# Patient Record
Sex: MALE | Race: WHITE | NOT HISPANIC OR LATINO | Employment: FULL TIME | URBAN - METROPOLITAN AREA
[De-identification: names, ages, dates, MRNs, and addresses within clinical notes are randomized per-mention and may not be internally consistent; named-entity substitution may affect disease eponyms.]

---

## 2017-01-19 ENCOUNTER — OFFICE VISIT (OUTPATIENT)
Dept: FAMILY MEDICINE CLINIC | Facility: CLINIC | Age: 42
End: 2017-01-19

## 2017-01-19 VITALS
WEIGHT: 261.8 LBS | HEIGHT: 72 IN | DIASTOLIC BLOOD PRESSURE: 80 MMHG | RESPIRATION RATE: 16 BRPM | HEART RATE: 85 BPM | OXYGEN SATURATION: 98 % | TEMPERATURE: 98.5 F | SYSTOLIC BLOOD PRESSURE: 124 MMHG | BODY MASS INDEX: 35.46 KG/M2

## 2017-01-19 DIAGNOSIS — H69.81 EUSTACHIAN TUBE DYSFUNCTION, RIGHT: Primary | ICD-10-CM

## 2017-01-19 PROBLEM — H69.80 EUSTACHIAN TUBE DYSFUNCTION: Status: ACTIVE | Noted: 2017-01-19

## 2017-01-19 PROBLEM — H69.90 EUSTACHIAN TUBE DYSFUNCTION: Status: ACTIVE | Noted: 2017-01-19

## 2017-01-19 PROCEDURE — 99213 OFFICE O/P EST LOW 20 MIN: CPT | Performed by: INTERNAL MEDICINE

## 2017-01-19 RX ORDER — GUAIFENESIN, PSEUDOEPHEDRINE HYDROCHLORIDE 600; 60 MG/1; MG/1
1 TABLET, EXTENDED RELEASE ORAL EVERY 12 HOURS
Qty: 30 TABLET | Refills: 1 | Status: SHIPPED | OUTPATIENT
Start: 2017-01-19 | End: 2018-02-19 | Stop reason: SDUPTHER

## 2017-01-19 NOTE — MR AVS SNAPSHOT
Austin REYES You   1/19/2017 3:00 PM   Office Visit    Dept Phone:  [phone]   Encounter #:  [id number]    Provider:  Angel Nicole MD   Department:  [facility] PRIMARY CARE                Your Full Care Plan              Today's Medication Changes          These changes are accurate as of: 1/19/17  9:18 AM.  If you have any questions, ask your nurse or doctor.               New Medication(s)Ordered:     pseudoephedrine-guaifenesin  MG per 12 hr tablet   Commonly known as:  MUCINEX D   Take 1 tablet by mouth Every 12 (Twelve) Hours.   Started by:  Angel Nicole MD         Stop taking medication(s)listed here:     cyclobenzaprine 10 MG tablet   Commonly known as:  FLEXERIL   Stopped by:  Angel Nicole MD           sulfamethoxazole-trimethoprim 800-160 MG per tablet   Commonly known as:  BACTRIM DS   Stopped by:  Angel Nicole MD                Where to Get Your Medications      These medications were sent to [facility] [address] [place] [address] [address] [place] [address] [phone] [facility] [phone]   [address] [place] [address]     Phone:  [phone]     pseudoephedrine-guaifenesin  MG per 12 hr tablet                  Your Updated Medication List          This list is accurate as of: 1/19/17  9:18 AM.  Always use your most recent med list.                escitalopram 10 MG tablet   Commonly known as:  LEXAPRO   Take 1 tablet by mouth daily.       levocetirizine 5 MG tablet   Commonly known as:  XYZAL   Take 1 tablet by mouth  every evening       nystatin [id number] UNIT/GM ointment   Commonly known as:  MYCOSTATIN       omeprazole 20 MG capsule   Commonly known as:  priLOSEC   Take 1 capsule by mouth daily.       pseudoephedrine-guaifenesin  MG per 12 hr tablet   Commonly known as:  MUCINEX D   Take 1 tablet by mouth Every 12 (Twelve) Hours.       WELCHOL 625 MG tablet   Generic drug:  colesevelam       zolpidem 10 MG tablet   Commonly  "known as:  AMBIEN   Take 1 tablet by mouth At Night As Needed for sleep.               Instructions     None    Patient Instructions History      Upcoming Appointments     Visit Type Date Time Department    OFFICE VISIT 1/19/2017  3:00 PM [facility] What's in My Handbag Signup     Our records indicate that you have an active [facility] HEROZ account.    You can view your After Visit Summary by going to [facility] and logging in with your HEROZ username and password.  If you don't have a HEROZ username and password but a parent or guardian has access to your record, the parent or guardian should login with their own HEROZ username and password and access your record to view the After Visit Summary.    If you have questions, you can email [email or web address] or call [phone] to talk to our HEROZ staff.  Remember, HEROZ is NOT to be used for urgent needs.  For medical emergencies, dial 911.               Other Info from Your Visit           Your Appointments     Jan 19, 2017  3:00 PM EST   Office Visit with Angel Nicole MD   [facility] (--)    [address]  [facility] [id number] [phone]           Arrive 15 minutes prior to appointment.              Allergies     Ciprofloxacin Intolerance Myalgia      Reason for Visit     Earache since monday and when the day goes on this gets worse    Dizziness           Vital Signs     Blood Pressure Pulse Temperature Respirations Height Weight    124/80 85 98.5 °F (36.9 °C) (Oral) 16 72\" (182.9 cm) 261 lb 12.8 oz (119 kg)    Oxygen Saturation Body Mass Index Smoking Status             98% 35.51 kg/m2 Never Smoker           "

## 2017-01-19 NOTE — PROGRESS NOTES
"Subjective   Patient ID: Austin Orta is a 42 y.o. male presents with   Chief Complaint   Patient presents with   • Earache     since monday and when the day goes on this gets worse   • Dizziness       HPI - this patient presents today with right earache since Monday and some mild dizziness associated with that.  He's had some congestion but no fever.  His symptoms are intermittent and mild in intensity.he has had some mild intermittent headaches associated with this as well.    Assessment plan    Eustachian tube dysfunction-start Mucinex D 60/600 daily when necessary.  If the patient's not feeling better he is to let me know and we'll get him to ENT.  Voiced understanding.    Allergies   Allergen Reactions   • Ciprofloxacin Myalgia       The following portions of the patient's history were reviewed and updated as appropriate: allergies, current medications, past family history, past medical history, past social history, past surgical history and problem list.      Review of Systems   Constitutional: Positive for fatigue. Negative for fever.   HENT: Positive for congestion and ear pain. Negative for hearing loss.    Eyes: Negative.    Respiratory: Negative.    Neurological: Positive for dizziness and headaches.       Objective     Vitals:    01/19/17 0848   BP: 124/80   Pulse: 85   Resp: 16   Temp: 98.5 °F (36.9 °C)   TempSrc: Oral   SpO2: 98%   Weight: 261 lb 12.8 oz (119 kg)   Height: 72\" (182.9 cm)         Physical Exam   Constitutional: He appears well-developed and well-nourished.   HENT:   Head: Normocephalic and atraumatic.   TM suggestive of eustachian tube dysfunction   Eyes: EOM are normal. Pupils are equal, round, and reactive to light.   Cardiovascular: Normal rate, regular rhythm and normal heart sounds.    Pulmonary/Chest: Effort normal.   Neurological: He is alert.   Psychiatric: He has a normal mood and affect. His behavior is normal.   Nursing note and vitals reviewed.        Austin was seen today " for earache and dizziness.    Diagnoses and all orders for this visit:    Eustachian tube dysfunction, right    Other orders  -     pseudoephedrine-guaifenesin (MUCINEX D)  MG per 12 hr tablet; Take 1 tablet by mouth Every 12 (Twelve) Hours.        Call or return to clinic prn if these symptoms worsen or fail to improve as anticipated.

## 2017-02-20 RX ORDER — ESCITALOPRAM OXALATE 10 MG/1
TABLET ORAL
Qty: 90 TABLET | Refills: 0 | Status: SHIPPED | OUTPATIENT
Start: 2017-02-20 | End: 2017-03-22 | Stop reason: SDUPTHER

## 2017-02-20 RX ORDER — OMEPRAZOLE 20 MG/1
CAPSULE, DELAYED RELEASE ORAL
Qty: 90 CAPSULE | Refills: 0 | Status: SHIPPED | OUTPATIENT
Start: 2017-02-20 | End: 2017-03-22 | Stop reason: SDUPTHER

## 2017-03-21 RX ORDER — LEVOCETIRIZINE DIHYDROCHLORIDE 5 MG/1
TABLET, FILM COATED ORAL
Qty: 90 TABLET | Refills: 1 | Status: SHIPPED | OUTPATIENT
Start: 2017-03-21 | End: 2017-03-22 | Stop reason: SDUPTHER

## 2017-03-22 ENCOUNTER — APPOINTMENT (OUTPATIENT)
Dept: PREADMISSION TESTING | Facility: HOSPITAL | Age: 42
End: 2017-03-22

## 2017-03-22 VITALS
HEART RATE: 83 BPM | RESPIRATION RATE: 18 BRPM | WEIGHT: 265 LBS | OXYGEN SATURATION: 96 % | TEMPERATURE: 97.5 F | BODY MASS INDEX: 35.89 KG/M2 | HEIGHT: 72 IN | DIASTOLIC BLOOD PRESSURE: 90 MMHG | SYSTOLIC BLOOD PRESSURE: 134 MMHG

## 2017-03-22 LAB
ANION GAP SERPL CALCULATED.3IONS-SCNC: 13.8 MMOL/L
BACTERIA UR QL AUTO: ABNORMAL /HPF
BILIRUB UR QL STRIP: NEGATIVE
BUN BLD-MCNC: 9 MG/DL (ref 6–20)
BUN/CREAT SERPL: 10.8 (ref 7–25)
CALCIUM SPEC-SCNC: 9.3 MG/DL (ref 8.6–10.5)
CHLORIDE SERPL-SCNC: 96 MMOL/L (ref 98–107)
CLARITY UR: CLEAR
CO2 SERPL-SCNC: 27.2 MMOL/L (ref 22–29)
COLOR UR: YELLOW
CREAT BLD-MCNC: 0.83 MG/DL (ref 0.76–1.27)
DEPRECATED RDW RBC AUTO: 40.4 FL (ref 37–54)
ERYTHROCYTE [DISTWIDTH] IN BLOOD BY AUTOMATED COUNT: 12.7 % (ref 11.5–14.5)
GFR SERPL CREATININE-BSD FRML MDRD: 101 ML/MIN/1.73
GLUCOSE BLD-MCNC: 126 MG/DL (ref 65–99)
GLUCOSE UR STRIP-MCNC: NEGATIVE MG/DL
HCT VFR BLD AUTO: 45.4 % (ref 40.4–52.2)
HGB BLD-MCNC: 15.3 G/DL (ref 13.7–17.6)
HGB UR QL STRIP.AUTO: ABNORMAL
HYALINE CASTS UR QL AUTO: ABNORMAL /LPF
KETONES UR QL STRIP: NEGATIVE
LEUKOCYTE ESTERASE UR QL STRIP.AUTO: NEGATIVE
MCH RBC QN AUTO: 29.9 PG (ref 27–32.7)
MCHC RBC AUTO-ENTMCNC: 33.7 G/DL (ref 32.6–36.4)
MCV RBC AUTO: 88.8 FL (ref 79.8–96.2)
NITRITE UR QL STRIP: NEGATIVE
PH UR STRIP.AUTO: 6.5 [PH] (ref 5–8)
PLATELET # BLD AUTO: 284 10*3/MM3 (ref 140–500)
PMV BLD AUTO: 9.2 FL (ref 6–12)
POTASSIUM BLD-SCNC: 3.9 MMOL/L (ref 3.5–5.2)
PROT UR QL STRIP: NEGATIVE
RBC # BLD AUTO: 5.11 10*6/MM3 (ref 4.6–6)
RBC # UR: ABNORMAL /HPF
REF LAB TEST METHOD: ABNORMAL
SODIUM BLD-SCNC: 137 MMOL/L (ref 136–145)
SP GR UR STRIP: 1.02 (ref 1–1.03)
SQUAMOUS #/AREA URNS HPF: ABNORMAL /HPF
UROBILINOGEN UR QL STRIP: ABNORMAL
WBC NRBC COR # BLD: 9.17 10*3/MM3 (ref 4.5–10.7)
WBC UR QL AUTO: ABNORMAL /HPF

## 2017-03-22 PROCEDURE — 81001 URINALYSIS AUTO W/SCOPE: CPT | Performed by: UROLOGY

## 2017-03-22 PROCEDURE — 93010 ELECTROCARDIOGRAM REPORT: CPT | Performed by: INTERNAL MEDICINE

## 2017-03-22 PROCEDURE — 85027 COMPLETE CBC AUTOMATED: CPT | Performed by: UROLOGY

## 2017-03-22 PROCEDURE — 80048 BASIC METABOLIC PNL TOTAL CA: CPT | Performed by: UROLOGY

## 2017-03-22 PROCEDURE — 93005 ELECTROCARDIOGRAM TRACING: CPT

## 2017-03-22 PROCEDURE — 36415 COLL VENOUS BLD VENIPUNCTURE: CPT

## 2017-03-22 RX ORDER — OMEPRAZOLE 20 MG/1
20 CAPSULE, DELAYED RELEASE ORAL EVERY MORNING
COMMUNITY
End: 2017-06-01 | Stop reason: SDUPTHER

## 2017-03-22 RX ORDER — SULFAMETHOXAZOLE AND TRIMETHOPRIM 800; 160 MG/1; MG/1
1 TABLET ORAL 2 TIMES DAILY
COMMUNITY
End: 2017-04-10

## 2017-03-22 RX ORDER — LEVOCETIRIZINE DIHYDROCHLORIDE 5 MG/1
5 TABLET, FILM COATED ORAL EVERY EVENING
COMMUNITY
End: 2017-08-26 | Stop reason: SDUPTHER

## 2017-03-22 RX ORDER — ESCITALOPRAM OXALATE 10 MG/1
10 TABLET ORAL DAILY
COMMUNITY
End: 2017-06-01 | Stop reason: SDUPTHER

## 2017-03-22 RX ORDER — HYDROCODONE BITARTRATE AND ACETAMINOPHEN 5; 325 MG/1; MG/1
1 TABLET ORAL AS NEEDED
COMMUNITY
End: 2017-04-10

## 2017-03-22 RX ORDER — ALFUZOSIN HYDROCHLORIDE 10 MG/1
10 TABLET, EXTENDED RELEASE ORAL DAILY
COMMUNITY
End: 2017-03-24 | Stop reason: HOSPADM

## 2017-03-22 NOTE — DISCHARGE INSTRUCTIONS
Take the following medications the morning of surgery with a small sip of water.  OMEPRAZOLE    Arrive to hospital on your day of surgery at 11:30 AM    General Instructions:  • Do not eat or drink after midnight: includes water, mints, or gum. You may brush your teeth.  • Do not smoke, chew tobacco, or drink alcohol.  • The Pre-Admission Testing nurse will instruct you to bring medications if unable to obtain an accurate list in Pre-Admission Testing.    • If applicable bring your C-PAP/ BI-PAP machine.  • Bring any papers given to you in the doctor’s office.  • Wear clean comfortable clothes and socks.  • Do not wear contact lenses or make-up.  Bring a case for your glasses if applicable.   • Bring crutches or walker if applicable.  • Leave all other valuables and jewelry at home.    If you were given a blood bank ID arm band remember to bring it with you the day of surgery.    Preventing a Surgical Site Infection:  Shower on the morning of surgery using a fresh bar of anti-bacterial soap (such as Dial) and clean washcloth.  Dry with a clean towel and dress in clean clothing.  For 2 to 3 days before surgery, avoid shaving with a razor near where you will have surgery because the razor can irritate skin and make it easier to develop an infection  Ask your surgeon if you will be receiving antibiotics prior to surgery  Make sure you, your family, and all healthcare providers clean their hands with soap and water or an alcohol based hand  before caring for you or your wound  If at all possible, quit smoking as many days before surgery as you can.    Day of surgery:  Upon arrival, a Pre-op nurse and Anesthesiologist will review your health history, obtain vital signs, and answer questions you may have.  The only belongings needed at this time will be your home medications and if applicable your C-PAP/BI-PAP machine.  If you are staying overnight your family can leave the rest of your belongings in the car and  bring them to your room later.  A Pre-op nurse will start an IV and you may receive medication in preparation for surgery, including something to help you relax.  Your family will be able to see you in the Pre-op area.  While you are in surgery your family should notify the waiting room  if they leave the waiting room area and provide a contact phone number.    Please be aware that surgery does come with discomfort.  We want to make every effort to control your discomfort so please discuss any uncontrolled symptoms with your nurse.   Your doctor will most likely have prescribed pain medications.      If you are going home after surgery you will receive individualized written care instructions before being discharged.  A responsible adult must drive you to and from the hospital on the day of your surgery and stay with you for 24 hours.    If you are staying overnight following surgery, you will be transported to your hospital room following the recovery period.  [facility] has all private rooms.    If you have any questions please call Pre-Admission Testing at [phone].  Deductibles and co-payments are collected on the day of service. Please be prepared to pay the required co-pay, deductible or deposit on the day of service as defined by your plan.

## 2017-03-24 ENCOUNTER — APPOINTMENT (OUTPATIENT)
Dept: GENERAL RADIOLOGY | Facility: HOSPITAL | Age: 42
End: 2017-03-24

## 2017-03-24 ENCOUNTER — ANESTHESIA EVENT (OUTPATIENT)
Dept: PERIOP | Facility: HOSPITAL | Age: 42
End: 2017-03-24

## 2017-03-24 ENCOUNTER — ANESTHESIA (OUTPATIENT)
Dept: PERIOP | Facility: HOSPITAL | Age: 42
End: 2017-03-24

## 2017-03-24 ENCOUNTER — HOSPITAL ENCOUNTER (OUTPATIENT)
Facility: HOSPITAL | Age: 42
Setting detail: HOSPITAL OUTPATIENT SURGERY
Discharge: HOME | End: 2017-03-24
Attending: UROLOGY | Admitting: UROLOGY

## 2017-03-24 VITALS
SYSTOLIC BLOOD PRESSURE: 135 MMHG | TEMPERATURE: 98.2 F | DIASTOLIC BLOOD PRESSURE: 84 MMHG | OXYGEN SATURATION: 95 % | HEART RATE: 87 BPM | RESPIRATION RATE: 16 BRPM

## 2017-03-24 PROCEDURE — 25010000002 MIDAZOLAM PER 1 MG

## 2017-03-24 PROCEDURE — 74000 HC ABDOMEN KUB: CPT

## 2017-03-24 PROCEDURE — 25010000002 DEXAMETHASONE PER 1 MG: Performed by: NURSE ANESTHETIST, CERTIFIED REGISTERED

## 2017-03-24 PROCEDURE — 25010000002 FENTANYL CITRATE (PF) 100 MCG/2ML SOLUTION: Performed by: NURSE ANESTHETIST, CERTIFIED REGISTERED

## 2017-03-24 PROCEDURE — 25010000002 ONDANSETRON PER 1 MG: Performed by: NURSE ANESTHETIST, CERTIFIED REGISTERED

## 2017-03-24 PROCEDURE — 25010000002 PROPOFOL 10 MG/ML EMULSION: Performed by: NURSE ANESTHETIST, CERTIFIED REGISTERED

## 2017-03-24 PROCEDURE — 25010000002 KETOROLAC TROMETHAMINE PER 15 MG: Performed by: NURSE ANESTHETIST, CERTIFIED REGISTERED

## 2017-03-24 PROCEDURE — 25010000003 CEFAZOLIN PER 500 MG: Performed by: UROLOGY

## 2017-03-24 PROCEDURE — 25010000002 MIDAZOLAM PER 1 MG: Performed by: ANESTHESIOLOGY

## 2017-03-24 RX ORDER — FLUMAZENIL 0.1 MG/ML
0.2 INJECTION INTRAVENOUS AS NEEDED
Status: DISCONTINUED | OUTPATIENT
Start: 2017-03-24 | End: 2017-03-24 | Stop reason: HOSPADM

## 2017-03-24 RX ORDER — PROPOFOL 10 MG/ML
VIAL (ML) INTRAVENOUS AS NEEDED
Status: DISCONTINUED | OUTPATIENT
Start: 2017-03-24 | End: 2017-03-24 | Stop reason: SURG

## 2017-03-24 RX ORDER — OXYCODONE HYDROCHLORIDE AND ACETAMINOPHEN 5; 325 MG/1; MG/1
1 TABLET ORAL ONCE AS NEEDED
Status: DISCONTINUED | OUTPATIENT
Start: 2017-03-24 | End: 2017-03-24 | Stop reason: HOSPADM

## 2017-03-24 RX ORDER — PROMETHAZINE HYDROCHLORIDE 25 MG/1
25 TABLET ORAL ONCE AS NEEDED
Status: DISCONTINUED | OUTPATIENT
Start: 2017-03-24 | End: 2017-03-24 | Stop reason: HOSPADM

## 2017-03-24 RX ORDER — SODIUM CHLORIDE 0.9 % (FLUSH) 0.9 %
1-10 SYRINGE (ML) INJECTION AS NEEDED
Status: DISCONTINUED | OUTPATIENT
Start: 2017-03-24 | End: 2017-03-24 | Stop reason: HOSPADM

## 2017-03-24 RX ORDER — SODIUM CHLORIDE, SODIUM LACTATE, POTASSIUM CHLORIDE, CALCIUM CHLORIDE 600; 310; 30; 20 MG/100ML; MG/100ML; MG/100ML; MG/100ML
9 INJECTION, SOLUTION INTRAVENOUS CONTINUOUS
Status: DISCONTINUED | OUTPATIENT
Start: 2017-03-24 | End: 2017-03-24 | Stop reason: HOSPADM

## 2017-03-24 RX ORDER — PROMETHAZINE HYDROCHLORIDE 25 MG/ML
6.25 INJECTION, SOLUTION INTRAMUSCULAR; INTRAVENOUS ONCE AS NEEDED
Status: DISCONTINUED | OUTPATIENT
Start: 2017-03-24 | End: 2017-03-24 | Stop reason: HOSPADM

## 2017-03-24 RX ORDER — MIDAZOLAM HYDROCHLORIDE 1 MG/ML
INJECTION INTRAMUSCULAR; INTRAVENOUS
Status: COMPLETED
Start: 2017-03-24 | End: 2017-03-24

## 2017-03-24 RX ORDER — KETOROLAC TROMETHAMINE 30 MG/ML
INJECTION, SOLUTION INTRAMUSCULAR; INTRAVENOUS AS NEEDED
Status: DISCONTINUED | OUTPATIENT
Start: 2017-03-24 | End: 2017-03-24 | Stop reason: SURG

## 2017-03-24 RX ORDER — HYDROMORPHONE HYDROCHLORIDE 1 MG/ML
0.5 INJECTION, SOLUTION INTRAMUSCULAR; INTRAVENOUS; SUBCUTANEOUS
Status: DISCONTINUED | OUTPATIENT
Start: 2017-03-24 | End: 2017-03-24 | Stop reason: HOSPADM

## 2017-03-24 RX ORDER — NALOXONE HCL 0.4 MG/ML
0.4 VIAL (ML) INJECTION AS NEEDED
Status: DISCONTINUED | OUTPATIENT
Start: 2017-03-24 | End: 2017-03-24 | Stop reason: HOSPADM

## 2017-03-24 RX ORDER — FAMOTIDINE 10 MG/ML
20 INJECTION, SOLUTION INTRAVENOUS ONCE
Status: COMPLETED | OUTPATIENT
Start: 2017-03-24 | End: 2017-03-24

## 2017-03-24 RX ORDER — MAGNESIUM HYDROXIDE 1200 MG/15ML
LIQUID ORAL AS NEEDED
Status: DISCONTINUED | OUTPATIENT
Start: 2017-03-24 | End: 2017-03-24 | Stop reason: HOSPADM

## 2017-03-24 RX ORDER — ONDANSETRON 2 MG/ML
4 INJECTION INTRAMUSCULAR; INTRAVENOUS ONCE AS NEEDED
Status: DISCONTINUED | OUTPATIENT
Start: 2017-03-24 | End: 2017-03-24 | Stop reason: HOSPADM

## 2017-03-24 RX ORDER — MIDAZOLAM HYDROCHLORIDE 1 MG/ML
2 INJECTION INTRAMUSCULAR; INTRAVENOUS
Status: DISCONTINUED | OUTPATIENT
Start: 2017-03-24 | End: 2017-03-24 | Stop reason: HOSPADM

## 2017-03-24 RX ORDER — LIDOCAINE HYDROCHLORIDE 20 MG/ML
INJECTION, SOLUTION INFILTRATION; PERINEURAL AS NEEDED
Status: DISCONTINUED | OUTPATIENT
Start: 2017-03-24 | End: 2017-03-24 | Stop reason: SURG

## 2017-03-24 RX ORDER — ONDANSETRON 2 MG/ML
INJECTION INTRAMUSCULAR; INTRAVENOUS AS NEEDED
Status: DISCONTINUED | OUTPATIENT
Start: 2017-03-24 | End: 2017-03-24 | Stop reason: SURG

## 2017-03-24 RX ORDER — HYDROMORPHONE HYDROCHLORIDE 1 MG/ML
0.25 INJECTION, SOLUTION INTRAMUSCULAR; INTRAVENOUS; SUBCUTANEOUS
Status: DISCONTINUED | OUTPATIENT
Start: 2017-03-24 | End: 2017-03-24 | Stop reason: HOSPADM

## 2017-03-24 RX ORDER — MIDAZOLAM HYDROCHLORIDE 1 MG/ML
1 INJECTION INTRAMUSCULAR; INTRAVENOUS
Status: DISCONTINUED | OUTPATIENT
Start: 2017-03-24 | End: 2017-03-24 | Stop reason: HOSPADM

## 2017-03-24 RX ORDER — FAMOTIDINE 10 MG/ML
INJECTION, SOLUTION INTRAVENOUS
Status: COMPLETED
Start: 2017-03-24 | End: 2017-03-24

## 2017-03-24 RX ORDER — FENTANYL CITRATE 50 UG/ML
25 INJECTION, SOLUTION INTRAMUSCULAR; INTRAVENOUS
Status: DISCONTINUED | OUTPATIENT
Start: 2017-03-24 | End: 2017-03-24 | Stop reason: HOSPADM

## 2017-03-24 RX ORDER — PROMETHAZINE HYDROCHLORIDE 25 MG/1
25 SUPPOSITORY RECTAL ONCE AS NEEDED
Status: DISCONTINUED | OUTPATIENT
Start: 2017-03-24 | End: 2017-03-24 | Stop reason: HOSPADM

## 2017-03-24 RX ORDER — FENTANYL CITRATE 50 UG/ML
INJECTION, SOLUTION INTRAMUSCULAR; INTRAVENOUS AS NEEDED
Status: DISCONTINUED | OUTPATIENT
Start: 2017-03-24 | End: 2017-03-24 | Stop reason: SURG

## 2017-03-24 RX ORDER — HYDRALAZINE HYDROCHLORIDE 20 MG/ML
5 INJECTION INTRAMUSCULAR; INTRAVENOUS
Status: DISCONTINUED | OUTPATIENT
Start: 2017-03-24 | End: 2017-03-24 | Stop reason: HOSPADM

## 2017-03-24 RX ORDER — DIPHENHYDRAMINE HYDROCHLORIDE 50 MG/ML
6.25 INJECTION INTRAMUSCULAR; INTRAVENOUS
Status: DISCONTINUED | OUTPATIENT
Start: 2017-03-24 | End: 2017-03-24 | Stop reason: HOSPADM

## 2017-03-24 RX ORDER — LABETALOL HYDROCHLORIDE 5 MG/ML
5 INJECTION, SOLUTION INTRAVENOUS
Status: DISCONTINUED | OUTPATIENT
Start: 2017-03-24 | End: 2017-03-24 | Stop reason: HOSPADM

## 2017-03-24 RX ORDER — DEXAMETHASONE SODIUM PHOSPHATE 10 MG/ML
INJECTION INTRAMUSCULAR; INTRAVENOUS AS NEEDED
Status: DISCONTINUED | OUTPATIENT
Start: 2017-03-24 | End: 2017-03-24 | Stop reason: SURG

## 2017-03-24 RX ORDER — PROMETHAZINE HYDROCHLORIDE 25 MG/ML
12.5 INJECTION, SOLUTION INTRAMUSCULAR; INTRAVENOUS ONCE AS NEEDED
Status: DISCONTINUED | OUTPATIENT
Start: 2017-03-24 | End: 2017-03-24 | Stop reason: HOSPADM

## 2017-03-24 RX ORDER — HYDROCODONE BITARTRATE AND ACETAMINOPHEN 7.5; 325 MG/1; MG/1
1 TABLET ORAL EVERY 6 HOURS PRN
Qty: 30 TABLET | Refills: 0 | Status: SHIPPED | OUTPATIENT
Start: 2017-03-24 | End: 2017-04-10

## 2017-03-24 RX ORDER — PROMETHAZINE HYDROCHLORIDE 25 MG/1
12.5 TABLET ORAL ONCE AS NEEDED
Status: DISCONTINUED | OUTPATIENT
Start: 2017-03-24 | End: 2017-03-24 | Stop reason: HOSPADM

## 2017-03-24 RX ADMIN — SODIUM CHLORIDE, POTASSIUM CHLORIDE, SODIUM LACTATE AND CALCIUM CHLORIDE 9 ML/HR: 600; 310; 30; 20 INJECTION, SOLUTION INTRAVENOUS at 13:01

## 2017-03-24 RX ADMIN — FAMOTIDINE 20 MG: 10 INJECTION, SOLUTION INTRAVENOUS at 13:02

## 2017-03-24 RX ADMIN — PROPOFOL 200 MG: 10 INJECTION, EMULSION INTRAVENOUS at 14:02

## 2017-03-24 RX ADMIN — SODIUM CHLORIDE, POTASSIUM CHLORIDE, SODIUM LACTATE AND CALCIUM CHLORIDE: 600; 310; 30; 20 INJECTION, SOLUTION INTRAVENOUS at 14:30

## 2017-03-24 RX ADMIN — MIDAZOLAM 2 MG: 1 INJECTION INTRAMUSCULAR; INTRAVENOUS at 13:02

## 2017-03-24 RX ADMIN — FENTANYL CITRATE 50 MCG: 50 INJECTION INTRAMUSCULAR; INTRAVENOUS at 14:06

## 2017-03-24 RX ADMIN — KETOROLAC TROMETHAMINE 30 MG: 30 INJECTION, SOLUTION INTRAMUSCULAR; INTRAVENOUS at 14:25

## 2017-03-24 RX ADMIN — FENTANYL CITRATE 50 MCG: 50 INJECTION INTRAMUSCULAR; INTRAVENOUS at 14:09

## 2017-03-24 RX ADMIN — ONDANSETRON 4 MG: 2 INJECTION INTRAMUSCULAR; INTRAVENOUS at 14:25

## 2017-03-24 RX ADMIN — MIDAZOLAM 2 MG: 1 INJECTION INTRAMUSCULAR; INTRAVENOUS at 13:43

## 2017-03-24 RX ADMIN — LIDOCAINE HYDROCHLORIDE 100 MG: 20 INJECTION, SOLUTION INFILTRATION; PERINEURAL at 14:02

## 2017-03-24 RX ADMIN — DEXAMETHASONE SODIUM PHOSPHATE 8 MG: 10 INJECTION INTRAMUSCULAR; INTRAVENOUS at 14:25

## 2017-03-24 RX ADMIN — CEFAZOLIN SODIUM 1 G: 1 INJECTION, SOLUTION INTRAVENOUS at 13:59

## 2017-03-24 RX ADMIN — OXYCODONE HYDROCHLORIDE AND ACETAMINOPHEN 1 TABLET: 5; 325 TABLET ORAL at 14:55

## 2017-03-24 NOTE — ANESTHESIA POSTPROCEDURE EVALUATION
Patient: Austin Orta    Procedure Summary     Date Anesthesia Start Anesthesia Stop Room / Location    03/24/17 [id number]  [facility]  /  [facility]       Procedure Diagnosis Surgeon Provider    RIGHT EXTRACORPOREAL SHOCKWAVE LITHOTRIPSY WITH CYSTOSCOPY (Right ) No diagnosis on file. MD Andrea Veliz MD          Anesthesia Type: general  Last vitals  BP      Temp      Pulse     Resp      SpO2        Post Anesthesia Care and Evaluation      Comments: Pt discharged without signout

## 2017-03-24 NOTE — OP NOTE
Operative Report    [facility]    Patient: Austin Orta  Age:      42 y.o.  :     1975  Sex:      male    Medical Record:  [id number]    Date of Operation/Procedure:  3/24/2017    Pre-operative Diagnosis Code: * No Diagnosis Codes entered *    Pre-operative Diagnosis Free Text:R renal stone, hematuria.    Post-operative Diagnosis: same.    Surgeon(s) and Role:     * Maksim Walker MD - Primary     Name of Operation/Procedure:  Procedure(s) and Anesthesia Type:     * RIGHT EXTRACORPOREAL SHOCKWAVE LITHOTRIPSY WITH CYSTOSCOPY - General    Findings/Complications:  None.    Description of procedure: R ESWL cysto.    Specimens: none.    Estimated Blood Loss:none.    Fluids/Drains:None.    Maksim Walker MD  3/24/2017  2:42 PM

## 2017-03-24 NOTE — PLAN OF CARE
Problem: Patient Care Overview (Adult)  Goal: Plan of Care Review  Outcome: Ongoing (interventions implemented as appropriate)    03/24/17 1458   Coping/Psychosocial Response Interventions   Plan Of Care Reviewed With patient   Patient Care Overview   Progress improving   Outcome Evaluation   Outcome Summary/Follow up Plan no pain       Goal: Adult Individualization and Mutuality  Outcome: Ongoing (interventions implemented as appropriate)  Goal: Discharge Needs Assessment  Outcome: Ongoing (interventions implemented as appropriate)

## 2017-03-24 NOTE — PLAN OF CARE
Problem: Perioperative Period (Adult)  Goal: Signs and Symptoms of Listed Potential Problems Will be Absent or Manageable (Perioperative Period)  Outcome: Ongoing (interventions implemented as appropriate)    03/24/17 1457   Perioperative Period   Problems Assessed (Perioperative Period) pain;hemorrhage;hypothermia;hypoxia/hypoxemia;perioperative injury   Problems Present (Perioperative Period) none

## 2017-03-24 NOTE — H&P
FIRST UROLOGY CONSULT      Patient Identification:  NAME:  Austin Orta  Age:  42 y.o.   Sex:  male   :  1975   MRN:  [id number]       Chief complaint: R renal stone. Hematuria.    History of present illness:  H/o 9mm R renal stone. Hematuria.  For R ESWL cysto.        Past medical history:  Past Medical History:   Diagnosis Date   • Anxiety and depression    • GERD (gastroesophageal reflux disease)    • History of prostatitis    • Insomnia    • Joint pain    • Right kidney stone        Past surgical history:  Past Surgical History:   Procedure Laterality Date   • CHOLECYSTECTOMY     • INGUINAL HERNIA REPAIR     • KIDNEY STONE SURGERY     • TONSILLECTOMY     • VASECTOMY         Allergies:  Ciprofloxacin    Home medications:  Prescriptions Prior to Admission   Medication Sig Dispense Refill Last Dose   • escitalopram (LEXAPRO) 10 MG tablet Take 10 mg by mouth Daily.   3/23/2017 at Unknown time   • omeprazole (priLOSEC) 20 MG capsule Take 20 mg by mouth Every Morning.   3/24/2017 at Unknown time   • pseudoephedrine-guaifenesin (MUCINEX D)  MG per 12 hr tablet Take 1 tablet by mouth Every 12 (Twelve) Hours. (Patient taking differently: Take 1 tablet by mouth 2 (Two) Times a Day As Needed.) 30 tablet 1 3/23/2017 at Unknown time   • zolpidem (AMBIEN) 10 MG tablet Take 1 tablet by mouth At Night As Needed for sleep. (Patient taking differently: Take 2.5 mg by mouth At Night As Needed for Sleep.) 90 tablet 0 3/23/2017 at Unknown time   • alfuzosin (UROXATRAL) 10 MG 24 hr tablet Take 10 mg by mouth Daily.   3/22/2017   • HYDROcodone-acetaminophen (NORCO) 5-325 MG per tablet Take 1 tablet by mouth As Needed.   3/17/2017   • levocetirizine (XYZAL) 5 MG tablet Take 5 mg by mouth Every Evening.   3/22/2017   • sulfamethoxazole-trimethoprim (BACTRIM DS,SEPTRA DS) 800-160 MG per tablet Take 1 tablet by mouth 2 (Two) Times a Day. LAST DOSE PM 3/22/17   3/22/2017        Hospital medications:    ceFAZolin 1 g  Intravenous Once       lactated ringers 9 mL/hr Last Rate: 9 mL/hr (17 1301)     midazolam **OR** midazolam  •  sodium chloride    Family history:  Family History   Problem Relation Age of Onset   • Cancer Mother        Social history:  Social History   Substance Use Topics   • Smoking status: Never Smoker   • Smokeless tobacco: Never Used   • Alcohol use No       Review of systems:    Negative 12-system ROS except for the following:       Objective:  TMax 24 hours:   Temp (24hrs), Av.1 °F (36.7 °C), Min:98.1 °F (36.7 °C), Max:98.1 °F (36.7 °C)      Vitals Ranges:   Temp:  [98.1 °F (36.7 °C)] 98.1 °F (36.7 °C)  Heart Rate:  [78] 78  Resp:  [16] 16  BP: (117)/(84) 117/84    Intake/Output Last 3 shifts:        Physical Exam:       General Appearance:    Alert, cooperative, in no acute distress   Head:    Normocephalic, without obvious abnormality, atraumatic   Eyes:          PERRL, conjunctivae and corneas clear   Ears:    Normal external inspection   Throat:   No oral lesions.   Neck:   Supple.   Back:     No CVA tenderness   Lungs:     Respirations unlabored, symmetric excursion    Heart:    RRR.   Abdomen:     Soft, NDNT, no masses, no guarding   :      Extremities:   No edema, no deformity   Skin:   No bleeding, bruising or rashes   Neuro/Psych:   Orientation intact, mood/affect pleasant, no focal findings       Results review:   I reviewed the patient's new clinical results.    Data review:  Lab Results (last 24 hours)     ** No results found for the last 24 hours. **           Imaging:  Imaging Results (last 24 hours)     Procedure Component Value Units Date/Time    XR Abdomen KUB [[id number]] Updated:  17 1204             Assessment:     Active Problems:    * No active hospital problems. *    9mm R renal stone.  Hematuria.      Plan:     R ESWL. Cysto.      Maksim Walker MD  17  1:10 PM

## 2017-03-24 NOTE — OP NOTE
Date of Procedure:  03/24/2017    PREOPERATIVE DIAGNOSES:   1. Right renal stone.   2. Hematuria.     POSTOPERATIVE DIAGNOSES:   1. Right renal stone.   2. Hematuria.     PROCEDURES PERFORMED:   1. Right extracorporeal shockwave lithotripsy.   2. Flexible cystoscopy.     SURGEON: Maksim Walker MD     ANESTHESIA: General.     ESTIMATED BLOOD LOSS: None.     DRAIN: None.     SPECIMEN: None.     COMPLICATION: None.     INDICATIONS FOR PROCEDURE: The patient is a very pleasant 42-year-old male who is a patient of my partner, Dr. Jason Perez. He has a history of hematuria. CT scan revealed an 8 mm right renal stone. He presents today for right ESWL and cystoscopy. The patient stated he did not want a stent.     INFORMED CONSENT:  The risks and benefits were explained to include, but not limited to, bleeding, infection, possible damage to kidney and ureter, and the possibility of multiple procedures. The patient consented to the procedure.     DESCRIPTION OF PROCEDURE: After receiving IV antibiotics he was taken to the operative suite and placed in the supine position on the lithotripsy table. He underwent a general anesthesia. After adequate anesthesia was obtained his groin was prepped and draped in sterile fashion. I placed a flexible cystoscope into the urethra and into the bladder. The urethra and prostate were within normal limits. Once into the bladder the ureteral orifices were noted bilaterally. No masses or lesions were noted. The base, trigone, lateral wall, dome, posterior wall, and bladder neck were inspected in systematic fashion. No lesions were noted. The cystoscope was removed. I then placed the stone between the crosshairs in the right kidney and crosshairs both AP and laterally. The stone received a total of 3000 shocks at a maximum power of 24 kV and appeared to fragment partially. He was extubated and taken to recovery in satisfactory condition. He tolerated the procedure well.       Maksim  ROSY Mathews:ab  D:   03/24/2017 14:47:53  T:   03/24/2017 19:47:33  Job ID:   [id number]  Document ID:   [id number]  cc:

## 2017-03-24 NOTE — ANESTHESIA PROCEDURE NOTES
Airway  Airway not difficult    General Information and Staff    Patient location during procedure: OR  Anesthesiologist: RENDER, NICK RAY  CRNA: MIRA DAVIS    Indications and Patient Condition  Indications for airway management: airway protection    Preoxygenated: yes  Mask difficulty assessment: 0 - not attempted    Final Airway Details  Final airway type: supraglottic airway      Successful airway: classic  Size 5    Number of attempts at approach: 1    Additional Comments  LMA inserted without difficulty.  Lips and teeth intact as preop condition.  No signs or symptoms of gastric regurgitation.  Seal with minimal pressure and secure.

## 2017-03-24 NOTE — ANESTHESIA PREPROCEDURE EVALUATION
Anesthesia Evaluation     no history of anesthetic complications:     Airway   Mallampati: II  no difficulty expected  Dental - normal exam     Pulmonary - negative pulmonary ROS and normal exam   (-) COPD, asthma, sleep apnea, not a smoker    PE comment: nonlabored  Cardiovascular - negative cardio ROS and normal exam    Rhythm: regular  Rate: normal    (-) hypertension, valvular problems/murmurs, past MI, CAD, dysrhythmias, angina      Neuro/Psych  (+) psychiatric history Anxiety and Depression,    (-) seizures, TIA, CVA  GI/Hepatic/Renal/Endo    (+) obesity,  GERD well controlled, chronic renal disease stones,   (-) liver disease, diabetes, hypothyroidism, hyperthyroidism    Musculoskeletal (-) negative ROS    Abdominal    Substance History      OB/GYN          Other          Other Comment: HLD                              Anesthesia Plan    ASA 2     general     intravenous induction   Anesthetic plan and risks discussed with patient.

## 2017-04-10 ENCOUNTER — HOSPITAL ENCOUNTER (OUTPATIENT)
Dept: GENERAL RADIOLOGY | Facility: HOSPITAL | Age: 42
Discharge: HOME | End: 2017-04-10
Admitting: INTERNAL MEDICINE

## 2017-04-10 ENCOUNTER — OFFICE VISIT (OUTPATIENT)
Dept: FAMILY MEDICINE CLINIC | Facility: CLINIC | Age: 42
End: 2017-04-10

## 2017-04-10 VITALS
OXYGEN SATURATION: 92 % | BODY MASS INDEX: 35.74 KG/M2 | TEMPERATURE: 98.8 F | DIASTOLIC BLOOD PRESSURE: 80 MMHG | SYSTOLIC BLOOD PRESSURE: 110 MMHG | WEIGHT: 263.9 LBS | HEART RATE: 84 BPM | RESPIRATION RATE: 16 BRPM | HEIGHT: 72 IN

## 2017-04-10 DIAGNOSIS — M12.812 ROTATOR CUFF ARTHROPATHY, LEFT: Primary | ICD-10-CM

## 2017-04-10 DIAGNOSIS — M25.512 LEFT SHOULDER PAIN, UNSPECIFIED CHRONICITY: Primary | ICD-10-CM

## 2017-04-10 PROBLEM — M12.819 ROTATOR CUFF ARTHROPATHY: Status: ACTIVE | Noted: 2017-04-10

## 2017-04-10 PROCEDURE — 73030 X-RAY EXAM OF SHOULDER: CPT

## 2017-04-10 PROCEDURE — 20610 DRAIN/INJ JOINT/BURSA W/O US: CPT | Performed by: INTERNAL MEDICINE

## 2017-04-10 PROCEDURE — 99212 OFFICE O/P EST SF 10 MIN: CPT | Performed by: INTERNAL MEDICINE

## 2017-04-10 RX ORDER — TAMSULOSIN HYDROCHLORIDE 0.4 MG/1
1 CAPSULE ORAL NIGHTLY
COMMUNITY
End: 2019-01-07

## 2017-04-10 RX ORDER — HYDROCODONE BITARTRATE AND ACETAMINOPHEN 7.5; 325 MG/1; MG/1
1 TABLET ORAL EVERY 6 HOURS PRN
Qty: 30 TABLET | Refills: 0 | Status: SHIPPED | OUTPATIENT
Start: 2017-04-10 | End: 2018-07-26

## 2017-04-10 RX ORDER — METHYLPREDNISOLONE ACETATE 40 MG/ML
40 INJECTION, SUSPENSION INTRA-ARTICULAR; INTRALESIONAL; INTRAMUSCULAR; SOFT TISSUE ONCE
Status: COMPLETED | OUTPATIENT
Start: 2017-04-10 | End: 2017-04-10

## 2017-04-10 RX ADMIN — METHYLPREDNISOLONE ACETATE 40 MG: 40 INJECTION, SUSPENSION INTRA-ARTICULAR; INTRALESIONAL; INTRAMUSCULAR; SOFT TISSUE at 10:44

## 2017-04-10 NOTE — PROGRESS NOTES
Procedure   Arthrocentesis  Date/Time: 4/10/2017 10:18 AM  Performed by: GISELA ROSENTHAL  Authorized by: GISELA ROSENTHAL   Consent: Verbal consent obtained.  Consent given by: patient  Indications: pain   Body area: shoulder  Joint: left shoulder  Local anesthesia used: no    Anesthesia:  Local anesthesia used: no  Needle size: 22 G  Ultrasound guidance: no  Approach: posterior  Methylprednisolone amount: 40 mg  Lidocaine 1% amount: 0.5 mL  Patient tolerance: Patient tolerated the procedure well with no immediate complications

## 2017-04-10 NOTE — PROGRESS NOTES
"Subjective   Patient ID: Austin Orta is a 42 y.o. male presents with   Chief Complaint   Patient presents with   • Shoulder Pain     left side- was walking out of the bats game this past thursday and the wind blew his hat off and he reached backwards to grab his hat and hurt his shoulder. Has had a uncomfortable pain for about ten years prior       HPI - this patient was leaving the ball game on Thursday and the wind blew has had off he quickly reached up with his left hand to grab a hat and his shoulder had a sudden pain in it.  He has a history of rotator cuff tendinitis.pain is pretty intense at this point worse with range of motion.  He's tried some Aleve but it's not working.  He wants to see an orthopedist because he's had a lot of trouble.    Assessment plan    Left rotator cuff tendinitis likely will get an x-ray hydrocodone 7. 5 at night to help him sleep.  This medicine helps with his quality of life he has no adverse effects he is adherent to the regimen.  He may take Aleve as well we'll get an x-ray sending orthopedics and I did a therapeutic injection see procedure note.    Allergies   Allergen Reactions   • Ciprofloxacin Nausea And Vomiting and Myalgia       The following portions of the patient's history were reviewed and updated as appropriate: allergies, current medications, past family history, past medical history, past social history, past surgical history and problem list.      Review of Systems   Constitutional: Negative.    Musculoskeletal: Positive for arthralgias.        Joint pain   Neurological: Negative.        Objective     Vitals:    04/10/17 0942   BP: 110/80   Pulse: 84   Resp: 16   Temp: 98.8 °F (37.1 °C)   TempSrc: Oral   SpO2: 92%   Weight: 263 lb 14.4 oz (120 kg)   Height: 72\" (182.9 cm)         Physical Exam   Constitutional: He appears well-developed and well-nourished.   Musculoskeletal: He exhibits tenderness. He exhibits no edema or deformity.   Psychiatric: He has a " normal mood and affect. His behavior is normal.   Vitals reviewed.        Austin was seen today for shoulder pain.    Diagnoses and all orders for this visit:    Rotator cuff arthropathy, left  -     Ambulatory Referral to Orthopedic Surgery  -     XR Shoulder 2+ View Left    Other orders  -     HYDROcodone-acetaminophen (NORCO) 7.5-325 MG per tablet; Take 1 tablet by mouth Every 6 (Six) Hours As Needed for Moderate Pain (4-6).        Call or return to clinic prn if these symptoms worsen or fail to improve as anticipated.

## 2017-04-28 ENCOUNTER — OFFICE VISIT (OUTPATIENT)
Dept: ORTHOPEDIC SURGERY | Facility: CLINIC | Age: 42
End: 2017-04-28

## 2017-04-28 VITALS — HEIGHT: 72 IN | WEIGHT: 256.4 LBS | TEMPERATURE: 97.1 F | BODY MASS INDEX: 34.73 KG/M2

## 2017-04-28 DIAGNOSIS — M25.512 CHRONIC LEFT SHOULDER PAIN: Primary | ICD-10-CM

## 2017-04-28 DIAGNOSIS — G89.29 CHRONIC LEFT SHOULDER PAIN: Primary | ICD-10-CM

## 2017-04-28 PROCEDURE — 99204 OFFICE O/P NEW MOD 45 MIN: CPT | Performed by: ORTHOPAEDIC SURGERY

## 2017-05-09 ENCOUNTER — HOSPITAL ENCOUNTER (OUTPATIENT)
Dept: GENERAL RADIOLOGY | Facility: HOSPITAL | Age: 42
Discharge: HOME | End: 2017-05-09
Attending: ORTHOPAEDIC SURGERY | Admitting: ORTHOPAEDIC SURGERY

## 2017-05-09 ENCOUNTER — HOSPITAL ENCOUNTER (OUTPATIENT)
Dept: MRI IMAGING | Facility: HOSPITAL | Age: 42
Discharge: HOME | End: 2017-05-09
Attending: ORTHOPAEDIC SURGERY

## 2017-05-09 DIAGNOSIS — M25.512 CHRONIC LEFT SHOULDER PAIN: ICD-10-CM

## 2017-05-09 DIAGNOSIS — G89.29 CHRONIC LEFT SHOULDER PAIN: ICD-10-CM

## 2017-05-09 PROCEDURE — 77002 NEEDLE LOCALIZATION BY XRAY: CPT

## 2017-05-09 PROCEDURE — 0 GADOBENATE DIMEGLUMINE 529 MG/ML SOLUTION: Performed by: RADIOLOGY

## 2017-05-09 PROCEDURE — 73222 MRI JOINT UPR EXTREM W/DYE: CPT

## 2017-05-09 PROCEDURE — 0 IOPAMIDOL 61 % SOLUTION: Performed by: RADIOLOGY

## 2017-05-09 PROCEDURE — A9577 INJ MULTIHANCE: HCPCS | Performed by: RADIOLOGY

## 2017-05-09 RX ORDER — LIDOCAINE HYDROCHLORIDE 10 MG/ML
10 INJECTION, SOLUTION INFILTRATION; PERINEURAL ONCE
Status: COMPLETED | OUTPATIENT
Start: 2017-05-09 | End: 2017-05-09

## 2017-05-09 RX ADMIN — LIDOCAINE HYDROCHLORIDE 4 ML: 10 INJECTION, SOLUTION INFILTRATION; PERINEURAL at 13:57

## 2017-05-09 RX ADMIN — IOPAMIDOL 5 ML: 612 INJECTION, SOLUTION INTRAVENOUS at 13:57

## 2017-05-09 RX ADMIN — GADOBENATE DIMEGLUMINE 0.05 ML: 529 INJECTION, SOLUTION INTRAVENOUS at 13:57

## 2017-05-26 ENCOUNTER — TELEPHONE (OUTPATIENT)
Dept: ORTHOPEDIC SURGERY | Facility: CLINIC | Age: 42
End: 2017-05-26

## 2017-05-26 DIAGNOSIS — M75.120 COMPLETE TEAR OF ROTATOR CUFF, UNSPECIFIED LATERALITY: Primary | ICD-10-CM

## 2017-06-01 RX ORDER — OMEPRAZOLE 20 MG/1
CAPSULE, DELAYED RELEASE ORAL
Qty: 90 CAPSULE | Refills: 2 | Status: SHIPPED | OUTPATIENT
Start: 2017-06-01 | End: 2018-05-13 | Stop reason: SDUPTHER

## 2017-06-01 RX ORDER — ESCITALOPRAM OXALATE 10 MG/1
TABLET ORAL
Qty: 90 TABLET | Refills: 2 | Status: SHIPPED | OUTPATIENT
Start: 2017-06-01 | End: 2018-05-25 | Stop reason: SDUPTHER

## 2017-06-05 ENCOUNTER — TREATMENT (OUTPATIENT)
Dept: PHYSICAL THERAPY | Facility: CLINIC | Age: 42
End: 2017-06-05

## 2017-06-05 DIAGNOSIS — M75.102 TEAR OF LEFT ROTATOR CUFF, UNSPECIFIED TEAR EXTENT: Primary | ICD-10-CM

## 2017-06-05 PROCEDURE — 97161 PT EVAL LOW COMPLEX 20 MIN: CPT | Performed by: PHYSICAL THERAPIST

## 2017-06-05 PROCEDURE — 97110 THERAPEUTIC EXERCISES: CPT | Performed by: PHYSICAL THERAPIST

## 2017-06-05 NOTE — PROGRESS NOTES
Physical Therapy Initial Evaluation and Plan of Care    TIME  TIME     Subjective Evaluation    History of Present Illness  Date of onset: 2017  Mechanism of injury: Patient reached up to grab his hat, that the wind blew off.  Shoulder has been bothering him for years.    Quality of life: good    Pain  Current pain ratin  At worst pain ratin  Location: left anterior shoulder  Quality: sharp  Aggravating factors: movement  Progression: worsening    Hand dominance: right    Diagnostic Tests  MRI studies: abnormal    Treatments  Previous treatment: injection treatment  Patient Goals  Patient goal: no pain           Objective     Observations     Additional Observation Details  Min protracted shoulders.    Palpation     Additional Palpation Details  TTP to left LHB and SS tendons.    Active Range of Motion   Left Shoulder   Flexion: 138 degrees with pain  Abduction: 118 degrees with pain  External rotation 0°: 50 degrees   Internal rotation BTB: Active internal rotation behind the back: WNL.     Strength/Myotome Testing     Left Shoulder     Planes of Motion   Flexion: 4+   Abduction: 4+   External rotation at 0°: 4-   Internal rotation at 0°: 4     Isolated Muscles   Supraspinatus: 4     Tests     Left Shoulder   Positive active compression ([place]), empty can and Hawkin's.          Assessment & Plan     Assessment  Impairments: abnormal or restricted ROM, activity intolerance, impaired physical strength, lacks appropriate home exercise program and pain with function  Assessment details: Patient presents with c/o pain, limited ROM, decreased strength and positive special testing which is limiting his ability to perform ADL'S.  Barriers to therapy: none  Prognosis: good  Prognosis details: STG's to be met by 4 weeks  1)  Independent with HEP  2)  Decrease pain by 50% or more  3)  AROM WNL for left shoulder    LTG's to be met by 4 weeks  1)  Independent with HEP progression  2)  Decrease pain by  75% or more  3)  Increase strength for left shoulder to 4+ to 5/5  4)  Negative special testing  5)  No TTP to left shoulder  6)  Patient to return to ADL'S without limitations      Goals  no pain    Plan  Therapy options: will be seen for skilled physical therapy services  Planned modality interventions: ultrasound and iontophoresis  Planned therapy interventions: strengthening, stretching, therapeutic activities and home exercise program  Frequency: 2x week  Duration in weeks: 8  Treatment plan discussed with: patient        Manual Therapy:    0     mins  11981;  Therapeutic Exercise:    25     mins  55752;     Neuromuscular Amrik:    0    mins  90472;    Therapeutic Activity:     0     mins  67709;     Gait Trainin     mins  73829;     Ultrasound:     0     mins  31696;    Work Hardening           0      mins 61027  Iontophoresis               0   mins 64482    Timed Treatment:   25   mins   Total Treatment:     35   mins    PT SIGNATURE: Abner Lunsford, PT   DATE TREATMENT INITIATED: 2017    Initial Certification  Certification Period: 9/3/2017  I certify that the therapy services are furnished while this patient is under my care.  The services outlined above are required by this patient, and will be reviewed every 90 days.     PHYSICIAN: García Pacheco MD      DATE:     Please sign and return via fax to [phone].. Thank you, [facility].

## 2017-06-26 ENCOUNTER — DOCUMENTATION (OUTPATIENT)
Dept: PHYSICAL THERAPY | Facility: CLINIC | Age: 42
End: 2017-06-26

## 2017-06-26 NOTE — PROGRESS NOTES
Discharge Summary  Discharge Summary from Physical Therapy Report      Dates  PT visit: 6/5/17  Number of Visits: 1     Discharge Status of Patient: See eval.    Goals: Not Met    Discharge Plan: Patient to return to referring/providing physician    Comments Patient did not return after eval.    Date of Discharge 6/26/17        Abner Lunsford, PT  Physical Therapist

## 2017-07-17 RX ORDER — METHYLPREDNISOLONE 4 MG/1
TABLET ORAL
Qty: 21 TABLET | Refills: 0 | Status: SHIPPED | OUTPATIENT
Start: 2017-07-17 | End: 2018-07-26

## 2017-07-18 ENCOUNTER — OFFICE VISIT (OUTPATIENT)
Dept: FAMILY MEDICINE CLINIC | Facility: CLINIC | Age: 42
End: 2017-07-18

## 2017-07-18 VITALS
SYSTOLIC BLOOD PRESSURE: 120 MMHG | RESPIRATION RATE: 16 BRPM | WEIGHT: 260 LBS | HEIGHT: 72 IN | HEART RATE: 96 BPM | OXYGEN SATURATION: 96 % | BODY MASS INDEX: 35.21 KG/M2 | DIASTOLIC BLOOD PRESSURE: 76 MMHG | TEMPERATURE: 98.2 F

## 2017-07-18 DIAGNOSIS — T63.464A WASP STING, UNDETERMINED INTENT, INITIAL ENCOUNTER: Primary | ICD-10-CM

## 2017-07-18 PROCEDURE — 99213 OFFICE O/P EST LOW 20 MIN: CPT | Performed by: INTERNAL MEDICINE

## 2017-07-18 NOTE — PROGRESS NOTES
"Subjective   Patient ID: Austin Orta is a 42 y.o. male presents with   Chief Complaint   Patient presents with   • Elbow stung by a wasp, swollen     left elbow       HPI - This patient got stung by a wasp on his left elbow about 5 days ago and about 3 days ago it became hot red and swollen.  We sent him in a Medrol pack because Benadryl was not working.  Today he's 25% better.  He had no lip swelling or hives.    Assessment plan    Wasp sting improving with Medrol Dosepak if doesn't fully resolved he's to let me know.    Allergies   Allergen Reactions   • Ciprofloxacin Nausea And Vomiting and Myalgia       The following portions of the patient's history were reviewed and updated as appropriate: allergies, current medications, past family history, past medical history, past social history, past surgical history and problem list.      Review of Systems   Constitutional: Negative.    Respiratory: Negative.    Cardiovascular: Negative.    Skin: Positive for rash.       Objective     Vitals:    07/18/17 1255   BP: 120/76   Pulse: 96   Resp: 16   Temp: 98.2 °F (36.8 °C)   TempSrc: Oral   SpO2: 96%   Weight: 260 lb (118 kg)   Height: 72\" (182.9 cm)         Physical Exam   Constitutional: He is oriented to person, place, and time. He appears well-developed and well-nourished.   HENT:   Head: Normocephalic and atraumatic.   Mouth/Throat: Oropharynx is clear and moist.   Neurological: He is alert and oriented to person, place, and time.   Skin: Skin is warm. Rash noted. There is erythema.   Mild swelling and minimal waning erythema no evidence of infection   Nursing note and vitals reviewed.        Austin was seen today for elbow stung by a wasp, swollen.    Diagnoses and all orders for this visit:    Wasp sting, undetermined intent, initial encounter      Call or return to clinic prn if these symptoms worsen or fail to improve as anticipated.  "

## 2017-08-28 RX ORDER — LEVOCETIRIZINE DIHYDROCHLORIDE 5 MG/1
TABLET, FILM COATED ORAL
Qty: 90 TABLET | Refills: 0 | Status: SHIPPED | OUTPATIENT
Start: 2017-08-28 | End: 2018-01-02 | Stop reason: SDUPTHER

## 2018-01-03 RX ORDER — LEVOCETIRIZINE DIHYDROCHLORIDE 5 MG/1
TABLET, FILM COATED ORAL
Qty: 90 TABLET | Refills: 2 | Status: SHIPPED | OUTPATIENT
Start: 2018-01-03 | End: 2018-12-05 | Stop reason: SDUPTHER

## 2018-01-11 ENCOUNTER — OFFICE VISIT (OUTPATIENT)
Dept: FAMILY MEDICINE CLINIC | Facility: CLINIC | Age: 43
End: 2018-01-11

## 2018-01-11 ENCOUNTER — TELEPHONE (OUTPATIENT)
Dept: FAMILY MEDICINE CLINIC | Facility: CLINIC | Age: 43
End: 2018-01-11

## 2018-01-11 VITALS
WEIGHT: 264 LBS | HEIGHT: 72 IN | OXYGEN SATURATION: 95 % | DIASTOLIC BLOOD PRESSURE: 82 MMHG | HEART RATE: 102 BPM | RESPIRATION RATE: 16 BRPM | SYSTOLIC BLOOD PRESSURE: 120 MMHG | BODY MASS INDEX: 35.76 KG/M2 | TEMPERATURE: 98.5 F

## 2018-01-11 DIAGNOSIS — B34.9 VIRAL SYNDROME: Primary | ICD-10-CM

## 2018-01-11 PROCEDURE — 99213 OFFICE O/P EST LOW 20 MIN: CPT | Performed by: INTERNAL MEDICINE

## 2018-01-11 NOTE — PROGRESS NOTES
"Subjective   Patient ID: Austin Orta is a 42 y.o. male presents with   Chief Complaint   Patient presents with   • Sinusitis       HPI - This patient's been sick off and on for a few weeks.  It got worse last week , and then improved his symptoms are mild in intensity. .  He had a sore throat and went to an urgent care center they put him on amoxicillin he's slightly better but still fatigued.  No fever or trouble swallowing no purulent discharge.    Assessment plan    Viral syndrome-recommend discontinuing amoxicillin and recommend rest and supportive treatment if not getting better patient's let me know.  He agreed.    Allergies   Allergen Reactions   • Ciprofloxacin Nausea And Vomiting and Myalgia       The following portions of the patient's history were reviewed and updated as appropriate: allergies, current medications, past family history, past medical history, past social history, past surgical history and problem list.      Review of Systems   Constitutional: Positive for fatigue. Negative for fever.   HENT: Positive for congestion.    Eyes: Negative.    Respiratory: Negative.    Cardiovascular: Negative.        Objective     Vitals:    01/11/18 1003   BP: 120/82   Pulse: 102   Resp: 16   Temp: 98.5 °F (36.9 °C)   TempSrc: Oral   SpO2: 95%   Weight: 120 kg (264 lb)   Height: 182.9 cm (72\")         Physical Exam   Constitutional: He appears well-developed and well-nourished.   HENT:   Head: Normocephalic and atraumatic.   Eyes: EOM are normal. Pupils are equal, round, and reactive to light.   Cardiovascular: Normal rate, regular rhythm and normal heart sounds.    Pulmonary/Chest: Effort normal and breath sounds normal.   Psychiatric: He has a normal mood and affect. His behavior is normal.   Nursing note and vitals reviewed.        Austin was seen today for sinusitis.    Diagnoses and all orders for this visit:    Viral syndrome        Call or return to clinic prn if these symptoms worsen or fail to " improve as anticipated.

## 2018-02-06 ENCOUNTER — OFFICE VISIT (OUTPATIENT)
Dept: FAMILY MEDICINE CLINIC | Facility: CLINIC | Age: 43
End: 2018-02-06

## 2018-02-06 ENCOUNTER — HOSPITAL ENCOUNTER (OUTPATIENT)
Dept: GENERAL RADIOLOGY | Facility: HOSPITAL | Age: 43
Discharge: HOME | End: 2018-02-06
Admitting: INTERNAL MEDICINE

## 2018-02-06 ENCOUNTER — HOSPITAL ENCOUNTER (OUTPATIENT)
Dept: GENERAL RADIOLOGY | Facility: HOSPITAL | Age: 43
Discharge: HOME | End: 2018-02-06

## 2018-02-06 ENCOUNTER — TELEPHONE (OUTPATIENT)
Dept: FAMILY MEDICINE CLINIC | Facility: CLINIC | Age: 43
End: 2018-02-06

## 2018-02-06 VITALS
TEMPERATURE: 97.8 F | HEART RATE: 96 BPM | BODY MASS INDEX: 36.03 KG/M2 | HEIGHT: 72 IN | OXYGEN SATURATION: 95 % | SYSTOLIC BLOOD PRESSURE: 120 MMHG | RESPIRATION RATE: 16 BRPM | WEIGHT: 266 LBS | DIASTOLIC BLOOD PRESSURE: 76 MMHG

## 2018-02-06 DIAGNOSIS — R10.9 ABDOMINAL PAIN, UNSPECIFIED ABDOMINAL LOCATION: ICD-10-CM

## 2018-02-06 DIAGNOSIS — M25.551 PAIN OF BOTH HIP JOINTS: Primary | ICD-10-CM

## 2018-02-06 DIAGNOSIS — M25.552 PAIN OF BOTH HIP JOINTS: Primary | ICD-10-CM

## 2018-02-06 DIAGNOSIS — M54.50 ACUTE BILATERAL LOW BACK PAIN WITHOUT SCIATICA: ICD-10-CM

## 2018-02-06 PROCEDURE — 74019 RADEX ABDOMEN 2 VIEWS: CPT

## 2018-02-06 PROCEDURE — 72100 X-RAY EXAM L-S SPINE 2/3 VWS: CPT

## 2018-02-06 PROCEDURE — 99213 OFFICE O/P EST LOW 20 MIN: CPT | Performed by: INTERNAL MEDICINE

## 2018-02-06 PROCEDURE — 73521 X-RAY EXAM HIPS BI 2 VIEWS: CPT

## 2018-02-06 RX ORDER — MELOXICAM 15 MG/1
15 TABLET ORAL DAILY
Qty: 30 TABLET | Refills: 1 | Status: SHIPPED | OUTPATIENT
Start: 2018-02-06 | End: 2018-03-23 | Stop reason: SDUPTHER

## 2018-02-06 NOTE — PROGRESS NOTES
"Subjective   Patient ID: Austin Orta is a 43 y.o. male presents with   Chief Complaint   Patient presents with   • Pain in the hip into the groin and down he leg       HPI - This patient presents today with complaints of pain in both hips this been going on for about 2 weeks.  It started in his lumbar area and has radiated around.  He has no fever no GI symptoms.  Pain is moderate in intensity and does not seem to be abating.    Assessment plan    Pain in both hips lower abdominal pain and lumbar pain we'll get x-rays and put him on meloxicam.  If not getting better he's to let me know we'll work this up further.  He agreed.    Allergies   Allergen Reactions   • Ciprofloxacin Nausea And Vomiting and Myalgia       The following portions of the patient's history were reviewed and updated as appropriate: allergies, current medications, past family history, past medical history, past social history, past surgical history and problem list.      Review of Systems   Constitutional: Negative for fever.   Gastrointestinal: Positive for abdominal pain. Negative for constipation, diarrhea, nausea and vomiting.   Genitourinary: Negative for dysuria, frequency and hematuria.   Musculoskeletal: Positive for myalgias. Negative for arthralgias.   Neurological: Negative for headaches.       Objective     Vitals:    02/06/18 0921   BP: 120/76   Pulse: 96   Resp: 16   Temp: 97.8 °F (36.6 °C)   TempSrc: Oral   SpO2: 95%   Weight: 121 kg (266 lb)   Height: 182.9 cm (72\")         Physical Exam   Constitutional: He appears well-developed and well-nourished.   Abdominal: Soft. He exhibits no mass. There is tenderness. There is no rebound and no guarding. No hernia.   Mild tenderness in the pubic region no hernia or masses were palpated.  No guarding or rebound   Psychiatric: He has a normal mood and affect. His behavior is normal.   Nursing note and vitals reviewed.        Austin was seen today for pain in the hip into the groin and down " he leg.    Diagnoses and all orders for this visit:    Pain of both hip joints  -     XR Hips Bilateral With or Without Pelvis 2 View  -     XR Abdomen Flat & Upright  -     XR Spine Lumbar 2 or 3 View    Abdominal pain, unspecified abdominal location  -     XR Hips Bilateral With or Without Pelvis 2 View  -     XR Abdomen Flat & Upright  -     XR Spine Lumbar 2 or 3 View    Acute bilateral low back pain without sciatica  -     XR Hips Bilateral With or Without Pelvis 2 View  -     XR Abdomen Flat & Upright  -     XR Spine Lumbar 2 or 3 View    Other orders  -     meloxicam (MOBIC) 15 MG tablet; Take 1 tablet by mouth Daily.        Call or return to clinic prn if these symptoms worsen or fail to improve as anticipated.

## 2018-02-19 RX ORDER — GUAIFENESIN, PSEUDOEPHEDRINE HYDROCHLORIDE 600; 60 MG/1; MG/1
TABLET ORAL
Qty: 36 TABLET | Refills: 0 | Status: SHIPPED | OUTPATIENT
Start: 2018-02-19 | End: 2018-06-25 | Stop reason: SDUPTHER

## 2018-03-23 RX ORDER — MELOXICAM 15 MG/1
15 TABLET ORAL DAILY
Qty: 90 TABLET | Refills: 0 | Status: SHIPPED | OUTPATIENT
Start: 2018-03-23 | End: 2018-05-29

## 2018-04-27 ENCOUNTER — OFFICE VISIT (OUTPATIENT)
Dept: FAMILY MEDICINE CLINIC | Facility: CLINIC | Age: 43
End: 2018-04-27

## 2018-04-27 VITALS
TEMPERATURE: 97.6 F | SYSTOLIC BLOOD PRESSURE: 122 MMHG | HEIGHT: 72 IN | BODY MASS INDEX: 35.54 KG/M2 | HEART RATE: 96 BPM | DIASTOLIC BLOOD PRESSURE: 80 MMHG | OXYGEN SATURATION: 95 % | WEIGHT: 262.4 LBS

## 2018-04-27 DIAGNOSIS — Z23 IMMUNIZATION DUE: ICD-10-CM

## 2018-04-27 DIAGNOSIS — K52.9 GASTROENTERITIS, ACUTE: Primary | ICD-10-CM

## 2018-04-27 PROCEDURE — 90632 HEPA VACCINE ADULT IM: CPT | Performed by: NURSE PRACTITIONER

## 2018-04-27 PROCEDURE — 90471 IMMUNIZATION ADMIN: CPT | Performed by: NURSE PRACTITIONER

## 2018-04-27 PROCEDURE — 99213 OFFICE O/P EST LOW 20 MIN: CPT | Performed by: NURSE PRACTITIONER

## 2018-04-27 NOTE — PATIENT INSTRUCTIONS
Discharge instructions  Push fluids with electrolytes  Brat diet bananas rice applesauce toast  Pepto-Bismol  Recheck if not improved next week  If severe persistent pain fever chills increased nausea vomiting emergency room    Calories 2000 daily  Decrease processed sweets increase fiber  Portion control  Avoid sodas etc.      Food Choices to Help Relieve Diarrhea, Adult  When you have diarrhea, the foods you eat and your eating habits are very important. Choosing the right foods and drinks can help:  · Relieve diarrhea.  · Replace lost fluids and nutrients.  · Prevent dehydration.  What general guidelines should I follow?  Relieving diarrhea   · Choose foods with less than 2 g or .07 oz. of fiber per serving.  · Limit fats to less than 8 tsp (38 g or 1.34 oz.) a day.  · Avoid the following:  ¨ Foods and beverages sweetened with high-fructose corn syrup, honey, or sugar alcohols such as xylitol, sorbitol, and mannitol.  ¨ Foods that contain a lot of fat or sugar.  ¨ Fried, greasy, or spicy foods.  ¨ High-fiber grains, breads, and cereals.  ¨ Raw fruits and vegetables.  · Eat foods that are rich in probiotics. These foods include dairy products such as yogurt and fermented milk products. They help increase healthy bacteria in the stomach and intestines (gastrointestinal tract, or GI tract).  · If you have lactose intolerance, avoid dairy products. These may make your diarrhea worse.  · Take medicine to help stop diarrhea (antidiarrheal medicine) only as told by your health care provider.  Replacing nutrients   · Eat small meals or snacks every 3-4 hours.  · Eat bland foods, such as white rice, toast, or baked potato, until your diarrhea starts to get better. Gradually reintroduce nutrient-rich foods as tolerated or as told by your health care provider. This includes:  ¨ Well-cooked protein foods.  ¨ Peeled, seeded, and soft-cooked fruits and vegetables.  ¨ Low-fat dairy products.  · Take vitamin and mineral  supplements as told by your health care provider.  Preventing dehydration     · Start by sipping water or a special solution to prevent dehydration (oral rehydration solution, ORS). Urine that is clear or pale yellow means that you are getting enough fluid.  · Try to drink at least 8-10 cups of fluid each day to help replace lost fluids.  · You may add other liquids in addition to water, such as clear juice or decaffeinated sports drinks, as tolerated or as told by your health care provider.  · Avoid drinks with caffeine, such as coffee, tea, or soft drinks.  · Avoid alcohol.  What foods are recommended?  The items listed may not be a complete list. Talk with your health care provider about what dietary choices are best for you.  Grains   White rice. White, Ukrainian, or tomy breads (fresh or toasted), including plain rolls, buns, or bagels. White pasta. Saltine, soda, or nicolas crackers. Pretzels. Low-fiber cereal. Cooked cereals made with water (such as cornmeal, farina, or cream cereals). Plain muffins. Matzo. [place] toast. [place].  Vegetables   Potatoes (without the skin). Most well-cooked and canned vegetables without skins or seeds. Tender lettuce.  Fruits   Apple sauce. Fruits canned in juice. Cooked apricots, cherries, grapefruit, peaches, pears, or plums. Fresh bananas and cantaloupe.  Meats and other protein foods   Baked or boiled chicken. Eggs. Tofu. Fish. Seafood. Smooth nut butters. Ground or well-cooked tender beef, ham, veal, lamb, pork, or poultry.  Dairy   Plain yogurt, kefir, and unsweetened liquid yogurt. Lactose-free milk, buttermilk, skim milk, or soy milk. Low-fat or nonfat hard cheese.  Beverages   Water. Low-calorie sports drinks. Fruit juices without pulp. Strained tomato and vegetable juices. Decaffeinated teas. Sugar-free beverages not sweetened with sugar alcohols. Oral rehydration solutions, if approved by your health care provider.  Seasoning and other foods   Bouillon, broth, or soups  made from recommended foods.  What foods are not recommended?  The items listed may not be a complete list. Talk with your health care provider about what dietary choices are best for you.  Grains   Whole grain, whole wheat, bran, or rye breads, rolls, pastas, and crackers. Wild or brown rice. Whole grain or bran cereals. Barley. Oats and oatmeal. Corn tortillas or taco shells. Granola. Popcorn.  Vegetables   Raw vegetables. Fried vegetables. Cabbage, broccoli, [place] sprouts, artichokes, baked beans, beet greens, corn, kale, legumes, peas, sweet potatoes, and yams. Potato skins. Cooked spinach and cabbage.  Fruits   Dried fruit, including raisins and dates. Raw fruits. Stewed or dried prunes. Canned fruits with syrup.  Meat and other protein foods   Fried or fatty meats. Deli meats. [place] nut butters. Nuts and seeds. Beans and lentils. Burks. Hot dogs. Sausage.  Dairy   High-fat cheeses. Whole milk, chocolate milk, and beverages made with milk, such as milk shakes. Half-and-half. Cream. sour cream. Ice cream.  Beverages   Caffeinated beverages (such as coffee, tea, soda, or energy drinks). Alcoholic beverages. Fruit juices with pulp. Prune juice. Soft drinks sweetened with high-fructose corn syrup or sugar alcohols. High-calorie sports drinks.  Fats and oils   Butter. Cream sauces. Margarine. Salad oils. Plain salad dressings. Olives. Avocados. Mayonnaise.  Sweets and desserts   Sweet rolls, doughnuts, and sweet breads. Sugar-free desserts sweetened with sugar alcohols such as xylitol and sorbitol.  Seasoning and other foods   Honey. Hot sauce. Chili powder. Gravy. Cream-based or milk-based soups. Pancakes and waffles.  Summary  · When you have diarrhea, the foods you eat and your eating habits are very important.  · Make sure you get at least 8-10 cups of fluid each day, or enough to keep your urine clear or pale yellow.  · Eat bland foods and gradually reintroduce healthy, nutrient-rich foods as tolerated, or  as told by your health care provider.  · Avoid high-fiber, fried, greasy, or spicy foods.  This information is not intended to replace advice given to you by your health care provider. Make sure you discuss any questions you have with your health care provider.  Document Released: 03/09/2005 Document Revised: 12/15/2017 Document Reviewed: 12/15/2017  [employer] Interactive Patient Education © 2017 [employer]    Viral Gastroenteritis, Adult  Viral gastroenteritis is also known as the stomach flu. This condition is caused by various viruses. These viruses can be passed from person to person very easily (are very contagious). This condition may affect your stomach, small intestine, and large intestine. It can cause sudden watery diarrhea, fever, and vomiting.  Diarrhea and vomiting can make you feel weak and cause you to become dehydrated. You may not be able to keep fluids down. Dehydration can make you tired and thirsty, cause you to have a dry mouth, and decrease how often you urinate. Older adults and people with other diseases or a weak immune system are at higher risk for dehydration.  It is important to replace the fluids that you lose from diarrhea and vomiting. If you become severely dehydrated, you may need to get fluids through an IV tube.  What are the causes?  Gastroenteritis is caused by various viruses, including rotavirus and norovirus. Norovirus is the most common cause in adults.  You can get sick by eating food, drinking water, or touching a surface contaminated with one of these viruses. You can also get sick from sharing utensils or other personal items with an infected person.  What increases the risk?  This condition is more likely to develop in people:  · Who have a weak defense system (immune system).  · Who live with one or more children who are younger than 2 years old.  · Who live in a nursing home.  · Who go on cruise ships.  What are the signs or symptoms?  Symptoms of this condition  start suddenly 1-2 days after exposure to a virus. Symptoms may last a few days or as long as a week. The most common symptoms are watery diarrhea and vomiting. Other symptoms include:  · Fever.  · Headache.  · Fatigue.  · Pain in the abdomen.  · Chills.  · Weakness.  · Nausea.  · Muscle aches.  · Loss of appetite.  How is this diagnosed?  This condition is diagnosed with a medical history and physical exam. You may also have a stool test to check for viruses or other infections.  How is this treated?  This condition typically goes away on its own. The focus of treatment is to restore lost fluids (rehydration). Your health care provider may recommend that you take an oral rehydration solution (ORS) to replace important salts and minerals (electrolytes) in your body. Severe cases of this condition may require giving fluids through an IV tube.  Treatment may also include medicine to help with your symptoms.  Follow these instructions at home:  Follow instructions from your health care provider about how to care for yourself at home.  Eating and drinking   Follow these recommendations as told by your health care provider:  · Take an ORS. This is a drink that is sold at pharmacies and retail stores.  · Drink clear fluids in small amounts as you are able. Clear fluids include water, ice chips, diluted fruit juice, and low-calorie sports drinks.  · Eat bland, easy-to-digest foods in small amounts as you are able. These foods include bananas, applesauce, rice, lean meats, toast, and crackers.  · Avoid fluids that contain a lot of sugar or caffeine, such as energy drinks, sports drinks, and soda.  · Avoid alcohol.  · Avoid spicy or fatty foods.  General instructions     · Drink enough fluid to keep your urine clear or pale yellow.  · Wash your hands often. If soap and water are not available, use hand .  · Make sure that all people in your household wash their hands well and often.  · Take over-the-counter and  prescription medicines only as told by your health care provider.  · Rest at home while you recover.  · Watch your condition for any changes.  · Take a warm bath to relieve any burning or pain from frequent diarrhea episodes.  · Keep all follow-up visits as told by your health care provider. This is important.  Contact a health care provider if:  · You cannot keep fluids down.  · Your symptoms get worse.  · You have new symptoms.  · You feel light-headed or dizzy.  · You have muscle cramps.  Get help right away if:  · You have chest pain.  · You feel extremely weak or you faint.  · You see blood in your vomit.  · Your vomit looks like coffee grounds.  · You have bloody or black stools or stools that look like tar.  · You have a severe headache, a stiff neck, or both.  · You have a rash.  · You have severe pain, cramping, or bloating in your abdomen.  · You have trouble breathing or you are breathing very quickly.  · Your heart is beating very quickly.  · Your skin feels cold and clammy.  · You feel confused.  · You have pain when you urinate.  · You have signs of dehydration, such as:  ¨ Dark urine, very little urine, or no urine.  ¨ Cracked lips.  ¨ Dry mouth.  ¨ Sunken eyes.  ¨ Sleepiness.  ¨ Weakness.  This information is not intended to replace advice given to you by your health care provider. Make sure you discuss any questions you have with your health care provider.  Document Released: 12/18/2006 Document Revised: 05/31/2017 Document Reviewed: 08/23/2016  [employer] Interactive Patient Education © 2017 [employer]

## 2018-04-27 NOTE — PROGRESS NOTES
Subjective   Austin Orta is a 43 y.o. male.         C/o  diarhea  mostly after eating   Stomach ache fri  immodium 4  Sat no help    tue took 3  [employer]  No fever no chills    Better    pepto bismo  yest helped  crampy    No travel    No fever no chills no severe pain  Denies severe illness  No jaundice  Urine clear  No recent travel      Sh  cholecytomy                        Diarrhea    Pertinent negatives include no abdominal pain, chills or fever.        The following portions of the patient's history were reviewed and updated as appropriate: allergies, current medications, past medical history, past social history, past surgical history and problem list.    Review of Systems   Constitutional: Negative for chills, fatigue and fever.   HENT: Negative.    Eyes: Negative.    Respiratory: Negative.    Gastrointestinal: Positive for diarrhea. Negative for abdominal pain.   Genitourinary: Negative.    Musculoskeletal: Negative.    Psychiatric/Behavioral: Negative.        Objective   Physical Exam   Constitutional: He is oriented to person, place, and time. He appears well-developed and well-nourished. No distress.   HENT:   Head: Normocephalic and atraumatic.   Nose: Nose normal.   Mouth/Throat: Oropharynx is clear and moist.   Eyes: Conjunctivae are normal. Pupils are equal, round, and reactive to light.   Neck: Neck supple.   Cardiovascular: Normal rate, regular rhythm and normal heart sounds.    No murmur heard.  Pulmonary/Chest: Effort normal and breath sounds normal. No respiratory distress. He has no wheezes.   Abdominal: Soft. Bowel sounds are normal. He exhibits no distension and no mass. There is no tenderness. There is no rebound and no guarding. No hernia.   Musculoskeletal: He exhibits no edema or tenderness.   Lymphadenopathy:     He has no cervical adenopathy.   Neurological: He is alert and oriented to person, place, and time.   Skin: Skin is warm and dry. He is not diaphoretic.    Psychiatric: He has a normal mood and affect. His behavior is normal. Judgment and thought content normal.   Vitals reviewed.        Assessment/Plan   Austin was seen today for diarrhea.    Diagnoses and all orders for this visit:    Gastroenteritis, acute    Immunization due  -     Hepatitis A Vaccine Adult IM                Discharge instructions  Push fluids with electrolytes  Brat diet bananas rice applesauce toast  Pepto-Bismol  Recheck if not improved next week  If severe persistent pain fever chills increased nausea vomiting emergency room    Calories 2000 daily  Decrease processed sweets increase fiber  Portion control  Avoid sodas etc.

## 2018-05-14 RX ORDER — OMEPRAZOLE 20 MG/1
CAPSULE, DELAYED RELEASE ORAL
Qty: 90 CAPSULE | Refills: 0 | Status: SHIPPED | OUTPATIENT
Start: 2018-05-14 | End: 2018-08-08 | Stop reason: SDUPTHER

## 2018-05-25 DIAGNOSIS — F32.A DEPRESSION, UNSPECIFIED DEPRESSION TYPE: Primary | ICD-10-CM

## 2018-05-29 RX ORDER — ESCITALOPRAM OXALATE 10 MG/1
TABLET ORAL
Qty: 90 TABLET | Refills: 1 | Status: SHIPPED | OUTPATIENT
Start: 2018-05-29 | End: 2018-12-05 | Stop reason: SDUPTHER

## 2018-06-13 RX ORDER — MELOXICAM 15 MG/1
15 TABLET ORAL DAILY
Qty: 90 TABLET | Refills: 0 | Status: SHIPPED | OUTPATIENT
Start: 2018-06-13 | End: 2018-08-08 | Stop reason: SDUPTHER

## 2018-06-25 RX ORDER — GUAIFENESIN, PSEUDOEPHEDRINE HYDROCHLORIDE 600; 60 MG/1; MG/1
1 TABLET, EXTENDED RELEASE ORAL EVERY 12 HOURS
Qty: 60 TABLET | Refills: 0 | Status: SHIPPED | OUTPATIENT
Start: 2018-06-25 | End: 2018-07-26

## 2018-06-26 NOTE — TELEPHONE ENCOUNTER
I noticed that he is also on flomax for prostate .  Sudafed makes prostate issues much worse.  Also, is this something he takes bid every day?  whats his blood pressure run?

## 2018-06-26 NOTE — TELEPHONE ENCOUNTER
Pt didn't know about the prostate and medication. He did say he only takes about 1 time a month for 3 days. For his ear. I told him to watch b/p when taking. He says he doesn't check bp

## 2018-07-26 ENCOUNTER — OFFICE VISIT (OUTPATIENT)
Dept: FAMILY MEDICINE CLINIC | Facility: CLINIC | Age: 43
End: 2018-07-26

## 2018-07-26 VITALS
WEIGHT: 262 LBS | DIASTOLIC BLOOD PRESSURE: 70 MMHG | HEART RATE: 101 BPM | SYSTOLIC BLOOD PRESSURE: 118 MMHG | HEIGHT: 72 IN | BODY MASS INDEX: 35.49 KG/M2 | OXYGEN SATURATION: 98 %

## 2018-07-26 DIAGNOSIS — F32.A DEPRESSION, UNSPECIFIED DEPRESSION TYPE: ICD-10-CM

## 2018-07-26 DIAGNOSIS — G47.09 OTHER INSOMNIA: ICD-10-CM

## 2018-07-26 DIAGNOSIS — S86.019A STRAIN OF ACHILLES TENDON, INITIAL ENCOUNTER: Primary | ICD-10-CM

## 2018-07-26 DIAGNOSIS — Z00.00 HEALTH MAINTENANCE EXAMINATION: ICD-10-CM

## 2018-07-26 PROCEDURE — 99214 OFFICE O/P EST MOD 30 MIN: CPT | Performed by: NURSE PRACTITIONER

## 2018-07-26 RX ORDER — TRAZODONE HYDROCHLORIDE 50 MG/1
50 TABLET ORAL NIGHTLY
Qty: 30 TABLET | Refills: 6 | Status: SHIPPED | OUTPATIENT
Start: 2018-07-26 | End: 2019-01-07

## 2018-07-26 RX ORDER — AZELASTINE 1 MG/ML
SPRAY, METERED NASAL
Qty: 1 EACH | Refills: 12 | Status: SHIPPED | OUTPATIENT
Start: 2018-07-26 | End: 2019-01-25 | Stop reason: HOSPADM

## 2018-07-26 NOTE — PATIENT INSTRUCTIONS
Discharge instructions    Daily stretching  Slowly advance  If increased pain stop    Continue meloxicam anti-inflammatory    Sports medicine further evaluation and treatment    If increased pain redness swelling  Urgent care ER    Trazodone as discussed  Maximum 2 per day  Lowest effective dose    google sleep tips insomnia    Calories 2000 daily  Increase vegetables increase fiber decrease processed sweets breads pasta rinse dressing barbecue sauce ketchup casseroles  Increase lean meat such as chicken     vitamin D3  1000 international units daily to maintain healthy levels    Check in 6 months       trial Astelin 6 weeks eustachian tube    See ENT if not better

## 2018-07-26 NOTE — PROGRESS NOTES
Subjective   Austin Orta is a 43 y.o. male.     Approximate 3 weeks ago approximately 3 weeks ago playing softball running into base step onto base  Felt immediate severe pain right Achilles  Was unable to finish the game  Was able to immediately bear weight with a limp  Has swelling and ecchymosis started that night  Posterior calcaneus  Improved with pain the first week  Continue to be able to bear weight  Much better last week but tried to play last night exacerbating right calcaneus pain without swelling  Takes meloxicam daily helps some  No history of ankle or calcaneus severe injuries    Would like to play softball soon as he can    Right ear pops at times sinus congestion Flonase antihistamine  For allergies    Needs fasting labs  Stable Lexapro depression anxiety  Insomnia takes Ambien  We have previously discussed alternatives  Presently he is down to approximately 2.5 mg    We discussed alternatives only if needed risk and benefit  Always better not to take a sleep aid  Discussed melatonin does not work  Sleep hygiene  Trazodone risk-benefit  Caution sedation possible          Foot Pain          The following portions of the patient's history were reviewed and updated as appropriate: allergies, current medications, past family history, past social history, past surgical history and problem list.    Review of Systems   HENT:        Right ear popping no pain or pulsation   Musculoskeletal: Positive for gait problem.   All other systems reviewed and are negative.      Objective   Physical Exam   Constitutional: He is oriented to person, place, and time. He appears well-developed and well-nourished. No distress.   HENT:   Head: Normocephalic and atraumatic.   Nose: Nose normal.   Mouth/Throat: Oropharynx is clear and moist.   Turbinates congested narrow  Right TM partial wax TM dull   Eyes: Pupils are equal, round, and reactive to light. Conjunctivae are normal.   Neck: Neck supple.   Cardiovascular: Normal  rate, regular rhythm and normal heart sounds.    No murmur heard.  Pulmonary/Chest: Effort normal and breath sounds normal. No respiratory distress. He has no wheezes.   Musculoskeletal: Normal range of motion. He exhibits tenderness. He exhibits no deformity.   Gait slight limp  Able to bear weight  Full range of motion right ankle right Achilles  Mild to moderate tenderness over Achilles without induration or deformity  Negative Zimmerman test  No apparent disruption of the Achilles tendon  Normal nontender without swelling   Lymphadenopathy:     He has no cervical adenopathy.   Neurological: He is alert and oriented to person, place, and time.   Skin: Skin is warm and dry. He is not diaphoretic.   Psychiatric: He has a normal mood and affect. His behavior is normal. Judgment and thought content normal.   Vitals reviewed.        Assessment/Plan   Austin was seen today for foot pain.    Diagnoses and all orders for this visit:    Strain of Achilles tendon, initial encounter  -     Ambulatory Referral to Sports Medicine  -     Comprehensive Metabolic Panel  -     CBC & Differential  -     TSH Rfx On Abnormal To Free T4  -     Lipid Panel With LDL / HDL Ratio  -     Urinalysis With Microscopic If Indicated (No Culture) - Urine, Clean Catch    Other insomnia  -     Comprehensive Metabolic Panel  -     CBC & Differential  -     TSH Rfx On Abnormal To Free T4  -     Lipid Panel With LDL / HDL Ratio  -     Urinalysis With Microscopic If Indicated (No Culture) - Urine, Clean Catch    Depression, unspecified depression type  -     Comprehensive Metabolic Panel  -     CBC & Differential  -     TSH Rfx On Abnormal To Free T4  -     Lipid Panel With LDL / HDL Ratio  -     Urinalysis With Microscopic If Indicated (No Culture) - Urine, Clean Catch    Health maintenance examination  -     Comprehensive Metabolic Panel  -     CBC & Differential  -     TSH Rfx On Abnormal To Free T4  -     Lipid Panel With LDL / HDL Ratio  -      Urinalysis With Microscopic If Indicated (No Culture) - Urine, Clean Catch    Other orders  -     traZODone (DESYREL) 50 MG tablet; Take 1 tablet by mouth Every Night. As needed for insomnia  -     azelastine (ASTELIN) 0.1 % nasal spray; 1-2 sprays twice daily as needed for nasal congestion                  Discharge instructions    Daily stretching  Slowly advance  If increased pain stop    Continue meloxicam anti-inflammatory    Sports medicine further evaluation and treatment    If increased pain redness swelling  Urgent care ER    Trazodone as discussed  Maximum 2 per day  Lowest effective dose    google sleep tips insomnia    Calories 2000 daily  Increase vegetables increase fiber decrease processed sweets breads pasta rinse dressing barbecue sauce ketchup casseroles  Increase lean meat such as chicken     vitamin D3  1000 international units daily to maintain healthy levels    Check in 6 months       trial Astelin 6 weeks eustachian tube    See ENT if not better

## 2018-07-27 ENCOUNTER — OFFICE VISIT (OUTPATIENT)
Dept: SPORTS MEDICINE | Facility: CLINIC | Age: 43
End: 2018-07-27

## 2018-07-27 VITALS
DIASTOLIC BLOOD PRESSURE: 90 MMHG | BODY MASS INDEX: 35.35 KG/M2 | OXYGEN SATURATION: 98 % | HEIGHT: 72 IN | HEART RATE: 72 BPM | SYSTOLIC BLOOD PRESSURE: 120 MMHG | WEIGHT: 261 LBS

## 2018-07-27 DIAGNOSIS — M25.571 ACUTE RIGHT ANKLE PAIN: Primary | ICD-10-CM

## 2018-07-27 DIAGNOSIS — M76.61 RIGHT ACHILLES TENDINITIS: ICD-10-CM

## 2018-07-27 LAB
ALBUMIN SERPL-MCNC: 4.7 G/DL (ref 3.5–5.2)
ALBUMIN/GLOB SERPL: 1.8 G/DL
ALP SERPL-CCNC: 87 U/L (ref 39–117)
ALT SERPL-CCNC: 25 U/L (ref 1–41)
APPEARANCE UR: CLEAR
AST SERPL-CCNC: 17 U/L (ref 1–40)
BASOPHILS # BLD AUTO: 0.02 10*3/MM3 (ref 0–0.2)
BASOPHILS NFR BLD AUTO: 0.2 % (ref 0–1.5)
BILIRUB SERPL-MCNC: 0.9 MG/DL (ref 0.1–1.2)
BILIRUB UR QL STRIP: NEGATIVE
BUN SERPL-MCNC: 14 MG/DL (ref 6–20)
BUN/CREAT SERPL: 17.9 (ref 7–25)
CALCIUM SERPL-MCNC: 9.7 MG/DL (ref 8.6–10.5)
CHLORIDE SERPL-SCNC: 98 MMOL/L (ref 98–107)
CHOLEST SERPL-MCNC: 164 MG/DL (ref 0–200)
CO2 SERPL-SCNC: 28.1 MMOL/L (ref 22–29)
COLOR UR: YELLOW
CREAT SERPL-MCNC: 0.78 MG/DL (ref 0.76–1.27)
EOSINOPHIL # BLD AUTO: 0.11 10*3/MM3 (ref 0–0.7)
EOSINOPHIL NFR BLD AUTO: 1.3 % (ref 0.3–6.2)
ERYTHROCYTE [DISTWIDTH] IN BLOOD BY AUTOMATED COUNT: 12.6 % (ref 11.5–14.5)
GLOBULIN SER CALC-MCNC: 2.6 GM/DL
GLUCOSE SERPL-MCNC: 92 MG/DL (ref 65–99)
GLUCOSE UR QL: NEGATIVE
HCT VFR BLD AUTO: 47.5 % (ref 40.4–52.2)
HDLC SERPL-MCNC: 39 MG/DL (ref 40–60)
HGB BLD-MCNC: 15.1 G/DL (ref 13.7–17.6)
HGB UR QL STRIP: NEGATIVE
IMM GRANULOCYTES # BLD: 0.03 10*3/MM3 (ref 0–0.03)
IMM GRANULOCYTES NFR BLD: 0.4 % (ref 0–0.5)
KETONES UR QL STRIP: NEGATIVE
LDLC SERPL CALC-MCNC: 93 MG/DL (ref 0–100)
LDLC/HDLC SERPL: 2.39 {RATIO}
LEUKOCYTE ESTERASE UR QL STRIP: NEGATIVE
LYMPHOCYTES # BLD AUTO: 2.06 10*3/MM3 (ref 0.9–4.8)
LYMPHOCYTES NFR BLD AUTO: 24.5 % (ref 19.6–45.3)
MCH RBC QN AUTO: 28.4 PG (ref 27–32.7)
MCHC RBC AUTO-ENTMCNC: 31.8 G/DL (ref 32.6–36.4)
MCV RBC AUTO: 89.3 FL (ref 79.8–96.2)
MONOCYTES # BLD AUTO: 0.93 10*3/MM3 (ref 0.2–1.2)
MONOCYTES NFR BLD AUTO: 11.1 % (ref 5–12)
NEUTROPHILS # BLD AUTO: 5.26 10*3/MM3 (ref 1.9–8.1)
NEUTROPHILS NFR BLD AUTO: 62.5 % (ref 42.7–76)
NITRITE UR QL STRIP: NEGATIVE
PH UR STRIP: 6 [PH] (ref 5–8)
PLATELET # BLD AUTO: 289 10*3/MM3 (ref 140–500)
POTASSIUM SERPL-SCNC: 4.7 MMOL/L (ref 3.5–5.2)
PROT SERPL-MCNC: 7.3 G/DL (ref 6–8.5)
PROT UR QL STRIP: NEGATIVE
RBC # BLD AUTO: 5.32 10*6/MM3 (ref 4.6–6)
SODIUM SERPL-SCNC: 139 MMOL/L (ref 136–145)
SP GR UR: 1.02 (ref 1–1.03)
TRIGL SERPL-MCNC: 159 MG/DL (ref 0–150)
TSH SERPL DL<=0.005 MIU/L-ACNC: 2.94 MIU/ML (ref 0.27–4.2)
UROBILINOGEN UR STRIP-MCNC: NORMAL MG/DL
VLDLC SERPL CALC-MCNC: 31.8 MG/DL (ref 5–40)
WBC # BLD AUTO: 8.41 10*3/MM3 (ref 4.5–10.7)

## 2018-07-27 PROCEDURE — 73610 X-RAY EXAM OF ANKLE: CPT | Performed by: FAMILY MEDICINE

## 2018-07-27 PROCEDURE — 99244 OFF/OP CNSLTJ NEW/EST MOD 40: CPT | Performed by: FAMILY MEDICINE

## 2018-07-27 RX ORDER — NITROGLYCERIN 0.1MG/HR
PATCH, TRANSDERMAL 24 HOURS TRANSDERMAL
Qty: 10 PATCH | Refills: 0 | Status: SHIPPED | OUTPATIENT
Start: 2018-07-27 | End: 2019-01-07

## 2018-07-27 NOTE — PROGRESS NOTES
"Austin is a 43 y.o. year old male    Chief Complaint   Patient presents with   • Foot Pain     (R) // No previous xrays // x 3 weeks ago      Referred here by James Epley.    History of Present Illness  Ankle Injury    Incident onset: 3 wks. Incident location: playing softball, turning from 3rd base, abruptly stopped and landed awkwardly on base. The pain is present in the right ankle. The pain is moderate. The pain has been fluctuating since onset. Pertinent negatives include no inability to bear weight or numbness. He has tried ice and NSAIDs for the symptoms. The treatment provided mild relief.          I have reviewed the patient's medical, family, and social history in detail and updated the computerized patient record.    Review of Systems   Constitutional: Negative for fever.   Musculoskeletal:        Per HPI   Skin: Negative for rash.   Neurological: Negative for weakness and numbness.   Psychiatric/Behavioral: Negative for sleep disturbance.   All other systems reviewed and are negative.      /90   Pulse 72   Ht 182.9 cm (72.01\")   Wt 118 kg (261 lb)   SpO2 98%   BMI 35.39 kg/m²      Physical Exam    Vital signs reviewed.   General: No acute distress.  Eyes: conjunctiva clear; pupils equally round and reactive  ENT: external ears and nose atraumatic; oropharynx clear  CV: no peripheral edema, 2+ distal pulses  Resp: normal respiratory effort, no use of accessory muscles  Skin: no rashes or wounds; normal turgor  Psych: mood and affect appropriate; recent and remote memory intact  Neuro: sensation to light touch intact    MSK Exam:  Right Ankle Exam   Swelling: none    Tenderness   Right ankle tenderness location: medial Achilles insertion.        Range of Motion   The patient has normal right ankle ROM.    Muscle Strength   The patient has normal right ankle strength.    Tests   Anterior drawer: negative    Comments:  Neg Zimmerman squeeze      Left Ankle Exam   Left ankle exam is " normal.          Right Ankle X-Ray  Indication: Pain  Views: AP, Lateral, Mortise    Findings:  No fracture  No bony lesion  Soft tissues normal  Normal joint spaces    No prior studies available for comparison.    Diagnoses and all orders for this visit:    Acute right ankle pain  -     XR Ankle 3+ View Right  -     Ambulatory Referral to Physical Therapy Evaluate and treat    Right Achilles tendinitis  -     Ambulatory Referral to Physical Therapy Evaluate and treat    Other orders  -     nitroglycerin (NITRODUR) 0.1 MG/HR patch; Use as directed      Insertional Achilles tendinitis. No tear appreciated on exam. nml XR d/w pt. Nitropatch daily PRN. Refer to PT.    EMR Dragon/Transcription disclaimer:    Much of this encounter note is an electronic transcription/translation of spoken language to printed text.  The electronic translation of spoken language may permit erroneous, or at times, nonsensical words or phrases to be inadvertently transcribed.  Although I have reviewed the note for such errors some may still exist.

## 2018-08-03 ENCOUNTER — TREATMENT (OUTPATIENT)
Dept: PHYSICAL THERAPY | Facility: CLINIC | Age: 43
End: 2018-08-03

## 2018-08-03 DIAGNOSIS — S90.31XD CONTUSION OF RIGHT HEEL, SUBSEQUENT ENCOUNTER: ICD-10-CM

## 2018-08-03 DIAGNOSIS — M79.671 PAIN OF RIGHT HEEL: Primary | ICD-10-CM

## 2018-08-03 PROCEDURE — 97110 THERAPEUTIC EXERCISES: CPT | Performed by: PHYSICAL THERAPIST

## 2018-08-03 PROCEDURE — 97161 PT EVAL LOW COMPLEX 20 MIN: CPT | Performed by: PHYSICAL THERAPIST

## 2018-08-03 NOTE — PROGRESS NOTES
Physical Therapy Initial Evaluation and Plan of Care    Patient: Austin Orta   : 1975  Diagnosis/ICD-10 Code:  Pain of right heel [M79.671]  Referring practitioner: Darryl Ramírez *  Past medical Hx reviewed: 8/3/2018  Subjective Evaluation    History of Present Illness  Date of onset: 2018  Mechanism of injury: I play softball in a league and I was running to third base and stepped on the base awkwardly.  The league is pretty competitive and first time playing in this league.  After the injury, I iced then I flew to [place] for a recreation event.  When I came back I took another week off from ball but then I tried to go back.  I did okay but after I got home I felt more pain and have laid off the sports since.    Currently, I have to walk a little bit on my toes going down stairs, and if I walk too long I start feeling more pain in the heel and start walking more on my toes that eventually begin to hurt.        Patient Occupation: Office- .    Precautions and Work Restrictions: None Quality of life: excellent    Pain  Current pain ratin  At best pain ratin  At worst pain rating: 3  Location: R posterior/medial achilles.   Quality: dull ache and sharp (Going down hill. )  Relieving factors: ice, medications, rest and support (15 mg meloxicam 1x /night.)  Aggravating factors: stairs, squatting, repetitive movement, lifting and ambulation (Going down hill.  )  Progression: no change    Social Support  Lives in: one-story house (1st floor laundry and can avoid stairs. )  Lives with: adult children and spouse    Diagnostic Tests  X-ray: normal    Treatments  Previous treatment: medication  Patient Goals  Patient goals for therapy: decreased edema, decreased pain, improved balance, increased motion, return to sport/leisure activities, increased strength and independence with ADLs/IADLs       Objective       Strength/Myotome Testing     Right Ankle/Foot   Dorsiflexion:  5  Plantar flexion: 4+ (with L UE on counter, SLS x 4 )  Inversion: 4+ (Slight discomfort with combined PF. )  Eversion: 5    Tests     Right Ankle/Foot   Positive for calcaneal squeeze.   Negative for anterior drawer, eversion talar tilt, Homans' sign, posterior drawer, syndesmosis squeeze and Zimmerman.     Additional Tests Details  Heel tap test: mild discomfort.       Ambulation     Observational Gait   Gait: antalgic   Decreased walking speed and right stance time.     Additional Observational Gait Details  significant R out toeing noted.  When corrected after cues, increased pain reported to heel.      Functional Assessment   Squat No pain.     Single Leg Stance   Left: 30 (Minimal unsteadiness compared to R. ) seconds  Right: 27 (Reports feeling of being unstable) seconds    Assessment & Plan     Assessment  Impairments: abnormal gait, abnormal or restricted ROM, impaired balance, impaired physical strength, lacks appropriate home exercise program, pain with function and weight-bearing intolerance  Assessment details: Pt presents to PT with signs and symptoms consistent with R calcaneal contusion vs achilles tendonitis.  Due to mechanism of injury, testing results and response to treatment he seems to need more time for the injury to heal.  Pt would benefit from skilled PT intervention to address the deficits described.    Prognosis: good  Prognosis details:   Short term goals: 1-2 visits   1.Pt to be instructed in initial HEP.  2. c/o pain to 2/10 for ease with ambulation on TM/level surface up to 15 min without increase in s/s. sustained over 2-3 days.    Long term goals: 2-3 visits  1. Pt to demonstrate independencewith advanced HEP.  2. Decrease c/o pain to 1/10 for ease with functional activity mentioned above>30 min.    3. LEFS > 75/80  (% perceived normal ability)  4. No palapable tenderness to Heel   5.  SLS balance on uneven surface for up to 30 sec. For increased safety and endurance for walking on  outdoor uneven surfaces.   6.  Strength to 5/5 all planes of ankle as needed for prolonged ambulation as needed for home/self-care.    7. Pt will have negative finding for heel tap test, squeeze test on R.     Functional Limitations: walking, uncomfortable because of pain, standing and unable to perform repetitive tasks  Plan  Therapy options: will be seen for skilled physical therapy services  Planned modality interventions: ultrasound, TENS and cryotherapy  Planned therapy interventions: joint mobilization, home exercise program, gait training, flexibility, balance/weight-bearing training, manual therapy, neuromuscular re-education, soft tissue mobilization, strengthening, stretching and therapeutic activities  Frequency: 1x week  Duration in visits: 3  Treatment plan discussed with: patient    Manual Therapy:    -     mins  84910;  Therapeutic Exercise:    10     mins  51096;     Neuromuscular Amrik:    -    mins  74406;    Therapeutic Activity:     -     mins  91277;     Gait Training:      -     mins  92034;     Ultrasound:     -     mins  11656;    Electrical Stimulation:    -     mins  84470 ( );  Dry Needling     -     mins self-pay    Timed Treatment:   10   mins   Total Treatment:     55   mins    PT SIGNATURE: MADISON Thomason License #: [id number]    DATE TREATMENT INITIATED: 8/3/2018    Initial Certification  Certification Period: 11/1/2018  I certify that the therapy services are furnished while this patient is under my care.  The services outlined above are required by this patient, and will be reviewed every 90 days.     PHYSICIAN: Darryl Ramírez Jr., DO      DATE:     Please sign and return via fax to [phone].. Thank you, [facility].

## 2018-08-08 RX ORDER — OMEPRAZOLE 20 MG/1
20 CAPSULE, DELAYED RELEASE ORAL DAILY
Qty: 90 CAPSULE | Refills: 2 | Status: SHIPPED | OUTPATIENT
Start: 2018-08-08 | End: 2019-04-30 | Stop reason: SDUPTHER

## 2018-08-08 RX ORDER — MELOXICAM 15 MG/1
15 TABLET ORAL DAILY
Qty: 90 TABLET | Refills: 0 | Status: SHIPPED | OUTPATIENT
Start: 2018-08-08 | End: 2018-11-07 | Stop reason: SDUPTHER

## 2018-10-26 ENCOUNTER — CLINICAL SUPPORT (OUTPATIENT)
Dept: FAMILY MEDICINE CLINIC | Facility: CLINIC | Age: 43
End: 2018-10-26

## 2018-10-26 DIAGNOSIS — Z23 NEED FOR HEPATITIS VACCINATION: Primary | ICD-10-CM

## 2018-10-26 PROCEDURE — 90471 IMMUNIZATION ADMIN: CPT | Performed by: NURSE PRACTITIONER

## 2018-10-26 PROCEDURE — 90632 HEPA VACCINE ADULT IM: CPT | Performed by: NURSE PRACTITIONER

## 2018-11-07 RX ORDER — MELOXICAM 15 MG/1
15 TABLET ORAL DAILY
Qty: 90 TABLET | Refills: 0 | Status: SHIPPED | OUTPATIENT
Start: 2018-11-07 | End: 2018-12-04

## 2018-11-08 ENCOUNTER — OFFICE VISIT (OUTPATIENT)
Dept: FAMILY MEDICINE CLINIC | Facility: CLINIC | Age: 43
End: 2018-11-08

## 2018-11-08 VITALS
SYSTOLIC BLOOD PRESSURE: 132 MMHG | OXYGEN SATURATION: 98 % | DIASTOLIC BLOOD PRESSURE: 90 MMHG | TEMPERATURE: 99 F | HEIGHT: 72 IN | HEART RATE: 99 BPM | BODY MASS INDEX: 35.89 KG/M2 | WEIGHT: 265 LBS

## 2018-11-08 DIAGNOSIS — B34.9 ACUTE VIRAL SYNDROME: Primary | ICD-10-CM

## 2018-11-08 PROCEDURE — 99213 OFFICE O/P EST LOW 20 MIN: CPT | Performed by: NURSE PRACTITIONER

## 2018-11-08 NOTE — PATIENT INSTRUCTIONS
Discharge instructions  Push fluids  Plenty rest  Treat fever body aches ibuprofen 800 mg 3 times a day    Chest pain shortness of breath severe headache abdominal pain lethargy uncontrolled vomiting worsening symptoms emergency room    Return to work Monday

## 2018-11-08 NOTE — PROGRESS NOTES
Subjective   Austin Orta is a 43 y.o. male.     Chills body aches cough mild   Feels feverish  No chest pain no shortness of breath no abdominal pain  No bowel or bladder change  Occasional cough a little scratchy throat and some postnasal drip for several days  Nothing makes better or worse although had a couple ibuprofen last night  And this morning  Helped some      Headache    Associated symptoms include a sore throat.   Sore Throat    Associated symptoms include headaches. Pertinent negatives include no shortness of breath.        The following portions of the patient's history were reviewed and updated as appropriate: allergies, current medications, past medical history, past social history, past surgical history and problem list.    Review of Systems   Constitutional: Positive for chills. Negative for fatigue.   HENT: Positive for sore throat.    Eyes: Negative.    Respiratory: Negative for shortness of breath.    Cardiovascular: Negative for chest pain.   Genitourinary: Negative.    Musculoskeletal:        Body aches   Neurological: Negative for headache.       Objective   Physical Exam   Constitutional: He is oriented to person, place, and time. He appears well-developed and well-nourished. No distress.   Nontoxic no distress   HENT:   Head: Normocephalic and atraumatic.   Nose: Nose normal.   Mouth/Throat: Oropharynx is clear and moist.   Eyes: Pupils are equal, round, and reactive to light. Conjunctivae are normal. No scleral icterus.   Neck: Neck supple. No JVD present. No thyromegaly present.   Cardiovascular: Normal rate, regular rhythm and normal heart sounds.  Exam reveals no gallop and no friction rub.    No murmur heard.  Pulmonary/Chest: Effort normal and breath sounds normal. No respiratory distress. He has no wheezes. He has no rales.   Abdominal: Soft. Bowel sounds are normal. He exhibits no distension and no mass. There is no tenderness. There is no guarding. No hernia.   Musculoskeletal:  He exhibits no edema or tenderness.   Lymphadenopathy:     He has no cervical adenopathy.   Neurological: He is alert and oriented to person, place, and time. He has normal reflexes.   Skin: Skin is warm and dry. No rash noted. He is not diaphoretic. No erythema.   Psychiatric: He has a normal mood and affect. His behavior is normal. Judgment and thought content normal.   Vitals reviewed.        Assessment/Plan   Austin was seen today for headache and sore throat.    Diagnoses and all orders for this visit:    Acute viral syndrome                  Discharge instructions  Push fluids  Plenty rest  Treat fever body aches ibuprofen 800 mg 3 times a day    Chest pain shortness of breath severe headache abdominal pain lethargy uncontrolled vomiting worsening symptoms emergency room    Return to work Monday

## 2018-12-04 ENCOUNTER — OFFICE VISIT (OUTPATIENT)
Dept: FAMILY MEDICINE CLINIC | Facility: CLINIC | Age: 43
End: 2018-12-04

## 2018-12-04 VITALS
HEART RATE: 89 BPM | WEIGHT: 261 LBS | BODY MASS INDEX: 35.35 KG/M2 | SYSTOLIC BLOOD PRESSURE: 128 MMHG | OXYGEN SATURATION: 96 % | HEIGHT: 72 IN | DIASTOLIC BLOOD PRESSURE: 70 MMHG

## 2018-12-04 DIAGNOSIS — J20.9 ACUTE BRONCHITIS, UNSPECIFIED ORGANISM: Primary | ICD-10-CM

## 2018-12-04 DIAGNOSIS — M25.511 ACUTE PAIN OF RIGHT SHOULDER: ICD-10-CM

## 2018-12-04 PROCEDURE — 99214 OFFICE O/P EST MOD 30 MIN: CPT | Performed by: NURSE PRACTITIONER

## 2018-12-04 RX ORDER — NAPROXEN 500 MG/1
500 TABLET ORAL 2 TIMES DAILY WITH MEALS
Qty: 60 TABLET | Refills: 5 | Status: SHIPPED | OUTPATIENT
Start: 2018-12-04 | End: 2018-12-12 | Stop reason: HOSPADM

## 2018-12-04 RX ORDER — METHYLPREDNISOLONE 4 MG/1
TABLET ORAL
Qty: 21 TABLET | Refills: 0 | Status: SHIPPED | OUTPATIENT
Start: 2018-12-04 | End: 2018-12-12 | Stop reason: HOSPADM

## 2018-12-04 RX ORDER — ZOLPIDEM TARTRATE 5 MG/1
2.5 TABLET ORAL NIGHTLY PRN
COMMUNITY
End: 2019-03-13

## 2018-12-04 NOTE — PATIENT INSTRUCTIONS
Push fluids  Plenty rest  Saline nasal spray as needed  Over-the-counter cough syrup as needed    If chest pain shortness of breath high fever emergency room  Urgent recheck any late on set wheezing shortness of breath or fever    Shoulder exercises gradual strengthening  Consider physical therapy  If not improving in 6 weeks call for physical therapy referral  If not improving in 6-12 weeks consider orthopedic referral Dr. Pacheco      Shoulder Exercises  Ask your health care provider which exercises are safe for you. Do exercises exactly as told by your health care provider and adjust them as directed. It is normal to feel mild stretching, pulling, tightness, or discomfort as you do these exercises, but you should stop right away if you feel sudden pain or your pain gets worse. Do not begin these exercises until told by your health care provider.  RANGE OF MOTION EXERCISES  These exercises warm up your muscles and joints and improve the movement and flexibility of your shoulder. These exercises also help to relieve pain, numbness, and tingling. These exercises involve stretching your injured shoulder directly.  Exercise A: Pendulum    1. Stand near a wall or a surface that you can hold onto for balance.  2. Bend at the waist and let your left / right arm hang straight down. Use your other arm to support you. Keep your back straight and do not lock your knees.  3. Relax your left / right arm and shoulder muscles, and move your hips and your trunk so your left / right arm swings freely. Your arm should swing because of the motion of your body, not because you are using your arm or shoulder muscles.  4. Keep moving your body so your arm swings in the following directions, as told by your health care provider:  ? Side to side.  ? Forward and backward.  ? In clockwise and counterclockwise circles.  5. Continue each motion for __________ seconds, or for as long as told by your health care provider.  6. Slowly return to  the starting position.  Repeat __________ times. Complete this exercise __________ times a day.  Exercise B: Flexion, Standing    1. Stand and hold a broomstick, a cane, or a similar object. Place your hands a little more than shoulder-width apart on the object. Your left / right hand should be palm-up, and your other hand should be palm-down.  2. Keep your elbow straight and keep your shoulder muscles relaxed. Push the stick down with your healthy arm to raise your left / right arm in front of your body, and then over your head until you feel a stretch in your shoulder.  ? Avoid shrugging your shoulder while you raise your arm. Keep your shoulder blade tucked down toward the middle of your back.  3. Hold for __________ seconds.  4. Slowly return to the starting position.  Repeat __________ times. Complete this exercise __________ times a day.  Exercise C: Abduction, Standing  1. Stand and hold a broomstick, a cane, or a similar object. Place your hands a little more than shoulder-width apart on the object. Your left / right hand should be palm-up, and your other hand should be palm-down.  2. While keeping your elbow straight and your shoulder muscles relaxed, push the stick across your body toward your left / right side. Raise your left / right arm to the side of your body and then over your head until you feel a stretch in your shoulder.  ? Do not raise your arm above shoulder height, unless your health care provider tells you to do that.  ? Avoid shrugging your shoulder while you raise your arm. Keep your shoulder blade tucked down toward the middle of your back.  3. Hold for __________ seconds.  4. Slowly return to the starting position.  Repeat __________ times. Complete this exercise __________ times a day.  Exercise D: Internal Rotation    1. Place your left / right hand behind your back, palm-up.  2. Use your other hand to dangle an exercise band, a towel, or a similar object over your shoulder. Grasp the  band with your left / right hand so you are holding onto both ends.  3. Gently pull up on the band until you feel a stretch in the front of your left / right shoulder.  ? Avoid shrugging your shoulder while you raise your arm. Keep your shoulder blade tucked down toward the middle of your back.  4. Hold for __________ seconds.  5. Release the stretch by letting go of the band and lowering your hands.  Repeat __________ times. Complete this exercise __________ times a day.  STRETCHING EXERCISES  These exercises warm up your muscles and joints and improve the movement and flexibility of your shoulder. These exercises also help to relieve pain, numbness, and tingling. These exercises are done using your healthy shoulder to help stretch the muscles of your injured shoulder.  Exercise E: Corner Stretch (External Rotation and Abduction)    1.  a doorway with one of your feet slightly in front of the other. This is called a staggered stance. If you cannot reach your forearms to the door frame, stand facing a corner of a room.  2. Choose one of the following positions as told by your health care provider:  ? Place your hands and forearms on the door frame above your head.  ? Place your hands and forearms on the door frame at the height of your head.  ? Place your hands on the door frame at the height of your elbows.  3. Slowly move your weight onto your front foot until you feel a stretch across your chest and in the front of your shoulders. Keep your head and chest upright and keep your abdominal muscles tight.  4. Hold for __________ seconds.  5. To release the stretch, shift your weight to your back foot.  Repeat __________ times. Complete this stretch __________ times a day.  Exercise F: Extension, Standing  1. Stand and hold a broomstick, a cane, or a similar object behind your back.  ? Your hands should be a little wider than shoulder-width apart.  ? Your palms should face away from your back.  2. Keeping your  elbows straight and keeping your shoulder muscles relaxed, move the stick away from your body until you feel a stretch in your shoulder.  ? Avoid shrugging your shoulders while you move the stick. Keep your shoulder blade tucked down toward the middle of your back.  3. Hold for __________ seconds.  4. Slowly return to the starting position.  Repeat __________ times. Complete this exercise __________ times a day.  STRENGTHENING EXERCISES  These exercises build strength and endurance in your shoulder. Endurance is the ability to use your muscles for a long time, even after they get tired.  Exercise G: External Rotation    1. Sit in a stable chair without armrests.  2. Secure an exercise band at elbow height on your left / right side.  3. Place a soft object, such as a folded towel or a small pillow, between your left / right upper arm and your body to move your elbow a few inches away (about 10 cm) from your side.  4. Hold the end of the band so it is tight and there is no slack.  5. Keeping your elbow pressed against the soft object, move your left / right forearm out, away from your abdomen. Keep your body steady so only your forearm moves.  6. Hold for __________ seconds.  7. Slowly return to the starting position.  Repeat __________ times. Complete this exercise __________ times a day.  Exercise H: Shoulder Abduction    1. Sit in a stable chair without armrests, or stand.  2. Hold a __________ weight in your left / right hand, or hold an exercise band with both hands.  3. Start with your arms straight down and your left / right palm facing in, toward your body.  4. Slowly lift your left / right hand out to your side. Do not lift your hand above shoulder height unless your health care provider tells you that this is safe.  ? Keep your arms straight.  ? Avoid shrugging your shoulder while you do this movement. Keep your shoulder blade tucked down toward the middle of your back.  5. Hold for __________  "seconds.  6. Slowly lower your arm, and return to the starting position.  Repeat __________ times. Complete this exercise __________ times a day.  Exercise I: Shoulder Extension  1. Sit in a stable chair without armrests, or stand.  2. Secure an exercise band to a stable object in front of you where it is at shoulder height.  3. Hold one end of the exercise band in each hand. Your palms should face each other.  4. Straighten your elbows and lift your hands up to shoulder height.  5. Step back, away from the secured end of the exercise band, until the band is tight and there is no slack.  6. Squeeze your shoulder blades together as you pull your hands down to the sides of your thighs. Stop when your hands are straight down by your sides. Do not let your hands go behind your body.  7. Hold for __________ seconds.  8. Slowly return to the starting position.  Repeat __________ times. Complete this exercise __________ times a day.  Exercise J: Standing Shoulder Row  1. Sit in a stable chair without armrests, or stand.  2. Secure an exercise band to a stable object in front of you so it is at waist height.  3. Hold one end of the exercise band in each hand. Your palms should be in a thumbs-up position.  4. Bend each of your elbows to an \"L\" shape (about 90 degrees) and keep your upper arms at your sides.  5. Step back until the band is tight and there is no slack.  6. Slowly pull your elbows back behind you.  7. Hold for __________ seconds.  8. Slowly return to the starting position.  Repeat __________ times. Complete this exercise __________ times a day.  Exercise K: Shoulder Press-Ups    1. Sit in a stable chair that has armrests. Sit upright, with your feet flat on the floor.  2. Put your hands on the armrests so your elbows are bent and your fingers are pointing forward. Your hands should be about even with the sides of your body.  3. Push down on the armrests and use your arms to lift yourself off of the chair. " Straighten your elbows and lift yourself up as much as you comfortably can.  ? Move your shoulder blades down, and avoid letting your shoulders move up toward your ears.  ? Keep your feet on the ground. As you get stronger, your feet should support less of your body weight as you lift yourself up.  4. Hold for __________ seconds.  5. Slowly lower yourself back into the chair.  Repeat __________ times. Complete this exercise __________ times a day.  Exercise L: Wall Push-Ups    1. Stand so you are facing a stable wall. Your feet should be about one arm-length away from the wall.  2. Lean forward and place your palms on the wall at shoulder height.  3. Keep your feet flat on the floor as you bend your elbows and lean forward toward the wall.  4. Hold for __________ seconds.  5. Straighten your elbows to push yourself back to the starting position.  Repeat __________ times. Complete this exercise __________ times a day.  This information is not intended to replace advice given to you by your health care provider. Make sure you discuss any questions you have with your health care provider.  Document Released: 11/01/2006 Document Revised: 09/11/2017 Document Reviewed: 08/28/2016  [employer] Interactive Patient Education © 2018 [employer]

## 2018-12-05 DIAGNOSIS — F32.A DEPRESSION, UNSPECIFIED DEPRESSION TYPE: ICD-10-CM

## 2018-12-05 RX ORDER — LEVOCETIRIZINE DIHYDROCHLORIDE 5 MG/1
5 TABLET, FILM COATED ORAL EVERY EVENING
Qty: 90 TABLET | Refills: 2 | Status: SHIPPED | OUTPATIENT
Start: 2018-12-05 | End: 2019-07-29 | Stop reason: SDUPTHER

## 2018-12-05 RX ORDER — ESCITALOPRAM OXALATE 10 MG/1
10 TABLET ORAL DAILY
Qty: 90 TABLET | Refills: 2 | Status: SHIPPED | OUTPATIENT
Start: 2018-12-05 | End: 2019-07-29 | Stop reason: SDUPTHER

## 2018-12-06 NOTE — PROGRESS NOTES
Subjective   Austin Orta is a 43 y.o. male.     Sinus drainage Congestion scratchy throat slight cough since yesterday  No chest pain shortness of breath fever or chills  Wife similar symptoms several days ago  Nothing better or worse  Cough increasing    Chronic right shoulder pain when you play softball no known injury  Rotator cuff previously left improved therapy    Questions about what he should do with his shoulder mild discomfort sometimes moderate        Sinusitis   Associated symptoms include coughing, sinus pressure, sneezing and a sore throat. Pertinent negatives include no chills or shortness of breath.   Sore Throat    Associated symptoms include coughing. Pertinent negatives include no shortness of breath.        The following portions of the patient's history were reviewed and updated as appropriate: allergies, current medications, past family history, past medical history, past social history, past surgical history and problem list.    Review of Systems   Constitutional: Negative for chills and fever.   HENT: Positive for postnasal drip, rhinorrhea, sinus pressure, sneezing and sore throat.    Respiratory: Positive for cough. Negative for shortness of breath.    Musculoskeletal: Positive for arthralgias.   All other systems reviewed and are negative.      Objective   Physical Exam   Constitutional: He appears well-developed and well-nourished.   HENT:   Head: Normocephalic and atraumatic.   turbinates congested   Eyes: Conjunctivae are normal. Pupils are equal, round, and reactive to light.   Neck: Neck supple.   Cardiovascular: Normal rate, regular rhythm and normal heart sounds.   Pulmonary/Chest: Effort normal.   Musculoskeletal: Normal range of motion. He exhibits tenderness.   Normal range of motion upper extremities neurovascularly intact  Internal external rotation  Pain elicited reaching backward  As well as external rotation  No drifting with arms behind back  Mild anterior tinnitus  No  deformity  No AC joint tenderness or deformity       Lymphadenopathy:     He has no cervical adenopathy.   Skin: Skin is warm and dry. No rash noted. No erythema.   Psychiatric: He has a normal mood and affect. His behavior is normal. Judgment and thought content normal.   Vitals reviewed.        Assessment/Plan   Austin was seen today for sinusitis and sore throat.    Diagnoses and all orders for this visit:    Acute bronchitis, unspecified organism    Acute pain of right shoulder    Other orders  -     MethylPREDNISolone (MEDROL, RAFA,) 4 MG tablet; Take as directed on package instructions.  -     naproxen (NAPROSYN) 500 MG tablet; Take 1 tablet by mouth 2 (Two) Times a Day With Meals. As needed for pain          Push fluids  Plenty rest  Saline nasal spray as needed  Over-the-counter cough syrup as needed    If chest pain shortness of breath high fever emergency room  Urgent recheck any late on set wheezing shortness of breath or fever    Shoulder exercises gradual strengthening  Consider physical therapy  If not improving in 6 weeks call for physical therapy referral  If not improving in 6-12 weeks consider orthopedic referral Dr. Pacheco

## 2018-12-09 ENCOUNTER — HOSPITAL ENCOUNTER (INPATIENT)
Facility: HOSPITAL | Age: 43
LOS: 3 days | Discharge: HOME | End: 2018-12-12
Attending: EMERGENCY MEDICINE | Admitting: INTERNAL MEDICINE

## 2018-12-09 ENCOUNTER — APPOINTMENT (OUTPATIENT)
Dept: MRI IMAGING | Facility: HOSPITAL | Age: 43
End: 2018-12-09

## 2018-12-09 ENCOUNTER — APPOINTMENT (OUTPATIENT)
Dept: CT IMAGING | Facility: HOSPITAL | Age: 43
End: 2018-12-09

## 2018-12-09 DIAGNOSIS — R93.0 ABNORMAL CT SCAN OF HEAD: ICD-10-CM

## 2018-12-09 DIAGNOSIS — I48.91 NEW ONSET ATRIAL FIBRILLATION (HCC): ICD-10-CM

## 2018-12-09 DIAGNOSIS — R56.9 NEW ONSET SEIZURE (HCC): Primary | ICD-10-CM

## 2018-12-09 PROBLEM — G93.89 FRONTAL MASS OF BRAIN: Status: ACTIVE | Noted: 2018-12-09

## 2018-12-09 LAB
ALBUMIN SERPL-MCNC: 3.7 G/DL (ref 3.5–5.2)
ALBUMIN/GLOB SERPL: 1.2 G/DL
ALP SERPL-CCNC: 103 U/L (ref 39–117)
ALT SERPL W P-5'-P-CCNC: 30 U/L (ref 1–41)
AMPHET+METHAMPHET UR QL: NEGATIVE
ANION GAP SERPL CALCULATED.3IONS-SCNC: 12.6 MMOL/L
AST SERPL-CCNC: 24 U/L (ref 1–40)
BACTERIA UR QL AUTO: NORMAL /HPF
BARBITURATES UR QL SCN: NEGATIVE
BENZODIAZ UR QL SCN: NEGATIVE
BILIRUB SERPL-MCNC: 0.3 MG/DL (ref 0.1–1.2)
BILIRUB UR QL STRIP: NEGATIVE
BUN BLD-MCNC: 20 MG/DL (ref 6–20)
BUN/CREAT SERPL: 22 (ref 7–25)
CALCIUM SPEC-SCNC: 8.5 MG/DL (ref 8.6–10.5)
CANNABINOIDS SERPL QL: NEGATIVE
CHLORIDE SERPL-SCNC: 102 MMOL/L (ref 98–107)
CLARITY UR: CLEAR
CO2 SERPL-SCNC: 25.4 MMOL/L (ref 22–29)
COCAINE UR QL: NEGATIVE
COLOR UR: YELLOW
CREAT BLD-MCNC: 0.91 MG/DL (ref 0.76–1.27)
DEPRECATED RDW RBC AUTO: 38.8 FL (ref 37–54)
ERYTHROCYTE [DISTWIDTH] IN BLOOD BY AUTOMATED COUNT: 12.9 % (ref 11.5–14.5)
GFR SERPL CREATININE-BSD FRML MDRD: 91 ML/MIN/1.73
GLOBULIN UR ELPH-MCNC: 3.2 GM/DL
GLUCOSE BLD-MCNC: 116 MG/DL (ref 65–99)
GLUCOSE BLDC GLUCOMTR-MCNC: 110 MG/DL (ref 70–130)
GLUCOSE UR STRIP-MCNC: NEGATIVE MG/DL
HCT VFR BLD AUTO: 44.1 % (ref 40.4–52.2)
HGB BLD-MCNC: 15.4 G/DL (ref 13.7–17.6)
HGB UR QL STRIP.AUTO: NEGATIVE
HYALINE CASTS UR QL AUTO: NORMAL /LPF
INR PPP: 0.97 (ref 0.9–1.1)
KETONES UR QL STRIP: ABNORMAL
LARGE PLATELETS: ABNORMAL
LEUKOCYTE ESTERASE UR QL STRIP.AUTO: NEGATIVE
LYMPHOCYTES # BLD MANUAL: 4.84 10*3/MM3 (ref 0.9–4.8)
LYMPHOCYTES NFR BLD MANUAL: 35 % (ref 19.6–45.3)
LYMPHOCYTES NFR BLD MANUAL: 4 % (ref 5–12)
MAGNESIUM SERPL-MCNC: 2.3 MG/DL (ref 1.6–2.6)
MCH RBC QN AUTO: 29.2 PG (ref 27–32.7)
MCHC RBC AUTO-ENTMCNC: 34.9 G/DL (ref 32.6–36.4)
MCV RBC AUTO: 83.7 FL (ref 79.8–96.2)
METAMYELOCYTES NFR BLD MANUAL: 2 % (ref 0–0)
METHADONE UR QL SCN: NEGATIVE
MONOCYTES # BLD AUTO: 0.55 10*3/MM3 (ref 0.2–1.2)
MYELOCYTES NFR BLD MANUAL: 3 % (ref 0–0)
NEUTROPHILS # BLD AUTO: 7.34 10*3/MM3 (ref 1.9–8.1)
NEUTROPHILS NFR BLD MANUAL: 53 % (ref 42.7–76)
NITRITE UR QL STRIP: NEGATIVE
OPIATES UR QL: NEGATIVE
OXYCODONE UR QL SCN: NEGATIVE
PH UR STRIP.AUTO: <=5 [PH] (ref 5–8)
PLATELET # BLD AUTO: 332 10*3/MM3 (ref 140–500)
PMV BLD AUTO: 9 FL (ref 6–12)
POTASSIUM BLD-SCNC: 4.4 MMOL/L (ref 3.5–5.2)
PROT SERPL-MCNC: 6.9 G/DL (ref 6–8.5)
PROT UR QL STRIP: ABNORMAL
PROTHROMBIN TIME: 12.7 SECONDS (ref 11.7–14.2)
RBC # BLD AUTO: 5.27 10*6/MM3 (ref 4.6–6)
RBC # UR: NORMAL /HPF
RBC MORPH BLD: NORMAL
REF LAB TEST METHOD: NORMAL
SCAN SLIDE: NORMAL
SMUDGE CELLS BLD QL SMEAR: ABNORMAL
SODIUM BLD-SCNC: 140 MMOL/L (ref 136–145)
SP GR UR STRIP: 1.03 (ref 1–1.03)
SQUAMOUS #/AREA URNS HPF: NORMAL /HPF
TROPONIN T SERPL-MCNC: <0.01 NG/ML (ref 0–0.03)
TSH SERPL DL<=0.05 MIU/L-ACNC: 2.27 MIU/ML (ref 0.27–4.2)
UROBILINOGEN UR QL STRIP: ABNORMAL
VARIANT LYMPHS NFR BLD MANUAL: 3 % (ref 0–5)
WBC NRBC COR # BLD: 13.84 10*3/MM3 (ref 4.5–10.7)
WBC UR QL AUTO: NORMAL /HPF

## 2018-12-09 PROCEDURE — 83735 ASSAY OF MAGNESIUM: CPT | Performed by: INTERNAL MEDICINE

## 2018-12-09 PROCEDURE — 84484 ASSAY OF TROPONIN QUANT: CPT | Performed by: EMERGENCY MEDICINE

## 2018-12-09 PROCEDURE — 25010000002 LEVETIRACETAM IN NACL 0.82% 500 MG/100ML SOLUTION: Performed by: INTERNAL MEDICINE

## 2018-12-09 PROCEDURE — 81001 URINALYSIS AUTO W/SCOPE: CPT | Performed by: EMERGENCY MEDICINE

## 2018-12-09 PROCEDURE — 99255 IP/OBS CONSLTJ NEW/EST HI 80: CPT | Performed by: PSYCHIATRY & NEUROLOGY

## 2018-12-09 PROCEDURE — 99232 SBSQ HOSP IP/OBS MODERATE 35: CPT | Performed by: INTERNAL MEDICINE

## 2018-12-09 PROCEDURE — 85007 BL SMEAR W/DIFF WBC COUNT: CPT | Performed by: EMERGENCY MEDICINE

## 2018-12-09 PROCEDURE — 93005 ELECTROCARDIOGRAM TRACING: CPT | Performed by: EMERGENCY MEDICINE

## 2018-12-09 PROCEDURE — 80307 DRUG TEST PRSMV CHEM ANLYZR: CPT | Performed by: EMERGENCY MEDICINE

## 2018-12-09 PROCEDURE — 99255 IP/OBS CONSLTJ NEW/EST HI 80: CPT | Performed by: NEUROLOGICAL SURGERY

## 2018-12-09 PROCEDURE — 25010000002 ONDANSETRON PER 1 MG: Performed by: HOSPITALIST

## 2018-12-09 PROCEDURE — 80053 COMPREHEN METABOLIC PANEL: CPT | Performed by: EMERGENCY MEDICINE

## 2018-12-09 PROCEDURE — A9577 INJ MULTIHANCE: HCPCS | Performed by: INTERNAL MEDICINE

## 2018-12-09 PROCEDURE — 93010 ELECTROCARDIOGRAM REPORT: CPT | Performed by: INTERNAL MEDICINE

## 2018-12-09 PROCEDURE — 70553 MRI BRAIN STEM W/O & W/DYE: CPT

## 2018-12-09 PROCEDURE — 85025 COMPLETE CBC W/AUTO DIFF WBC: CPT | Performed by: EMERGENCY MEDICINE

## 2018-12-09 PROCEDURE — 85610 PROTHROMBIN TIME: CPT | Performed by: EMERGENCY MEDICINE

## 2018-12-09 PROCEDURE — 0 GADOBENATE DIMEGLUMINE 529 MG/ML SOLUTION: Performed by: INTERNAL MEDICINE

## 2018-12-09 PROCEDURE — 70450 CT HEAD/BRAIN W/O DYE: CPT

## 2018-12-09 PROCEDURE — 63710000001 DIPHENHYDRAMINE PER 50 MG: Performed by: INTERNAL MEDICINE

## 2018-12-09 PROCEDURE — 99285 EMERGENCY DEPT VISIT HI MDM: CPT

## 2018-12-09 PROCEDURE — 93005 ELECTROCARDIOGRAM TRACING: CPT | Performed by: INTERNAL MEDICINE

## 2018-12-09 PROCEDURE — 25010000003 LEVETIRACETAM IN NACL 0.75% 1000 MG/100ML SOLUTION: Performed by: EMERGENCY MEDICINE

## 2018-12-09 PROCEDURE — 82962 GLUCOSE BLOOD TEST: CPT

## 2018-12-09 PROCEDURE — 84443 ASSAY THYROID STIM HORMONE: CPT | Performed by: INTERNAL MEDICINE

## 2018-12-09 RX ORDER — ONDANSETRON 2 MG/ML
4 INJECTION INTRAMUSCULAR; INTRAVENOUS EVERY 6 HOURS PRN
Status: DISCONTINUED | OUTPATIENT
Start: 2018-12-09 | End: 2018-12-12 | Stop reason: HOSPADM

## 2018-12-09 RX ORDER — LEVETIRACETAM 500 MG/1
500 TABLET ORAL EVERY 6 HOURS
Status: DISCONTINUED | OUTPATIENT
Start: 2018-12-10 | End: 2018-12-09

## 2018-12-09 RX ORDER — SODIUM CHLORIDE 0.9 % (FLUSH) 0.9 %
3-10 SYRINGE (ML) INJECTION AS NEEDED
Status: DISCONTINUED | OUTPATIENT
Start: 2018-12-09 | End: 2018-12-12 | Stop reason: HOSPADM

## 2018-12-09 RX ORDER — ESCITALOPRAM OXALATE 10 MG/1
10 TABLET ORAL DAILY
Status: DISCONTINUED | OUTPATIENT
Start: 2018-12-09 | End: 2018-12-12 | Stop reason: HOSPADM

## 2018-12-09 RX ORDER — PANTOPRAZOLE SODIUM 40 MG/1
40 TABLET, DELAYED RELEASE ORAL EVERY MORNING
Status: DISCONTINUED | OUTPATIENT
Start: 2018-12-09 | End: 2018-12-12 | Stop reason: HOSPADM

## 2018-12-09 RX ORDER — SODIUM CHLORIDE 0.9 % (FLUSH) 0.9 %
10 SYRINGE (ML) INJECTION AS NEEDED
Status: DISCONTINUED | OUTPATIENT
Start: 2018-12-09 | End: 2018-12-12 | Stop reason: HOSPADM

## 2018-12-09 RX ORDER — ACETAMINOPHEN 325 MG/1
650 TABLET ORAL ONCE
Status: COMPLETED | OUTPATIENT
Start: 2018-12-09 | End: 2018-12-09

## 2018-12-09 RX ORDER — DIPHENHYDRAMINE HCL 25 MG
25 CAPSULE ORAL ONCE
Status: COMPLETED | OUTPATIENT
Start: 2018-12-09 | End: 2018-12-09

## 2018-12-09 RX ORDER — SODIUM CHLORIDE 0.9 % (FLUSH) 0.9 %
3 SYRINGE (ML) INJECTION EVERY 12 HOURS SCHEDULED
Status: DISCONTINUED | OUTPATIENT
Start: 2018-12-09 | End: 2018-12-12 | Stop reason: HOSPADM

## 2018-12-09 RX ORDER — ACETAMINOPHEN 325 MG/1
650 TABLET ORAL EVERY 4 HOURS PRN
Status: DISCONTINUED | OUTPATIENT
Start: 2018-12-09 | End: 2018-12-12 | Stop reason: HOSPADM

## 2018-12-09 RX ORDER — LEVETIRACETAM 5 MG/ML
500 INJECTION INTRAVASCULAR EVERY 12 HOURS SCHEDULED
Status: DISCONTINUED | OUTPATIENT
Start: 2018-12-09 | End: 2018-12-10 | Stop reason: CLARIF

## 2018-12-09 RX ORDER — LEVETIRACETAM 10 MG/ML
1000 INJECTION INTRAVASCULAR ONCE
Status: COMPLETED | OUTPATIENT
Start: 2018-12-09 | End: 2018-12-09

## 2018-12-09 RX ORDER — HYDROCODONE BITARTRATE AND ACETAMINOPHEN 5; 325 MG/1; MG/1
1 TABLET ORAL EVERY 6 HOURS PRN
Status: DISCONTINUED | OUTPATIENT
Start: 2018-12-09 | End: 2018-12-12 | Stop reason: HOSPADM

## 2018-12-09 RX ADMIN — HYDROCODONE BITARTRATE AND ACETAMINOPHEN 1 TABLET: 5; 325 TABLET ORAL at 12:18

## 2018-12-09 RX ADMIN — HYDROCODONE BITARTRATE AND ACETAMINOPHEN 1 TABLET: 5; 325 TABLET ORAL at 17:42

## 2018-12-09 RX ADMIN — PANTOPRAZOLE SODIUM 40 MG: 40 TABLET, DELAYED RELEASE ORAL at 11:37

## 2018-12-09 RX ADMIN — LEVETIRACETAM 1000 MG: 10 INJECTION INTRAVENOUS at 03:30

## 2018-12-09 RX ADMIN — SODIUM CHLORIDE, PRESERVATIVE FREE 3 ML: 5 INJECTION INTRAVENOUS at 21:32

## 2018-12-09 RX ADMIN — DIPHENHYDRAMINE HYDROCHLORIDE 25 MG: 25 CAPSULE ORAL at 22:25

## 2018-12-09 RX ADMIN — GADOBENATE DIMEGLUMINE 20 ML: 529 INJECTION, SOLUTION INTRAVENOUS at 13:44

## 2018-12-09 RX ADMIN — SODIUM CHLORIDE, PRESERVATIVE FREE 3 ML: 5 INJECTION INTRAVENOUS at 11:44

## 2018-12-09 RX ADMIN — LEVETIRACETAM 500 MG: 5 INJECTION INTRAVENOUS at 21:54

## 2018-12-09 RX ADMIN — ESCITALOPRAM 10 MG: 10 TABLET, FILM COATED ORAL at 11:37

## 2018-12-09 RX ADMIN — ACETAMINOPHEN 650 MG: 325 TABLET, FILM COATED ORAL at 22:24

## 2018-12-09 RX ADMIN — LEVETIRACETAM 500 MG: 5 INJECTION INTRAVENOUS at 11:37

## 2018-12-09 RX ADMIN — ONDANSETRON 4 MG: 2 INJECTION INTRAMUSCULAR; INTRAVENOUS at 05:23

## 2018-12-09 NOTE — ED NOTES
Per family- pt was playing video games when he began shaking and his jaw locked up and he couldn't talk to her, she reports it lasting approx 1.5 minutes. She also reports difficult to arouse after this. She denies hx of seizures.      Monica Garcia, RN  12/09/18 0112

## 2018-12-09 NOTE — H&P
"A Admission H&P    Patient Care Team:  Epley, James, APRN as PCP - General (Family Medicine)    Chief complaint: \"I had a seizure\"    History of Present Illness  This is a 43-year-old male with minimal past medical history who presented to the emergency room after suffering a seizure yesterday.  This was witnessed by his wife who states that he had generalized jerking motions with unconsciousness.  No loss of bladder or bowel control.  No tongue biting.  The episode lasted for about 1-1/2 minutes.  EMS was called.  He was brought to the emergency room and had a second seizure while in route.  In the ER he had a head scan that showed a 11 mm left frontal lobe lesion.  He was loaded with Keppra and admitted.  Currently the patient has no complaints.  States he is feeling very nervous and anxious over the new findings.  He is also found to be in atrial fibrillation which is a new diagnosis for him.  He denies any shortness of breath or palpitations but does state that he has had intermittent episodes of chest tightness over the past several weeks.      Past Medical History:   Diagnosis Date   • Allergic     seasonal   • Anxiety    • Anxiety and depression    • Depression    • GERD (gastroesophageal reflux disease)    • History of prostatitis    • Injury of right heel 07/27/2018   • Insomnia    • Joint pain    • Kidney stones 2017    1997, 2010 also   • Left rotator cuff tear    • Prostatitis    • Right rotator cuff tear      Past Surgical History:   Procedure Laterality Date   • CHOLECYSTECTOMY     • INGUINAL HERNIA REPAIR     • KIDNEY STONE SURGERY     • TONSILLECTOMY     • VASECTOMY       Family History   Problem Relation Age of Onset   • Cancer Mother    • Hypertension Mother    • Nephrolithiasis Sister    • Heart attack Maternal Grandmother    • Cancer Maternal Grandmother      Social History     Tobacco Use   • Smoking status: Never Smoker   • Smokeless tobacco: Never Used   Substance Use Topics   • Alcohol use: " No   • Drug use: No     Medications Prior to Admission   Medication Sig Dispense Refill Last Dose   • escitalopram (LEXAPRO) 10 MG tablet Take 1 tablet by mouth Daily. 90 tablet 2    • fluticasone (FLONASE SENSIMIST) 27.5 MCG/SPRAY nasal spray 2 sprays into each nostril Daily.   Taking   • levocetirizine (XYZAL) 5 MG tablet Take 1 tablet by mouth Every Evening. 90 tablet 2    • naproxen (NAPROSYN) 500 MG tablet Take 1 tablet by mouth 2 (Two) Times a Day With Meals. As needed for pain 60 tablet 5    • omeprazole (priLOSEC) 20 MG capsule Take 1 capsule by mouth Daily. 90 capsule 2 Taking   • zolpidem (AMBIEN) 5 MG tablet Take 2.5 mg by mouth At Night As Needed.   Taking   • azelastine (ASTELIN) 0.1 % nasal spray 1-2 sprays twice daily as needed for nasal congestion 1 each 12 Taking   • MethylPREDNISolone (MEDROL, RAFA,) 4 MG tablet Take as directed on package instructions. 21 tablet 0    • nitroglycerin (NITRODUR) 0.1 MG/HR patch Use as directed 10 patch 0 Taking   • tamsulosin (FLOMAX) 0.4 MG capsule 24 hr capsule Take 1 capsule by mouth Every Night.   Taking   • traZODone (DESYREL) 50 MG tablet Take 1 tablet by mouth Every Night. As needed for insomnia 30 tablet 6 Taking     Allergies:  Ciprofloxacin    Review of Systems   Constitutional: Negative for chills and fever.   HENT: Negative for congestion and sore throat.    Eyes: Negative for visual disturbance.   Respiratory: Positive for chest tightness. Negative for cough, shortness of breath and wheezing.    Cardiovascular: Negative for chest pain, palpitations and leg swelling.   Gastrointestinal: Negative for abdominal distention, abdominal pain, diarrhea, nausea and vomiting.   Endocrine: Negative for polydipsia and polyuria.   Genitourinary: Negative for difficulty urinating, dysuria, frequency and urgency.   Musculoskeletal: Negative for arthralgias and myalgias.   Skin: Negative for color change and rash.   Neurological: Positive for seizures. Negative for  dizziness and light-headedness.        PHYSICAL EXAM    Vital Signs  tMax 24 hrs:  Temp (24hrs), Av.9 °F (36.6 °C), Min:97.5 °F (36.4 °C), Max:98.1 °F (36.7 °C)    Vitals Ranges:  Temp:  [97.5 °F (36.4 °C)-98.1 °F (36.7 °C)] 98 °F (36.7 °C)  Heart Rate:  [] 91  Resp:  [15-20] 20  BP: (101-144)/() 101/80    Physical Exam   Constitutional: He is oriented to person, place, and time. He appears well-developed and well-nourished.   HENT:   Head: Normocephalic and atraumatic.   Eyes: EOM are normal. Pupils are equal, round, and reactive to light.   Neck: Neck supple. No tracheal deviation present.   Cardiovascular: Normal rate. Exam reveals no gallop.   No murmur heard.  Irregular rhythm with controlled rate   Pulmonary/Chest: Effort normal. No respiratory distress. He has no wheezes.   Abdominal: Soft. Bowel sounds are normal. He exhibits no distension. There is no tenderness.   Musculoskeletal: He exhibits no edema or tenderness.   Neurological: He is alert and oriented to person, place, and time. No cranial nerve deficit.   Skin: Skin is warm and dry.   Nursing note and vitals reviewed.      Results Review:  Results from last 7 days   Lab Units  18   0130   WBC 10*3/mm3  13.84*   HEMOGLOBIN g/dL  15.4   HEMATOCRIT %  44.1   PLATELETS 10*3/mm3  332     Results from last 7 days   Lab Units  18   0130   SODIUM mmol/L  140   POTASSIUM mmol/L  4.4   CHLORIDE mmol/L  102   CO2 mmol/L  25.4   BUN mg/dL  20   CREATININE mg/dL  0.91   CALCIUM mg/dL  8.5*   BILIRUBIN mg/dL  0.3   ALK PHOS U/L  103   ALT (SGPT) U/L  30   AST (SGOT) U/L  24   GLUCOSE mg/dL  116*     Head CT shows a 11 mm left frontal lobe lesion concerning for a mass     I reviewed the patient's new clinical results.  I reviewed the patient's new imaging results and agree with the interpretation.  I personally viewed and interpreted the patient's EKG/Telemetry data        Active Hospital Problems    Diagnosis Date Noted   • **New onset  seizure (CMS/HCC) [R56.9] 12/09/2018   • Atrial fibrillation (CMS/HCC) [I48.91] 12/09/2018   • Frontal mass of brain [G93.9] 12/09/2018   • Depression [F32.9] 07/26/2018      Resolved Hospital Problems   No resolved problems to display.       Assessment & Plan    The patient was admitted overnight last night.  He will undergo brain MRI for further evaluation of the left frontal lobe lesion.  Neurosurgery consultation.  Should this appear malignant on MRI then would proceed with CT of chest, abdomen, and pelvis.  Continue IV Keppra and await neurology recommendations regarding seizure activity prior to presentation.  Cardiology has also been consulted for what appears to be new onset atrial fibrillation.  I will order an echocardiogram and check magnesium along with TSH.  He has been placed on a beta blocker but we will hold off on anticoagulation until he has additional workup of the brain lesion.  Additional plans based on his clinical course.    I discussed the patients findings and my recommendations with patient, family, nursing staff and consulting provider    Samir Mathews MD  12/09/18  11:07 AM

## 2018-12-09 NOTE — CONSULTS
Patient Identification:  NAME:  Austin Orta  Age:  43 y.o.   Sex:  male   :  1975   MRN:  [id number]       Chief complaint: I guess I had a seizure, reason for consult new onset seizure    History of present illness:  This patient is a 43-year-old right-handed white male with a history of insomnia joint pain allergies anxiety who comes to the hospital after he was apparently on the couch and then was witnessed having generalized seizure duration less than a minute tonic-clonic in type associated symptoms very postictal confused and fighting everyone afterwards he didn't get any warning he states he just noted everyone was over him and he was very confused context patient on CT scan appears to have a left frontal mass possibly extra-axial.  We are getting an MRI with and without contrast.  Location of his episode it was generalized duration less than a minute modifying factors Ativan and he is been started on Keppra.  He denies taking any benzodiazepines at home and is not a significant drinker at all he has not had any headaches paresthesias or paralysis  As a context when he came to the hospital he was in A. fib with rapid ventricular response      Past medical history:  Past Medical History:   Diagnosis Date   • Allergic     seasonal   • Anxiety    • Anxiety and depression    • Depression    • GERD (gastroesophageal reflux disease)    • History of prostatitis    • Injury of right heel 2018   • Insomnia    • Joint pain    • Kidney stones 2017,  also   • Left rotator cuff tear    • Prostatitis    • Right rotator cuff tear        Past surgical history:  Past Surgical History:   Procedure Laterality Date   • CHOLECYSTECTOMY     • INGUINAL HERNIA REPAIR     • KIDNEY STONE SURGERY     • TONSILLECTOMY     • VASECTOMY         Allergies:  Ciprofloxacin    Home medications:  Medications Prior to Admission   Medication Sig Dispense Refill Last Dose   • escitalopram (LEXAPRO) 10 MG tablet Take 1  tablet by mouth Daily. 90 tablet 2    • fluticasone (FLONASE SENSIMIST) 27.5 MCG/SPRAY nasal spray 2 sprays into each nostril Daily.   Taking   • levocetirizine (XYZAL) 5 MG tablet Take 1 tablet by mouth Every Evening. 90 tablet 2    • naproxen (NAPROSYN) 500 MG tablet Take 1 tablet by mouth 2 (Two) Times a Day With Meals. As needed for pain 60 tablet 5    • omeprazole (priLOSEC) 20 MG capsule Take 1 capsule by mouth Daily. 90 capsule 2 Taking   • zolpidem (AMBIEN) 5 MG tablet Take 2.5 mg by mouth At Night As Needed.   Taking   • azelastine (ASTELIN) 0.1 % nasal spray 1-2 sprays twice daily as needed for nasal congestion 1 each 12 Taking   • MethylPREDNISolone (MEDROL, RAFA,) 4 MG tablet Take as directed on package instructions. 21 tablet 0    • nitroglycerin (NITRODUR) 0.1 MG/HR patch Use as directed 10 patch 0 Taking   • tamsulosin (FLOMAX) 0.4 MG capsule 24 hr capsule Take 1 capsule by mouth Every Night.   Taking   • traZODone (DESYREL) 50 MG tablet Take 1 tablet by mouth Every Night. As needed for insomnia 30 tablet 6 Taking        Hospital medications:    escitalopram 10 mg Oral Daily   levETIRAcetam 500 mg Intravenous Q12H   pantoprazole 40 mg Oral QAM   sodium chloride 3 mL Intravenous Q12H        •  acetaminophen  •  HYDROcodone-acetaminophen  •  ondansetron  •  [COMPLETED] Insert peripheral IV **AND** sodium chloride  •  sodium chloride    Family history:  Family History   Problem Relation Age of Onset   • Cancer Mother    • Hypertension Mother    • Nephrolithiasis Sister    • Heart attack Maternal Grandmother    • Cancer Maternal Grandmother        Social history:  Social History     Tobacco Use   • Smoking status: Never Smoker   • Smokeless tobacco: Never Used   Substance Use Topics   • Alcohol use: No   • Drug use: No       Review of systems:     no focal paresthesias no change in vision no paralysis no hair eyes ears nose throat skin bone joint  lymphatic hematologic or oncologic complaints no neck  pain chest pain abdominal pain bowel bladder incontinence fever chills or rash    Objective:  Vitals Ranges:   Temp:  [97.5 °F (36.4 °C)-98.1 °F (36.7 °C)] 98 °F (36.7 °C)  Heart Rate:  [] 91  Resp:  [15-20] 20  BP: (101-144)/() 101/80      Physical Exam:  Patient is awake alert oriented ×3 in no distress fund of knowledge is normal memory is normal language is normal awake alert oriented ×3 in no distress fund of knowledge attention span and concentration recent remote memory and language is normal well-developed well-nourished in no distress pupils to constricting to 1 one half normal disc retinas visual fields extraocular movements full without nystagmus nasolabial folds palate tongue symmetrical normal hearing facial sensation head turning shoulder shrug motor 5 out of 5 times for extremities no atrophy fasciculations rigidity bradykinesia resting tremor reflexes trace throughout symmetrical toes downgoing bilaterally no atrophy fasciculations rigidity bradykinesia resting tremor reflexes trace throughout symmetrical toes downgoing bilaterally sensation normal light touch face both arms both legs coordination normal in the upper extremities station and gait is normal heart regular without murmur neck supple without bruits extremities no clubbing cyanosis edema visual acuity normal at 3 feet    Results review:   I reviewed the patient's new clinical results.    Data review:  Lab Results (last 24 hours)     Procedure Component Value Units Date/Time    TSH [[id number]]  (Normal) Collected:  12/09/18 0130    Specimen:  Blood Updated:  12/09/18 1056     TSH 2.270 mIU/mL     Magnesium [[id number]]  (Normal) Collected:  12/09/18 0130    Specimen:  Blood Updated:  12/09/18 1042     Magnesium 2.3 mg/dL     Urine Drug Screen - Urine, Clean Catch [[id number]]  (Normal) Collected:  12/09/18 0138    Specimen:  Urine, Clean Catch Updated:  12/09/18 0245     Amphet/Methamphet, Screen Negative     Barbiturates Screen,  Urine Negative     Benzodiazepine Screen, Urine Negative     Cocaine Screen, Urine Negative     Opiate Screen Negative     THC, Screen, Urine Negative     Methadone Screen, Urine Negative     Oxycodone Screen, Urine Negative    Narrative:       Negative Thresholds For Drugs Screened:     Amphetamines               500 ng/ml   Barbiturates               200 ng/ml   Benzodiazepines            100 ng/ml   Cocaine                    300 ng/ml   Methadone                  300 ng/ml   Opiates                    300 ng/ml   Oxycodone                  100 ng/ml   THC                        50 ng/ml    The Normal Value for all drugs tested is negative. This report includes final unconfirmed screening results to be used for medical treatment purposes only. Unconfirmed results must not be used for non-medical purposes such as employment or legal testing. Clinical consideration should be applied to any drug of abuse test, particulary when unconfirmed results are used.    Urinalysis With Microscopic If Indicated (No Culture) - Urine, Clean Catch [[id number]]  (Abnormal) Collected:  12/09/18 0138    Specimen:  Urine, Clean Catch Updated:  12/09/18 0234     Color, UA Yellow     Appearance, UA Clear     pH, UA <=5.0     Specific Gravity, UA 1.027     Glucose, UA Negative     Ketones, UA Trace     Bilirubin, UA Negative     Blood, UA Negative     Protein, UA 30 mg/dL (1+)     Leuk Esterase, UA Negative     Nitrite, UA Negative     Urobilinogen, UA 0.2 E.U./dL    Urinalysis, Microscopic Only - Urine, Clean Catch [[id number]] Collected:  12/09/18 0138    Specimen:  Urine, Clean Catch Updated:  12/09/18 0234     RBC, UA 0-2 /HPF      WBC, UA 0-2 /HPF      Bacteria, UA None Seen /HPF      Squamous Epithelial Cells, UA 0-2 /HPF      Hyaline Casts, UA 0-2 /LPF      Methodology Automated Microscopy    CBC & Differential [[id number]] Collected:  12/09/18 0130    Specimen:  Blood Updated:  12/09/18 0211    Narrative:       The following orders  were created for panel order CBC & Differential.  Procedure                               Abnormality         Status                     ---------                               -----------         ------                     Manual Differential[[id number]]          Abnormal            Final result               Scan Slide[[id number]]                                       Final result               CBC Auto Differential[[id number]]        Abnormal            Final result                 Please view results for these tests on the individual orders.    CBC Auto Differential [[id number]]  (Abnormal) Collected:  12/09/18 0130    Specimen:  Blood Updated:  12/09/18 0211     WBC 13.84 10*3/mm3      RBC 5.27 10*6/mm3      Hemoglobin 15.4 g/dL      Hematocrit 44.1 %      MCV 83.7 fL      MCH 29.2 pg      MCHC 34.9 g/dL      RDW 12.9 %      RDW-SD 38.8 fl      MPV 9.0 fL      Platelets 332 10*3/mm3     Scan Slide [[id number]] Collected:  12/09/18 0130    Specimen:  Blood Updated:  12/09/18 0211     Scan Slide --     Comment: See Manual Differential Results       Manual Differential [[id number]]  (Abnormal) Collected:  12/09/18 0130    Specimen:  Blood Updated:  12/09/18 0211     Neutrophil % 53.0 %      Lymphocyte % 35.0 %      Monocyte % 4.0 %      Metamyelocyte % 2.0 %      Myelocyte % 3.0 %      Atypical Lymphocyte % 3.0 %      Neutrophils Absolute 7.34 10*3/mm3      Lymphocytes Absolute 4.84 10*3/mm3      Monocytes Absolute 0.55 10*3/mm3      RBC Morphology Normal     Smudge Cells Slight/1+     Large Platelets Slight/1+    Comprehensive Metabolic Panel [[id number]]  (Abnormal) Collected:  12/09/18 0130    Specimen:  Blood Updated:  12/09/18 0206     Glucose 116 mg/dL      BUN 20 mg/dL      Creatinine 0.91 mg/dL      Sodium 140 mmol/L      Potassium 4.4 mmol/L      Chloride 102 mmol/L      CO2 25.4 mmol/L      Calcium 8.5 mg/dL      Total Protein 6.9 g/dL      Albumin 3.70 g/dL      ALT (SGPT) 30 U/L      AST (SGOT) 24 U/L       Comment: Specimen hemolyzed.  Results may be affected.        Alkaline Phosphatase 103 U/L      Total Bilirubin 0.3 mg/dL      eGFR Non African Amer 91 mL/min/1.73      Globulin 3.2 gm/dL      A/G Ratio 1.2 g/dL      BUN/Creatinine Ratio 22.0     Anion Gap 12.6 mmol/L     Troponin [[id number]]  (Normal) Collected:  12/09/18 0130    Specimen:  Blood Updated:  12/09/18 0203     Troponin T <0.010 ng/mL     Narrative:       Troponin T Reference Ranges:  Less than 0.03 ng/mL:    Negative for AMI  0.03 to 0.09 ng/mL:      Indeterminant for AMI  Greater than 0.09 ng/mL: Positive for AMI    Protime-INR [[id number]]  (Normal) Collected:  12/09/18 0130    Specimen:  Blood Updated:  12/09/18 0159     Protime 12.7 Seconds      INR 0.97    POC Glucose Once [[id number]]  (Normal) Collected:  12/09/18 0114    Specimen:  Blood Updated:  12/09/18 0117     Glucose 110 mg/dL            Imaging:  Imaging Results (last 24 hours)     Procedure Component Value Units Date/Time    CT Head Without Contrast [[id number]] Collected:  12/09/18 0159     Updated:  12/09/18 0209    Narrative:       CRANIAL CT SCAN WITHOUT CONTRAST     CLINICAL HISTORY: seizure     COMPARISON: None.     TECHNIQUE: Radiation dose reduction techniques were utilized, including  automated exposure control and exposure modulation based on body size.  Multiple axial images of the head were obtained without contrast.      FINDINGS:  There is an 11 mm ovoid focus of increased density medially  in the left frontal lobe best seen on axial image 31 and coronal image  27. A small mass is suspected. This is not felt to be related to  hemorrhage. Nonemergent gadolinium enhanced MRI is recommended for  further evaluation and characterization  remainder the brain parenchyma  is unremarkable. There is no hydrocephalus. Bone window images are  unremarkable.                Impression:       1. 11 mm hyperdense focus medially in the left frontal lobe suspicious  for small mass. Follow-up  as above.                     This report was finalized on 12/9/2018 2:05 AM by Rafita Lantigua M.D.                Assessment and Plan:       New onset seizure (CMS/HCC)    Depression    Atrial fibrillation (CMS/HCC)    Frontal mass of brain    New onset seizure in the presence of new onset a fib.  Ideally this patient should have A. fib initially followed by decreased cerebral perfusion and subsequent generalized seizure  - a benign phenomena.  Performed an independent eyeball review of the CAT scan and I have ordered additional diagnostic testing in the form of an MRI scan that I will also independently eyeball review  Nonetheless he is noted to have a left frontal mass that hopefully will be a meningioma and not a malignant neoplasm.  Obviously he needs an MRI of the brain with and without contrast and neurosurgery is following.  He does not appear to have any focal component to the seizure and that is a good sign.  We will see what the MRI scan shows.      Jason Brownlee MD  12/09/18  1:52 PM

## 2018-12-09 NOTE — CONSULTS
[facility]  Cardiology Consult Note    Patient Identification:  Name: Austin Orta  Age: 43 y.o.  Sex: male  :  1975  MRN: [id number]             Requesting Physician: Dr. Mathews    Reason for Consultation / Chief Complaint: management recommendations  atrial fibrillation    History of Present Illness:   43-year-old male with a significant medical issues, has been admitted to the hospital with seizure activity, denies any chest pains or tightness in the chest, was found to have atrial fibrillation with uncontrolled rate    Comorbid cardiac risk factors:     Past Medical History:  Past Medical History:   Diagnosis Date   • Allergic     seasonal   • Anxiety    • Anxiety and depression    • Depression    • GERD (gastroesophageal reflux disease)    • History of prostatitis    • Injury of right heel 2018   • Insomnia    • Joint pain    • Kidney stones 2017,  also   • Left rotator cuff tear    • Prostatitis    • Right rotator cuff tear      Past Surgical History:  Past Surgical History:   Procedure Laterality Date   • CHOLECYSTECTOMY     • INGUINAL HERNIA REPAIR     • KIDNEY STONE SURGERY     • TONSILLECTOMY     • VASECTOMY        Allergies:  Allergies   Allergen Reactions   • Ciprofloxacin Nausea And Vomiting and Myalgia     Home Meds:  Medications Prior to Admission   Medication Sig Dispense Refill Last Dose   • escitalopram (LEXAPRO) 10 MG tablet Take 1 tablet by mouth Daily. 90 tablet 2    • fluticasone (FLONASE SENSIMIST) 27.5 MCG/SPRAY nasal spray 2 sprays into each nostril Daily.   Taking   • levocetirizine (XYZAL) 5 MG tablet Take 1 tablet by mouth Every Evening. 90 tablet 2    • naproxen (NAPROSYN) 500 MG tablet Take 1 tablet by mouth 2 (Two) Times a Day With Meals. As needed for pain 60 tablet 5    • omeprazole (priLOSEC) 20 MG capsule Take 1 capsule by mouth Daily. 90 capsule 2 Taking   • zolpidem (AMBIEN) 5 MG tablet Take 2.5 mg by mouth At Night As Needed.    Taking   • azelastine (ASTELIN) 0.1 % nasal spray 1-2 sprays twice daily as needed for nasal congestion 1 each 12 Taking   • MethylPREDNISolone (MEDROL, RAFA,) 4 MG tablet Take as directed on package instructions. 21 tablet 0    • nitroglycerin (NITRODUR) 0.1 MG/HR patch Use as directed 10 patch 0 Taking   • tamsulosin (FLOMAX) 0.4 MG capsule 24 hr capsule Take 1 capsule by mouth Every Night.   Taking   • traZODone (DESYREL) 50 MG tablet Take 1 tablet by mouth Every Night. As needed for insomnia 30 tablet 6 Taking     Current Meds:   [unfilled]  Social History:   Social History     Tobacco Use   • Smoking status: Never Smoker   • Smokeless tobacco: Never Used   Substance Use Topics   • Alcohol use: No      Family History:  Family History   Problem Relation Age of Onset   • Cancer Mother    • Hypertension Mother    • Nephrolithiasis Sister    • Heart attack Maternal Grandmother    • Cancer Maternal Grandmother         Review of Systems    Constitutional: Weakness    Eyes: No vision changes, eye pain   ENT/oropharynx: No difficulty swallowing, sore throat, epistaxis, changes in hearing   Cardiovascular: No chest pain, chest tightness, palpitations, paroxysmal nocturnal dyspnea, orthopnea, diaphoresis, dizziness / syncopal episode   Respiratory: No shortness of breath, dyspnea on exertion, cough, wheezing hemoptysis   Gastrointestinal: No abdominal pain, nausea, vomiting, diarrhea, bloody stools   Genitourinary: No hematuria, dysuria   Neurological: Seizures    Musculoskeletal: No cramps, myalgias,  joint pain, joint swelling   Integument: No rash, edema           Constitutional:  Temp:  [97.5 °F (36.4 °C)-98.1 °F (36.7 °C)] 97.5 °F (36.4 °C)  Heart Rate:  [] 83  Resp:  [15-20] 20  BP: (104-144)/() 104/77    Physical Exam   General:  Appears in no acute distress  Eyes: PERTL,  HEENT:  No JVD. Thyroid not visibly enlarged. No mucosal pallor or cyanosis  Respiratory: Respirations regular and unlabored at rest.  BBS with good air entry in all fields. No crackles, rubs or wheezes auscultated  Cardiovascular: S1S2 Regular rate and rhythm. No murmur, rub or gallop auscultated. No carotid bruits. DP/PT pulses    . No pretibial pitting edema  Gastrointestinal: Abdomen soft, flat, non tender. Bowel sounds present. No hepatosplenomegaly. No ascites  Musculoskeletal: [place] x4. No abnormal movements  Extremities: No digital clubbing or cyanosis  Skin: Color pink. Skin warm and dry to touch. No rashes  No xanthoma  Neuro: AAO x3 CN II-XII grossly intact              Cardiographics  ECG:         Telemetry:    Echocardiogram:     Imaging  Chest X-ray:     Lab Review   Results from last 7 days   Lab Units  12/09/18   0130   TROPONIN T ng/mL  <0.010         Results from last 7 days   Lab Units  12/09/18   0130   SODIUM mmol/L  140   POTASSIUM mmol/L  4.4   BUN mg/dL  20   CREATININE mg/dL  0.91   CALCIUM mg/dL  8.5*     @[id number]@  Results from last 7 days   Lab Units  12/09/18   0130   WBC 10*3/mm3  13.84*   HEMOGLOBIN g/dL  15.4   HEMATOCRIT %  44.1   PLATELETS 10*3/mm3  332     Results from last 7 days   Lab Units  12/09/18   0130   INR   0.97         Assessment:    New onset atrial fibrillation    Recommendations / Plan:   43-year-old male admitted with a new onset of seizures was found for the atrial fibrillation, with no previous history of coronary artery disease    Atrial fibrillation rate appears to be controlled at this point we'll add low-dose beta blockers    Considering the medical issues at this point we will hold off on anticoagulation until further clarification    We'll repeat the EKGs troponin and echocardiogram      Bashir Syed MD  12/9/2018, 9:47 AM      EMR Dragon/Transcription:   Dictated utilizing Dragon dictation

## 2018-12-09 NOTE — CONSULTS
[facility]    Austin Orta  1975  [id number]      Referring Provider: Jairo Siegel MD  [address] [place] [address]  Reason for Consultation: Brain mass and seizure    Patient Care Team:  Epley, James, APRN as PCP - General (Family Medicine)    Chief complaint : Seizure    Subjective .     History of present illness:  The patient is a very pleasant 43-year-old man whose wife noted him to roll over on the couch last evening at about midnight and then started to have jerking motions.  She could not lateralize the jerking.  She was not able to arouse him.  The patient did not lose bowel or bladder control or bite his tongue.  Afterwards he was tired and somewhat confused.  EMS was called.  There was a second seizure and transected.    A CT was obtained that demonstrates a left frontal lesion very much against the falx cerebri.    He was given intravenous Breath admitted to the intensive care unit.        Review of Systems  All systems were reviewed and negative except for:  Constitution:  positive for weight loss    History  Past Medical History:   Diagnosis Date   • Allergic     seasonal   • Anxiety    • Anxiety and depression    • Depression    • GERD (gastroesophageal reflux disease)    • History of prostatitis    • Injury of right heel 07/27/2018   • Insomnia    • Joint pain    • Kidney stones 2017    1997, 2010 also   • Left rotator cuff tear    • Prostatitis    • Right rotator cuff tear    ,   Past Surgical History:   Procedure Laterality Date   • CHOLECYSTECTOMY     • INGUINAL HERNIA REPAIR     • KIDNEY STONE SURGERY     • TONSILLECTOMY     • VASECTOMY     ,   Family History   Problem Relation Age of Onset   • Cancer Mother    • Hypertension Mother    • Nephrolithiasis Sister    • Heart attack Maternal Grandmother    • Cancer Maternal Grandmother    ,   Social History     Tobacco Use   • Smoking status: Never Smoker   • Smokeless tobacco: Never Used    Substance Use Topics   • Alcohol use: No   • Drug use: No   ,   Medications Prior to Admission   Medication Sig Dispense Refill Last Dose   • escitalopram (LEXAPRO) 10 MG tablet Take 1 tablet by mouth Daily. 90 tablet 2    • fluticasone (FLONASE SENSIMIST) 27.5 MCG/SPRAY nasal spray 2 sprays into each nostril Daily.   Taking   • levocetirizine (XYZAL) 5 MG tablet Take 1 tablet by mouth Every Evening. 90 tablet 2    • naproxen (NAPROSYN) 500 MG tablet Take 1 tablet by mouth 2 (Two) Times a Day With Meals. As needed for pain 60 tablet 5    • omeprazole (priLOSEC) 20 MG capsule Take 1 capsule by mouth Daily. 90 capsule 2 Taking   • zolpidem (AMBIEN) 5 MG tablet Take 2.5 mg by mouth At Night As Needed.   Taking   • azelastine (ASTELIN) 0.1 % nasal spray 1-2 sprays twice daily as needed for nasal congestion 1 each 12 Taking   • MethylPREDNISolone (MEDROL, RAFA,) 4 MG tablet Take as directed on package instructions. 21 tablet 0    • nitroglycerin (NITRODUR) 0.1 MG/HR patch Use as directed 10 patch 0 Taking   • tamsulosin (FLOMAX) 0.4 MG capsule 24 hr capsule Take 1 capsule by mouth Every Night.   Taking   • traZODone (DESYREL) 50 MG tablet Take 1 tablet by mouth Every Night. As needed for insomnia 30 tablet 6 Taking   , Scheduled Meds:    escitalopram 10 mg Oral Daily   levETIRAcetam 500 mg Intravenous Q12H   pantoprazole 40 mg Oral QAM   sodium chloride 3 mL Intravenous Q12H    and Allergies:  Ciprofloxacin    Objective     Vital Signs   Temp:  [97.5 °F (36.4 °C)-98.1 °F (36.7 °C)] 98 °F (36.7 °C)  Heart Rate:  [] 91  Resp:  [15-20] 20  BP: (101-144)/() 101/80      Physical Exam:     General Appearance:    Alert, cooperative, in no acute distress   Head:    Normocephalic, without obvious abnormality, atraumatic   Eyes:            Lids and lashes normal, conjunctivae and sclerae normal, no   icterus, no pallor, corneas clear, PERRLA   Ears:    Ears appear intact with no abnormalities noted   Throat:   No oral  lesions, no thrush, oral mucosa moist   Neck:   No adenopathy, supple, trachea midline, no thyromegaly, no   carotid bruit, no JVD   Back:     No kyphosis present, no scoliosis present, no skin lesions,      erythema or scars, no tenderness to percussion or                   palpation,   non tender to palpation of bony midline, normal painless ROM   Lungs:     respirations regular, even and                  unlabored    Heart:    Regular rhythm and normal rate   Chest Wall:    No abnormalities observed   Abdomen:     No masses, no organomegaly, soft        non-tender, non-distended, no guarding, no rebound                tenderness   Rectal:     Deferred   Extremities:   Moves all extremities well, no edema, no cyanosis, no             redness   Pulses:   Pulses palpable and equal bilaterally   Skin:   No bleeding, bruising or rash   Lymph nodes:   No palpable adenopathy   Neurologic: Alert and oriented x 3, gait normal., reflexes normal and symmetric, strength and  sensation grossly normal      Mental status is awake and alert, oriented to person place and time  Recall is 3/3 immediate and 3/3 at five minutes  Follows 3 step commands  CN: pupils reactive, EOMI, VF full to confrontation, unable to visualize fundi, v1-3 intact, Face symmetric, Hearing intact to finger rub, Palate raises Sym, Gag deferred, Shrug intact, Tongue protrudes in midline  Str: 5/5 deltoid/biceps/triceps/wrist extensors/iliopsoas/ quadriceps/hamstrings/dorsiflexors/extensor hallucis longus/plantarflexors  Sensory: intact to light touch/pinprick/proprioception in all extremities  Gait: not able to ambulate  Reflexes: 2+ throughout UE and LE, no yoder's or clonus           Results Review:   I reviewed the patient's new clinical results.  I reviewed the patient's new imaging results and agree with the interpretation.  I personally reviewed the CT scan of this demonstrates a high convexity medial left frontal lobe hyperdensity  Lab Results  (last 24 hours)     Procedure Component Value Units Date/Time    POC Glucose Once [[id number]]  (Normal) Collected:  12/09/18 0114    Specimen:  Blood Updated:  12/09/18 0117     Glucose 110 mg/dL     CBC & Differential [[id number]] Collected:  12/09/18 0130    Specimen:  Blood Updated:  12/09/18 0211    Narrative:       The following orders were created for panel order CBC & Differential.  Procedure                               Abnormality         Status                     ---------                               -----------         ------                     Manual Differential[[id number]]          Abnormal            Final result               Scan Slide[[id number]]                                       Final result               CBC Auto Differential[[id number]]        Abnormal            Final result                 Please view results for these tests on the individual orders.    Comprehensive Metabolic Panel [[id number]]  (Abnormal) Collected:  12/09/18 0130    Specimen:  Blood Updated:  12/09/18 0206     Glucose 116 mg/dL      BUN 20 mg/dL      Creatinine 0.91 mg/dL      Sodium 140 mmol/L      Potassium 4.4 mmol/L      Chloride 102 mmol/L      CO2 25.4 mmol/L      Calcium 8.5 mg/dL      Total Protein 6.9 g/dL      Albumin 3.70 g/dL      ALT (SGPT) 30 U/L      AST (SGOT) 24 U/L      Comment: Specimen hemolyzed.  Results may be affected.        Alkaline Phosphatase 103 U/L      Total Bilirubin 0.3 mg/dL      eGFR Non African Amer 91 mL/min/1.73      Globulin 3.2 gm/dL      A/G Ratio 1.2 g/dL      BUN/Creatinine Ratio 22.0     Anion Gap 12.6 mmol/L     Protime-INR [[id number]]  (Normal) Collected:  12/09/18 0130    Specimen:  Blood Updated:  12/09/18 0159     Protime 12.7 Seconds      INR 0.97    Troponin [[id number]]  (Normal) Collected:  12/09/18 0130    Specimen:  Blood Updated:  12/09/18 0203     Troponin T <0.010 ng/mL     Narrative:       Troponin T Reference Ranges:  Less than 0.03 ng/mL:    Negative for  AMI  0.03 to 0.09 ng/mL:      Indeterminant for AMI  Greater than 0.09 ng/mL: Positive for AMI    CBC Auto Differential [[id number]]  (Abnormal) Collected:  12/09/18 0130    Specimen:  Blood Updated:  12/09/18 0211     WBC 13.84 10*3/mm3      RBC 5.27 10*6/mm3      Hemoglobin 15.4 g/dL      Hematocrit 44.1 %      MCV 83.7 fL      MCH 29.2 pg      MCHC 34.9 g/dL      RDW 12.9 %      RDW-SD 38.8 fl      MPV 9.0 fL      Platelets 332 10*3/mm3     Scan Slide [[id number]] Collected:  12/09/18 0130    Specimen:  Blood Updated:  12/09/18 0211     Scan Slide --     Comment: See Manual Differential Results       Manual Differential [[id number]]  (Abnormal) Collected:  12/09/18 0130    Specimen:  Blood Updated:  12/09/18 0211     Neutrophil % 53.0 %      Lymphocyte % 35.0 %      Monocyte % 4.0 %      Metamyelocyte % 2.0 %      Myelocyte % 3.0 %      Atypical Lymphocyte % 3.0 %      Neutrophils Absolute 7.34 10*3/mm3      Lymphocytes Absolute 4.84 10*3/mm3      Monocytes Absolute 0.55 10*3/mm3      RBC Morphology Normal     Smudge Cells Slight/1+     Large Platelets Slight/1+    TSH [[id number]]  (Normal) Collected:  12/09/18 0130    Specimen:  Blood Updated:  12/09/18 1056     TSH 2.270 mIU/mL     Magnesium [[id number]]  (Normal) Collected:  12/09/18 0130    Specimen:  Blood Updated:  12/09/18 1042     Magnesium 2.3 mg/dL     Urinalysis With Microscopic If Indicated (No Culture) - Urine, Clean Catch [[id number]]  (Abnormal) Collected:  12/09/18 0138    Specimen:  Urine, Clean Catch Updated:  12/09/18 0234     Color, UA Yellow     Appearance, UA Clear     pH, UA <=5.0     Specific Gravity, UA 1.027     Glucose, UA Negative     Ketones, UA Trace     Bilirubin, UA Negative     Blood, UA Negative     Protein, UA 30 mg/dL (1+)     Leuk Esterase, UA Negative     Nitrite, UA Negative     Urobilinogen, UA 0.2 E.U./dL    Urine Drug Screen - Urine, Clean Catch [[id number]]  (Normal) Collected:  12/09/18 0138    Specimen:  Urine, Clean Catch  Updated:  12/09/18 0245     Amphet/Methamphet, Screen Negative     Barbiturates Screen, Urine Negative     Benzodiazepine Screen, Urine Negative     Cocaine Screen, Urine Negative     Opiate Screen Negative     THC, Screen, Urine Negative     Methadone Screen, Urine Negative     Oxycodone Screen, Urine Negative    Narrative:       Negative Thresholds For Drugs Screened:     Amphetamines               500 ng/ml   Barbiturates               200 ng/ml   Benzodiazepines            100 ng/ml   Cocaine                    300 ng/ml   Methadone                  300 ng/ml   Opiates                    300 ng/ml   Oxycodone                  100 ng/ml   THC                        50 ng/ml    The Normal Value for all drugs tested is negative. This report includes final unconfirmed screening results to be used for medical treatment purposes only. Unconfirmed results must not be used for non-medical purposes such as employment or legal testing. Clinical consideration should be applied to any drug of abuse test, particulary when unconfirmed results are used.    Urinalysis, Microscopic Only - Urine, Clean Catch [[id number]] Collected:  12/09/18 0138    Specimen:  Urine, Clean Catch Updated:  12/09/18 0234     RBC, UA 0-2 /HPF      WBC, UA 0-2 /HPF      Bacteria, UA None Seen /HPF      Squamous Epithelial Cells, UA 0-2 /HPF      Hyaline Casts, UA 0-2 /LPF      Methodology Automated Microscopy          Assessment/Plan       New onset seizure (CMS/HCC)    Depression    Atrial fibrillation (CMS/HCC)    Frontal mass of brain      This patient was diagnosed with atrial fibrillation on admission.  He certainly could've had an embolus and a little bit of hemorrhage on the CT.    I've ordered an MRI scan.  The MRI will tell us much more.    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  There is no evidence of an acute hemorrhage.    Thankfully there is not a brain tumor.    Informed the family and the patient.  He will need a follow-up MRI in 3-4 months and would be happy to see him back in the office that time to confirm the diagnosis.

## 2018-12-09 NOTE — PLAN OF CARE
Problem: Fall Risk (Adult)  Goal: Identify Related Risk Factors and Signs and Symptoms  Outcome: Ongoing (interventions implemented as appropriate)      Problem: Patient Care Overview  Goal: Plan of Care Review  Outcome: Ongoing (interventions implemented as appropriate)   12/09/18 0558   Coping/Psychosocial   Plan of Care Reviewed With patient   Plan of Care Review   Progress no change   OTHER   Outcome Summary Pt admitted to unit for new onset seizure and Afib. VSS, no new seizure activity since admission. IV Keppra given in ER, PO Keppra ordered as well as consults called to cardiology, neurology, and neurosurgery. IV Zofran given for nausea. Will continue to monitor.      Goal: Individualization and Mutuality  Outcome: Ongoing (interventions implemented as appropriate)      Problem: Seizure Disorder/Epilepsy (Adult)  Goal: Signs and Symptoms of Listed Potential Problems Will be Absent, Minimized or Managed (Seizure Disorder/Epilepsy)  Outcome: Ongoing (interventions implemented as appropriate)      Problem: Pain, Acute (Adult)  Goal: Identify Related Risk Factors and Signs and Symptoms  Outcome: Ongoing (interventions implemented as appropriate)      Problem: Anxiety (Adult)  Goal: Identify Related Risk Factors and Signs and Symptoms  Outcome: Ongoing (interventions implemented as appropriate)

## 2018-12-09 NOTE — ED NOTES
Pt alert and oriented x3; has no complaints at this time. Reassurance given; call light in reach. Pts breathing even and unlabored. Pt appears in NAD at this time. Family at bedside.        Monica Garcia RN  12/09/18 0126

## 2018-12-10 ENCOUNTER — APPOINTMENT (OUTPATIENT)
Dept: CARDIOLOGY | Facility: HOSPITAL | Age: 43
End: 2018-12-10
Attending: INTERNAL MEDICINE

## 2018-12-10 ENCOUNTER — APPOINTMENT (OUTPATIENT)
Dept: NEUROLOGY | Facility: HOSPITAL | Age: 43
End: 2018-12-10
Attending: PSYCHIATRY & NEUROLOGY

## 2018-12-10 PROBLEM — Q28.3 CEREBRAL CAVERNOMA: Status: ACTIVE | Noted: 2018-12-10

## 2018-12-10 PROBLEM — G93.89 FRONTAL MASS OF BRAIN: Status: RESOLVED | Noted: 2018-12-09 | Resolved: 2018-12-10

## 2018-12-10 LAB
ANION GAP SERPL CALCULATED.3IONS-SCNC: 10.8 MMOL/L
AORTIC DIMENSIONLESS INDEX: 0.9 (DI)
BASOPHILS # BLD AUTO: 0.03 10*3/MM3 (ref 0–0.2)
BASOPHILS NFR BLD AUTO: 0.3 % (ref 0–1.5)
BH CV ECHO MEAS - AO MAX PG (FULL): 1.1 MMHG
BH CV ECHO MEAS - AO MAX PG: 4.6 MMHG
BH CV ECHO MEAS - AO MEAN PG (FULL): 0 MMHG
BH CV ECHO MEAS - AO MEAN PG: 2 MMHG
BH CV ECHO MEAS - AO ROOT AREA (BSA CORRECTED): 1.6
BH CV ECHO MEAS - AO ROOT AREA: 11.3 CM^2
BH CV ECHO MEAS - AO ROOT DIAM: 3.8 CM
BH CV ECHO MEAS - AO V2 MAX: 107 CM/SEC
BH CV ECHO MEAS - AO V2 MEAN: 63.1 CM/SEC
BH CV ECHO MEAS - AO V2 VTI: 17.2 CM
BH CV ECHO MEAS - AVA(I,A): 3.4 CM^2
BH CV ECHO MEAS - AVA(I,D): 3.4 CM^2
BH CV ECHO MEAS - AVA(V,A): 3.3 CM^2
BH CV ECHO MEAS - AVA(V,D): 3.3 CM^2
BH CV ECHO MEAS - BSA(HAYCOCK): 2.5 M^2
BH CV ECHO MEAS - BSA: 2.4 M^2
BH CV ECHO MEAS - BZI_BMI: 34.7 KILOGRAMS/M^2
BH CV ECHO MEAS - BZI_METRIC_HEIGHT: 182.9 CM
BH CV ECHO MEAS - BZI_METRIC_WEIGHT: 116.1 KG
BH CV ECHO MEAS - EDV(CUBED): 103.8 ML
BH CV ECHO MEAS - EDV(MOD-SP2): 74 ML
BH CV ECHO MEAS - EDV(MOD-SP4): 101 ML
BH CV ECHO MEAS - EDV(TEICH): 102.4 ML
BH CV ECHO MEAS - EF(CUBED): 86.7 %
BH CV ECHO MEAS - EF(MOD-BP): 60 %
BH CV ECHO MEAS - EF(MOD-SP2): 60.8 %
BH CV ECHO MEAS - EF(MOD-SP4): 57.4 %
BH CV ECHO MEAS - EF(TEICH): 80.3 %
BH CV ECHO MEAS - ESV(CUBED): 13.8 ML
BH CV ECHO MEAS - ESV(MOD-SP2): 29 ML
BH CV ECHO MEAS - ESV(MOD-SP4): 43 ML
BH CV ECHO MEAS - ESV(TEICH): 20.2 ML
BH CV ECHO MEAS - FS: 48.9 %
BH CV ECHO MEAS - IVS/LVPW: 1
BH CV ECHO MEAS - IVSD: 1 CM
BH CV ECHO MEAS - LAT PEAK E' VEL: 13 CM/SEC
BH CV ECHO MEAS - LV DIASTOLIC VOL/BSA (35-75): 42.7 ML/M^2
BH CV ECHO MEAS - LV MASS(C)D: 164.5 GRAMS
BH CV ECHO MEAS - LV MASS(C)DI: 69.5 GRAMS/M^2
BH CV ECHO MEAS - LV MAX PG: 3.5 MMHG
BH CV ECHO MEAS - LV MEAN PG: 2 MMHG
BH CV ECHO MEAS - LV SYSTOLIC VOL/BSA (12-30): 18.2 ML/M^2
BH CV ECHO MEAS - LV V1 MAX: 93.8 CM/SEC
BH CV ECHO MEAS - LV V1 MEAN: 60.7 CM/SEC
BH CV ECHO MEAS - LV V1 VTI: 15.5 CM
BH CV ECHO MEAS - LVIDD: 4.7 CM
BH CV ECHO MEAS - LVIDS: 2.4 CM
BH CV ECHO MEAS - LVLD AP2: 8.7 CM
BH CV ECHO MEAS - LVLD AP4: 8.2 CM
BH CV ECHO MEAS - LVLS AP2: 7.6 CM
BH CV ECHO MEAS - LVLS AP4: 7.4 CM
BH CV ECHO MEAS - LVOT AREA (M): 3.8 CM^2
BH CV ECHO MEAS - LVOT AREA: 3.8 CM^2
BH CV ECHO MEAS - LVOT DIAM: 2.2 CM
BH CV ECHO MEAS - LVPWD: 1 CM
BH CV ECHO MEAS - MED PEAK E' VEL: 10 CM/SEC
BH CV ECHO MEAS - MV DEC SLOPE: 544 CM/SEC^2
BH CV ECHO MEAS - MV DEC TIME: 0.2 SEC
BH CV ECHO MEAS - MV E MAX VEL: 93.1 CM/SEC
BH CV ECHO MEAS - MV MAX PG: 5.1 MMHG
BH CV ECHO MEAS - MV MEAN PG: 2 MMHG
BH CV ECHO MEAS - MV P1/2T MAX VEL: 116 CM/SEC
BH CV ECHO MEAS - MV P1/2T: 62.5 MSEC
BH CV ECHO MEAS - MV V2 MAX: 113 CM/SEC
BH CV ECHO MEAS - MV V2 MEAN: 55.1 CM/SEC
BH CV ECHO MEAS - MV V2 VTI: 23.6 CM
BH CV ECHO MEAS - MVA P1/2T LCG: 1.9 CM^2
BH CV ECHO MEAS - MVA(P1/2T): 3.5 CM^2
BH CV ECHO MEAS - MVA(VTI): 2.5 CM^2
BH CV ECHO MEAS - PA ACC TIME: 0.1 SEC
BH CV ECHO MEAS - PA MAX PG (FULL): 5 MMHG
BH CV ECHO MEAS - PA MAX PG: 6 MMHG
BH CV ECHO MEAS - PA PR(ACCEL): 33.1 MMHG
BH CV ECHO MEAS - PA V2 MAX: 122 CM/SEC
BH CV ECHO MEAS - PULM DIAS VEL: 50.9 CM/SEC
BH CV ECHO MEAS - PULM S/D: 0.58
BH CV ECHO MEAS - PULM SYS VEL: 29.5 CM/SEC
BH CV ECHO MEAS - PVA(V,A): 1.9 CM^2
BH CV ECHO MEAS - PVA(V,D): 1.9 CM^2
BH CV ECHO MEAS - QP/QS: 0.74
BH CV ECHO MEAS - RV MAX PG: 0.92 MMHG
BH CV ECHO MEAS - RV MEAN PG: 0 MMHG
BH CV ECHO MEAS - RV V1 MAX: 48 CM/SEC
BH CV ECHO MEAS - RV V1 MEAN: 30.2 CM/SEC
BH CV ECHO MEAS - RV V1 VTI: 8.9 CM
BH CV ECHO MEAS - RVOT AREA: 4.9 CM^2
BH CV ECHO MEAS - RVOT DIAM: 2.5 CM
BH CV ECHO MEAS - SI(AO): 82.4 ML/M^2
BH CV ECHO MEAS - SI(CUBED): 38 ML/M^2
BH CV ECHO MEAS - SI(LVOT): 24.9 ML/M^2
BH CV ECHO MEAS - SI(MOD-SP2): 19 ML/M^2
BH CV ECHO MEAS - SI(MOD-SP4): 24.5 ML/M^2
BH CV ECHO MEAS - SI(TEICH): 34.7 ML/M^2
BH CV ECHO MEAS - SV(AO): 195.1 ML
BH CV ECHO MEAS - SV(CUBED): 90 ML
BH CV ECHO MEAS - SV(LVOT): 58.9 ML
BH CV ECHO MEAS - SV(MOD-SP2): 45 ML
BH CV ECHO MEAS - SV(MOD-SP4): 58 ML
BH CV ECHO MEAS - SV(RVOT): 43.6 ML
BH CV ECHO MEAS - SV(TEICH): 82.2 ML
BH CV ECHO MEAS - TAPSE (>1.6): 1.6 CM2
BH CV ECHO MEASUREMENTS AVERAGE E/E' RATIO: 8.1
BH CV VAS BP LEFT ARM: NORMAL MMHG
BH CV XLRA - RV BASE: 3.7 CM
BH CV XLRA - TDI S': 13 CM/SEC
BUN BLD-MCNC: 11 MG/DL (ref 6–20)
BUN/CREAT SERPL: 15.3 (ref 7–25)
CALCIUM SPEC-SCNC: 8.7 MG/DL (ref 8.6–10.5)
CHLORIDE SERPL-SCNC: 102 MMOL/L (ref 98–107)
CO2 SERPL-SCNC: 26.2 MMOL/L (ref 22–29)
CREAT BLD-MCNC: 0.72 MG/DL (ref 0.76–1.27)
DEPRECATED RDW RBC AUTO: 41 FL (ref 37–54)
EOSINOPHIL # BLD AUTO: 0.19 10*3/MM3 (ref 0–0.7)
EOSINOPHIL NFR BLD AUTO: 1.7 % (ref 0.3–6.2)
ERYTHROCYTE [DISTWIDTH] IN BLOOD BY AUTOMATED COUNT: 12.9 % (ref 11.5–14.5)
GFR SERPL CREATININE-BSD FRML MDRD: 119 ML/MIN/1.73
GLUCOSE BLD-MCNC: 120 MG/DL (ref 65–99)
HCT VFR BLD AUTO: 48.6 % (ref 40.4–52.2)
HGB BLD-MCNC: 16 G/DL (ref 13.7–17.6)
IMM GRANULOCYTES # BLD: 0.04 10*3/MM3 (ref 0–0.03)
IMM GRANULOCYTES NFR BLD: 0.4 % (ref 0–0.5)
LEFT ATRIUM VOLUME INDEX: 17 ML/M2
LEFT ATRIUM VOLUME: 38 CM3
LYMPHOCYTES # BLD AUTO: 4.96 10*3/MM3 (ref 0.9–4.8)
LYMPHOCYTES NFR BLD AUTO: 44.1 % (ref 19.6–45.3)
MAXIMAL PREDICTED HEART RATE: 176 BPM
MCH RBC QN AUTO: 28.9 PG (ref 27–32.7)
MCHC RBC AUTO-ENTMCNC: 32.9 G/DL (ref 32.6–36.4)
MCV RBC AUTO: 87.7 FL (ref 79.8–96.2)
MONOCYTES # BLD AUTO: 0.7 10*3/MM3 (ref 0.2–1.2)
MONOCYTES NFR BLD AUTO: 6.2 % (ref 5–12)
NEUTROPHILS # BLD AUTO: 5.33 10*3/MM3 (ref 1.9–8.1)
NEUTROPHILS NFR BLD AUTO: 47.3 % (ref 42.7–76)
PLATELET # BLD AUTO: 320 10*3/MM3 (ref 140–500)
PMV BLD AUTO: 8.7 FL (ref 6–12)
POTASSIUM BLD-SCNC: 3.9 MMOL/L (ref 3.5–5.2)
RBC # BLD AUTO: 5.54 10*6/MM3 (ref 4.6–6)
SODIUM BLD-SCNC: 139 MMOL/L (ref 136–145)
STRESS TARGET HR: 150 BPM
TROPONIN T SERPL-MCNC: <0.01 NG/ML (ref 0–0.03)
WBC NRBC COR # BLD: 11.25 10*3/MM3 (ref 4.5–10.7)

## 2018-12-10 PROCEDURE — 80048 BASIC METABOLIC PNL TOTAL CA: CPT | Performed by: INTERNAL MEDICINE

## 2018-12-10 PROCEDURE — 99232 SBSQ HOSP IP/OBS MODERATE 35: CPT | Performed by: NURSE PRACTITIONER

## 2018-12-10 PROCEDURE — 93306 TTE W/DOPPLER COMPLETE: CPT

## 2018-12-10 PROCEDURE — 93306 TTE W/DOPPLER COMPLETE: CPT | Performed by: INTERNAL MEDICINE

## 2018-12-10 PROCEDURE — 95819 EEG AWAKE AND ASLEEP: CPT | Performed by: PSYCHIATRY & NEUROLOGY

## 2018-12-10 PROCEDURE — 63710000001 DIPHENHYDRAMINE PER 50 MG: Performed by: INTERNAL MEDICINE

## 2018-12-10 PROCEDURE — 85025 COMPLETE CBC W/AUTO DIFF WBC: CPT | Performed by: INTERNAL MEDICINE

## 2018-12-10 PROCEDURE — 25010000002 LEVETIRACETAM IN NACL 0.82% 500 MG/100ML SOLUTION: Performed by: INTERNAL MEDICINE

## 2018-12-10 PROCEDURE — 84484 ASSAY OF TROPONIN QUANT: CPT | Performed by: INTERNAL MEDICINE

## 2018-12-10 PROCEDURE — 95816 EEG AWAKE AND DROWSY: CPT

## 2018-12-10 PROCEDURE — 99233 SBSQ HOSP IP/OBS HIGH 50: CPT | Performed by: NURSE PRACTITIONER

## 2018-12-10 RX ORDER — DIPHENHYDRAMINE HCL 25 MG
25 CAPSULE ORAL ONCE
Status: COMPLETED | OUTPATIENT
Start: 2018-12-10 | End: 2018-12-10

## 2018-12-10 RX ORDER — LEVETIRACETAM 500 MG/1
500 TABLET ORAL EVERY 12 HOURS SCHEDULED
Status: DISCONTINUED | OUTPATIENT
Start: 2018-12-10 | End: 2018-12-12 | Stop reason: HOSPADM

## 2018-12-10 RX ORDER — ACETAMINOPHEN 325 MG/1
650 TABLET ORAL ONCE
Status: COMPLETED | OUTPATIENT
Start: 2018-12-10 | End: 2018-12-10

## 2018-12-10 RX ADMIN — LEVETIRACETAM 500 MG: 500 TABLET, FILM COATED ORAL at 20:24

## 2018-12-10 RX ADMIN — METOPROLOL TARTRATE 12.5 MG: 25 TABLET ORAL at 20:23

## 2018-12-10 RX ADMIN — SODIUM CHLORIDE, PRESERVATIVE FREE 3 ML: 5 INJECTION INTRAVENOUS at 20:24

## 2018-12-10 RX ADMIN — METOPROLOL TARTRATE 12.5 MG: 25 TABLET ORAL at 15:27

## 2018-12-10 RX ADMIN — SODIUM CHLORIDE, PRESERVATIVE FREE 3 ML: 5 INJECTION INTRAVENOUS at 08:58

## 2018-12-10 RX ADMIN — PANTOPRAZOLE SODIUM 40 MG: 40 TABLET, DELAYED RELEASE ORAL at 08:58

## 2018-12-10 RX ADMIN — LEVETIRACETAM 500 MG: 5 INJECTION INTRAVENOUS at 08:58

## 2018-12-10 RX ADMIN — DIPHENHYDRAMINE HYDROCHLORIDE 25 MG: 25 CAPSULE ORAL at 22:13

## 2018-12-10 RX ADMIN — ESCITALOPRAM 10 MG: 10 TABLET, FILM COATED ORAL at 08:58

## 2018-12-10 RX ADMIN — ACETAMINOPHEN 650 MG: 325 TABLET, FILM COATED ORAL at 22:13

## 2018-12-10 NOTE — PLAN OF CARE
Problem: Fall Risk (Adult)  Goal: Identify Related Risk Factors and Signs and Symptoms  Outcome: Ongoing (interventions implemented as appropriate)    Goal: Absence of Fall  Outcome: Ongoing (interventions implemented as appropriate)      Problem: Patient Care Overview  Goal: Plan of Care Review  Outcome: Ongoing (interventions implemented as appropriate)   12/10/18 7116   OTHER   Outcome Summary VSS. No seizure activity noted. EEG and ECHO done today. IV keppra swithed to PO. Waiting for neuro's approval regarding anticoagulation plan regarding afib. Will continue to monitor.     Goal: Individualization and Mutuality  Outcome: Ongoing (interventions implemented as appropriate)    Goal: Interprofessional Rounds/Family Conf  Outcome: Ongoing (interventions implemented as appropriate)      Problem: Seizure Disorder/Epilepsy (Adult)  Goal: Signs and Symptoms of Listed Potential Problems Will be Absent, Minimized or Managed (Seizure Disorder/Epilepsy)  Outcome: Ongoing (interventions implemented as appropriate)      Problem: Pain, Acute (Adult)  Goal: Identify Related Risk Factors and Signs and Symptoms  Outcome: Ongoing (interventions implemented as appropriate)    Goal: Acceptable Pain Control/Comfort Level  Outcome: Ongoing (interventions implemented as appropriate)      Problem: Anxiety (Adult)  Goal: Identify Related Risk Factors and Signs and Symptoms  Outcome: Ongoing (interventions implemented as appropriate)    Goal: Reduction/Resolution  Outcome: Ongoing (interventions implemented as appropriate)

## 2018-12-10 NOTE — PROGRESS NOTES
Discharge Planning Assessment  [facility]     Patient Name: Austin Orta  MRN: [id number]  Today's Date: 12/10/2018    Admit Date: 12/9/2018    Discharge Needs Assessment     Row Name 12/10/18 1208       Living Environment    Lives With  spouse    Current Living Arrangements  home/apartment/condo    Primary Care Provided by  self    Provides Primary Care For  no one    Family Caregiver if Needed  spouse    Family Caregiver Names  -- Ann-Marie Orta [phone]    Quality of Family Relationships  involved;helpful    Able to Return to Prior Arrangements  yes       Resource/Environmental Concerns    Resource/Environmental Concerns  none    Transportation Concerns  car, none       Transition Planning    Patient/Family Anticipates Transition to  home    Patient/Family Anticipated Services at Transition  none    Transportation Anticipated  family or friend will provide       Discharge Needs Assessment    Concerns to be Addressed  discharge planning    Equipment Currently Used at Home  none    Anticipated Changes Related to Illness  none    Equipment Needed After Discharge  none    Offered/Gave Vendor List  yes pt declined    Discharge Coordination/Progress  Home with family, no needs identified. -TOÑITO Arora        Discharge Plan     Row Name 12/10/18 120       Plan    Plan  Home with family, no needs identified. -TOÑITO Arora    Patient/Family in Agreement with Plan  yes    Plan Comments  CCP met with pt and family at bedside. CCP given permission to speak in front of visitors. Explained role of CCP, verified face sheet, and discussed d/c planning needs. Pt lives with spouse in a single story home with 3 steps to enter. Pt is independent with all ADL's. Pt uses no DME. Pt denies medication affordability issues or issues with reliable transportation. Pt has no history of SNF/HH services. Pt's plan upon discharge is to return home with the support of his family. Pt's spouse will provide  transportation upon discharge. CCP to follow for any d/c planning needs that may arise. -TOÑITO Arora        Destination      No service coordination in this encounter.      Durable Medical Equipment      No service coordination in this encounter.      Dialysis/Infusion      No service coordination in this encounter.      Home Medical Care      No service coordination in this encounter.      Community Resources      No service coordination in this encounter.          Demographic Summary     Row Name 12/10/18 120       General Information    Admission Type  inpatient    Arrived From  home    Referral Source  admission list    Reason for Consult  discharge planning    Preferred Language  English     Used During This Interaction  no        Functional Status     Row Name 12/10/18 1207       Functional Status    Usual Activity Tolerance  good    Current Activity Tolerance  good       Functional Status, IADL    Medications  independent    Meal Preparation  independent    Housekeeping  independent    Laundry  independent    Shopping  independent       Mental Status    General Appearance WDL  WDL       Mental Status Summary    Recent Changes in Mental Status/Cognitive Functioning  no changes        Psychosocial    No documentation.       Abuse/Neglect    No documentation.       Legal    No documentation.       Substance Abuse    No documentation.       Patient Forms    No documentation.           TOÑITO Arora

## 2018-12-10 NOTE — PLAN OF CARE
Problem: Fall Risk (Adult)  Goal: Identify Related Risk Factors and Signs and Symptoms  Outcome: Ongoing (interventions implemented as appropriate)      Problem: Patient Care Overview  Goal: Plan of Care Review  Outcome: Ongoing (interventions implemented as appropriate)   12/10/18 0536   Coping/Psychosocial   Plan of Care Reviewed With patient   Plan of Care Review   Progress no change   OTHER   Outcome Summary VSS, no seizure activity, IV Keppra given, new IV placed. Tylenol PM for sleep per pt request. No other complaints. Echo and EEG today. Will CTM.        Problem: Seizure Disorder/Epilepsy (Adult)  Goal: Signs and Symptoms of Listed Potential Problems Will be Absent, Minimized or Managed (Seizure Disorder/Epilepsy)  Outcome: Ongoing (interventions implemented as appropriate)      Problem: Pain, Acute (Adult)  Goal: Identify Related Risk Factors and Signs and Symptoms  Outcome: Ongoing (interventions implemented as appropriate)      Problem: Anxiety (Adult)  Goal: Identify Related Risk Factors and Signs and Symptoms  Outcome: Ongoing (interventions implemented as appropriate)

## 2018-12-10 NOTE — PROGRESS NOTES
DOS: 12/10/2018  NAME: Austin Orta  : 1975  PCP: James Epley APRN  CC: new onset seizure    Subjective: States he feels better today.  Denies headaches, sitting up in the recliner. Wants to know when he can play volleyball again.    Pt seen in follow up, however new to the examiner.    Objective:  Vital Signs:  Vitals:    18 1619 18 1919 12/10/18 0317 12/10/18 0741   BP: 108/61 115/78 109/76 125/88   BP Location:  Left arm Left arm Left arm   Patient Position:  Lying Lying Lying   Pulse: 91 105 85 103   Resp:  18 18 20   Temp: 98.4 °F (36.9 °C) 97.5 °F (36.4 °C) 97.2 °F (36.2 °C) 97.5 °F (36.4 °C)   TempSrc: Oral Oral Oral Oral   SpO2:   94%    Weight:       Height:         Current Facility-Administered Medications:   •  acetaminophen (TYLENOL) tablet 650 mg, 650 mg, Oral, Q4H PRN, Samir Mathews MD  •  escitalopram (LEXAPRO) tablet 10 mg, 10 mg, Oral, Daily, Samir Mathews MD, 10 mg at 12/10/18 0858  •  HYDROcodone-acetaminophen (NORCO) 5-325 MG per tablet 1 tablet, 1 tablet, Oral, Q6H PRN, Samir Mathews MD, 1 tablet at 18 1742  •  levETIRAcetam in NaCl 0.82% (KEPPRA) IVPB 500 mg, 500 mg, Intravenous, Q12H, Samir Mathews MD, 500 mg at 12/10/18 0858  •  ondansetron (ZOFRAN) injection 4 mg, 4 mg, Intravenous, Q6H PRN, Jairo Siegel MD, 4 mg at 18 0523  •  pantoprazole (PROTONIX) EC tablet 40 mg, 40 mg, Oral, QAM, Samir Mathews MD, 40 mg at 12/10/18 0858  •  [COMPLETED] Insert peripheral IV, , , Once **AND** sodium chloride 0.9 % flush 10 mL, 10 mL, Intravenous, PRN, Gregory Berkowitz MD  •  sodium chloride 0.9 % flush 3 mL, 3 mL, Intravenous, Q12H, Samir Mathews MD, 3 mL at 12/10/18 0858  •  sodium chloride 0.9 % flush 3-10 mL, 3-10 mL, Intravenous, PRN, Samir Mathews MD    No current facility-administered medications on file prior to encounter.      Current Outpatient Medications on File  Prior to Encounter   Medication Sig   • escitalopram (LEXAPRO) 10 MG tablet Take 1 tablet by mouth Daily.   • fluticasone (FLONASE SENSIMIST) 27.5 MCG/SPRAY nasal spray 2 sprays into each nostril Daily.   • levocetirizine (XYZAL) 5 MG tablet Take 1 tablet by mouth Every Evening.   • naproxen (NAPROSYN) 500 MG tablet Take 1 tablet by mouth 2 (Two) Times a Day With Meals. As needed for pain   • omeprazole (priLOSEC) 20 MG capsule Take 1 capsule by mouth Daily.   • zolpidem (AMBIEN) 5 MG tablet Take 2.5 mg by mouth At Night As Needed.   • azelastine (ASTELIN) 0.1 % nasal spray 1-2 sprays twice daily as needed for nasal congestion   • MethylPREDNISolone (MEDROL, RAFA,) 4 MG tablet Take as directed on package instructions.   • nitroglycerin (NITRODUR) 0.1 MG/HR patch Use as directed   • tamsulosin (FLOMAX) 0.4 MG capsule 24 hr capsule Take 1 capsule by mouth Every Night.   • traZODone (DESYREL) 50 MG tablet Take 1 tablet by mouth Every Night. As needed for insomnia     PRN meds  •  acetaminophen  •  HYDROcodone-acetaminophen  •  ondansetron  •  [COMPLETED] Insert peripheral IV **AND** sodium chloride  •  sodium chloride    General appearance: well groomed, alert and cooperative, sitting up in recliner  HEENT: Normocephalic, atraumatic, no mass or tenderness  COR: RRR  Resp: Even and unlabored, clear to auscultation in all 4 lung fields  Extremities: Equal pulses, no edema  Skin: warm, dry, no rashes    Neurological:   MS: AO. Language normal. No neglect. Higher integrative function normal  CN: II-XII normal  Motor: 5/5 strength in all 4 ext., normal tone  Reflexes: 2+ in all 4 ext.  Coordination: Normal finger to nose test, no dysmetria    Laboratory Results:  Lab Results   Component Value Date    GLUCOSE 120 (H) 12/10/2018    BUN 11 12/10/2018    CREATININE 0.72 (L) 12/10/2018    EGFRIFNONA 119 12/10/2018    EGFRIFAFRI 131 07/26/2018    BCR 15.3 12/10/2018    K 3.9 12/10/2018    CO2 26.2 12/10/2018    CALCIUM 8.7  12/10/2018    PROTENTOTREF 7.3 07/26/2018    ALBUMIN 3.70 12/09/2018    LABIL2 1.8 07/26/2018    AST 24 12/09/2018    ALT 30 12/09/2018     Lab Results   Component Value Date    WBC 11.25 (H) 12/10/2018    HGB 16.0 12/10/2018    HCT 48.6 12/10/2018    MCV 87.7 12/10/2018     12/10/2018     Lab Results   Component Value Date    TSH 2.270 12/09/2018     No results found for: [id number]  No results found for: HGBA1C  Lab Results   Component Value Date    CHLPL 164 07/26/2018    CHLPL 191 06/21/2016     Lab Results   Component Value Date    TRIG 159 (H) 07/26/2018    TRIG 139 06/21/2016     Lab Results   Component Value Date    HDL 39 (L) 07/26/2018    HDL 49 06/21/2016     Lab Results   Component Value Date    LDL 93 07/26/2018     (H) 06/21/2016     Pain Management Panel     Pain Management Panel Latest Ref Rng & Units 12/9/2018    BARBITURATES SCREEN Negative Negative    BENZODIAZEPINE SCREEN, URINE Negative Negative    COCAINE SCREEN, URINE Negative Negative    METHADONE SCREEN, URINE Negative Negative            Imaging Interpretation and Review:  Ct Head Without Contrast    Result Date: 12/9/2018  1. 11 mm hyperdense focus medially in the left frontal lobe suspicious for small mass. Follow-up as above.         This report was finalized on 12/9/2018 2:05 AM by Rafita Lantigua M.D.      Mri Brain With & Without Contrast    Result Date: 12/9/2018  13 mm lesion in the left parafalcine frontal lobe is thought to most likely represent a cavernoma. Consider correlation with any prior outside MRI scans. If none are available, short-term follow-up in approximately 4-6 months recommended.      12/10 EEG Impression:  Normal EEG during awake and light sleep states.    2D echo Interpretation Summary:  · Atrial fibrillation was the predominant rhythm observed during the procedure.  · Calculated EF = 60%.  · Left ventricular systolic function is normal.  · Normal valvular structure and function    Impression: This  "is a 42yo male with a PMH of allegies, anxiety, insomnia, joint pain, who presented to the hospital after his wife found him on the couch \"shaking\".  Wife noted patient rolled over on their couch and started having jerking motions. He was unarousable at this time and she did note the episode lasted less than a minute.  Afterwards patient was noted to be fatigued, confused, and agitated.  Wife called EMS and patient reportedly had another seizure en-route to the hospital.  CT head showing a left frontal lobe lesion, suspicious of a small mass.  MRI Brain confirmed patient has a 13mm lesion in the left parafalcine frontal lobe that likely represents a cavernoma.  This would explain his new onset seizure.  Neurosurgery is following the patient.  We will continue to treat patient for his seizures, and await neurosurgery recommendation for cavernoma treatment.     Assessment/Plan:  New onset seizure  Frontal mass of brain  Atrial Fibrillation with RVR   Continue Keppra 500mg q12, will change to PO. Will likely need to be on anticonvulsant lifelong.  Will await Neurosurgery recs for ? Cavernoma.  Will need to follow up in neurology clinic in 3 months with Italia MIKE.  Seizure Precautions: Patient is not to drive for at least 3 months until cleared by a physician, operate heavy machinery, take tub baths, swim unattended, climb heights, or perform any other activities that can bring harm to himself/herself or others during an episode of altered awareness.  Cardiology following for afib RVR.    Plans discussed with patient, patient family, and Dr. Martini.  RASHID Rodriguez    "

## 2018-12-10 NOTE — PROGRESS NOTES
" LOS: 1 day     Name: Austin Orta  Age: 43 y.o.  Sex: male  :  1975  MRN: [id number]         Primary Care Physician: Epley, James, APRN    Subjective   Subjective  Feels well today with no new complaints.  No additional seizure activity.  Feels very much relieved and the fact that his brain MRI showed a cavernous malformation and no evidence of malignancy    Objective   Vital Signs  Temp:  [97.2 °F (36.2 °C)-98.4 °F (36.9 °C)] 97.5 °F (36.4 °C)  Heart Rate:  [] 103  Resp:  [18-20] 20  BP: (108-125)/(61-88) 125/88  Body mass index is 34.72 kg/m².    Objective:  General Appearance:  Comfortable and in no acute distress.    Vital signs: (most recent): Blood pressure 125/88, pulse 103, temperature 97.5 °F (36.4 °C), temperature source Oral, resp. rate 20, height 182.9 cm (72\"), weight 116 kg (256 lb), SpO2 94 %.    Lungs:  Normal effort and normal respiratory rate.    Heart: Normal rate.  Regular rhythm.    Abdomen: Abdomen is soft.  Bowel sounds are normal.   There is no abdominal tenderness.     Extremities: There is no dependent edema or local swelling.    Neurological: Patient is alert and oriented to person, place and time.    Skin:  Warm and dry.              Results Review:       I reviewed the patient's new clinical results.    Results from last 7 days   Lab Units  12/10/18   0520  18   0130   WBC 10*3/mm3  11.25*  13.84*   HEMOGLOBIN g/dL  16.0  15.4   PLATELETS 10*3/mm3  320  332     Results from last 7 days   Lab Units  12/10/18   0520  18   0130   SODIUM mmol/L  139  140   POTASSIUM mmol/L  3.9  4.4   CHLORIDE mmol/L  102  102   CO2 mmol/L  26.2  25.4   BUN mg/dL  11  20   CREATININE mg/dL  0.72*  0.91   CALCIUM mg/dL  8.7  8.5*   GLUCOSE mg/dL  120*  116*     Results from last 7 days   Lab Units  18   0130   INR   0.97             Scheduled Meds:     escitalopram 10 mg Oral Daily   levETIRAcetam 500 mg Intravenous Q12H   pantoprazole 40 mg Oral QAM   sodium chloride 3 mL " Intravenous Q12H     PRN Meds:   •  acetaminophen  •  HYDROcodone-acetaminophen  •  ondansetron  •  [COMPLETED] Insert peripheral IV **AND** sodium chloride  •  sodium chloride  Continuous Infusions:       Assessment/Plan   Active Hospital Problems    Diagnosis Date Noted   • **New onset seizure (CMS/HCC) [R56.9] 12/09/2018   • Cerebral cavernoma [Q28.3] 12/10/2018   • Atrial fibrillation (CMS/HCC) [I48.91] 12/09/2018   • Depression [F32.9] 07/26/2018      Resolved Hospital Problems   No resolved problems to display.       Assessment & Plan    - Brain MRI reveals a frontal cavernous malformation that will need four-month follow-up.  Neurosurgery following  - Continue Keppra.  EEG was performed this morning with results pending.  Await additional input from neurology regarding definitive antiepileptic plan  - Additional AC mgmt per cardiology.  Does not appear he will need surgery.  Discussed risks versus benefits of anticoagulation for atrial fibrillation in the setting of cavernous malformation and bleeding.  Heart rate is in the 90s to low 100s.  We'll start low-dose beta blocker.  Echocardiogram to be performed.  TSH and other electrolytes are WNL.    Dispo  Discharge to home when okay with all    Samir Mathews MD  [facility]  12/10/18  11:06 AM     Addendum: Discussed with cardiology.  The main question from the standpoint of his atrial fibrillation is do we allow him to remain in A. fib and place him on aspirin only or start oral anticoagulation and cardiovert him in about 4 weeks.  We will need input from neurosurgery regarding allowances for anticoagulation in the setting of the cavernous malformation.

## 2018-12-11 ENCOUNTER — APPOINTMENT (OUTPATIENT)
Dept: NUCLEAR MEDICINE | Facility: HOSPITAL | Age: 43
End: 2018-12-11

## 2018-12-11 ENCOUNTER — TELEPHONE (OUTPATIENT)
Dept: NEUROSURGERY | Facility: CLINIC | Age: 43
End: 2018-12-11

## 2018-12-11 DIAGNOSIS — Q28.3 CAVERNOUS MALFORMATION: Primary | ICD-10-CM

## 2018-12-11 PROCEDURE — A9500 TC99M SESTAMIBI: HCPCS | Performed by: INTERNAL MEDICINE

## 2018-12-11 PROCEDURE — 93018 CV STRESS TEST I&R ONLY: CPT | Performed by: INTERNAL MEDICINE

## 2018-12-11 PROCEDURE — 93017 CV STRESS TEST TRACING ONLY: CPT

## 2018-12-11 PROCEDURE — 78452 HT MUSCLE IMAGE SPECT MULT: CPT

## 2018-12-11 PROCEDURE — 99232 SBSQ HOSP IP/OBS MODERATE 35: CPT | Performed by: NURSE PRACTITIONER

## 2018-12-11 PROCEDURE — 78452 HT MUSCLE IMAGE SPECT MULT: CPT | Performed by: INTERNAL MEDICINE

## 2018-12-11 PROCEDURE — 63710000001 DIPHENHYDRAMINE PER 50 MG: Performed by: HOSPITALIST

## 2018-12-11 PROCEDURE — 0 TECHNETIUM SESTAMIBI: Performed by: INTERNAL MEDICINE

## 2018-12-11 RX ORDER — DIPHENHYDRAMINE HCL 25 MG
25 CAPSULE ORAL ONCE
Status: COMPLETED | OUTPATIENT
Start: 2018-12-11 | End: 2018-12-11

## 2018-12-11 RX ADMIN — APIXABAN 5 MG: 5 TABLET, FILM COATED ORAL at 16:43

## 2018-12-11 RX ADMIN — SODIUM CHLORIDE, PRESERVATIVE FREE 3 ML: 5 INJECTION INTRAVENOUS at 08:08

## 2018-12-11 RX ADMIN — LEVETIRACETAM 500 MG: 500 TABLET, FILM COATED ORAL at 20:58

## 2018-12-11 RX ADMIN — METOPROLOL TARTRATE 12.5 MG: 25 TABLET ORAL at 13:36

## 2018-12-11 RX ADMIN — SODIUM CHLORIDE, PRESERVATIVE FREE 3 ML: 5 INJECTION INTRAVENOUS at 20:59

## 2018-12-11 RX ADMIN — APIXABAN 5 MG: 5 TABLET, FILM COATED ORAL at 20:58

## 2018-12-11 RX ADMIN — LEVETIRACETAM 500 MG: 500 TABLET, FILM COATED ORAL at 08:07

## 2018-12-11 RX ADMIN — TECHNETIUM TC 99M SESTAMIBI 1 DOSE: 1 INJECTION INTRAVENOUS at 12:45

## 2018-12-11 RX ADMIN — ACETAMINOPHEN 650 MG: 325 TABLET, FILM COATED ORAL at 22:16

## 2018-12-11 RX ADMIN — METOPROLOL TARTRATE 25 MG: 25 TABLET ORAL at 20:58

## 2018-12-11 RX ADMIN — DIPHENHYDRAMINE HYDROCHLORIDE 25 MG: 25 CAPSULE ORAL at 22:16

## 2018-12-11 RX ADMIN — METOPROLOL TARTRATE 12.5 MG: 25 TABLET ORAL at 08:07

## 2018-12-11 RX ADMIN — PANTOPRAZOLE SODIUM 40 MG: 40 TABLET, DELAYED RELEASE ORAL at 08:08

## 2018-12-11 RX ADMIN — ESCITALOPRAM 10 MG: 10 TABLET, FILM COATED ORAL at 08:07

## 2018-12-11 NOTE — TELEPHONE ENCOUNTER
----- Message from RASHID Cooper sent at 12/11/2018 10:55 AM EST -----  Regarding: f/u  Pt seen for cavernous malformation  by Dr. Alexis Morin on December 9, 2018. Need follow up in 3-4 month(s)  with imaging MRI brain. Ok to see NP.

## 2018-12-11 NOTE — PROGRESS NOTES
Patient Counseling    Austin Orta is a new start to Eliquis therapy.     Discussed Eliquis indication, dosing, and administration. Also enforced the importance of taking Eliquis as instructed and at the same time every day and expressed understanding of this fact.    We also discussed what to do about a missed dose. Explained possible side effects of therapy, including increased risk of bleeding, s/sx of bleeding and s/sx of any additional clots/PE/CVA.     Reviewed storage.     He expressed understanding of the information provided and has no additional questions at this time.    Karolina Braun, PharmD, BCPS

## 2018-12-11 NOTE — PROGRESS NOTES
[facility]  Cardiology Progress Note    Patient Identification:  Name: Austin Orta  Age: 43 y.o.  Sex: male  :  1975  MRN: [id number]                 Follow Up / Chief Complaint:      Interval History: 43-year-old male with a significant medical issues, has been admitted to the hospital with seizure activity, denies any chest pains or tightness in the chest, was found to have atrial fibrillation with uncontrolled rate      Subjective: denies palps, syncope, or near syncope. No further chest pain/ heaviness last 24 hours.  HR better controlled.       Objective:    Temp:  [97.3 °F (36.3 °C)-98.3 °F (36.8 °C)] 98.1 °F (36.7 °C)  Heart Rate:  [] 97  Resp:  [16-20] 16  BP: (107-137)/() 127/97   SpO2:  [96 %-99 %] 96 %      Past Medical History:  Past Medical History:   Diagnosis Date   • Allergic     seasonal   • Anxiety    • Anxiety and depression    • Depression    • GERD (gastroesophageal reflux disease)    • History of prostatitis    • Injury of right heel 2018   • Insomnia    • Joint pain    • Kidney stones 2017,  also   • Left rotator cuff tear    • Prostatitis    • Right rotator cuff tear      Past Surgical History:  Past Surgical History:   Procedure Laterality Date   • CHOLECYSTECTOMY     • INGUINAL HERNIA REPAIR     • KIDNEY STONE SURGERY     • TONSILLECTOMY     • VASECTOMY          Social History:   Social History     Tobacco Use   • Smoking status: Never Smoker   • Smokeless tobacco: Never Used   Substance Use Topics   • Alcohol use: No      Family History:  Family History   Problem Relation Age of Onset   • Cancer Mother    • Hypertension Mother    • Nephrolithiasis Sister    • Heart attack Maternal Grandmother    • Cancer Maternal Grandmother           Allergies:  Allergies   Allergen Reactions   • Ciprofloxacin Nausea And Vomiting and Myalgia     Scheduled Meds:    apixaban 5 mg Q12H   escitalopram 10 mg Daily   levETIRAcetam 500 mg Q12H   metoprolol  tartrate 5 mg Once   metoprolol tartrate 12.5 mg Once   metoprolol tartrate 25 mg Q12H   pantoprazole 40 mg QAM   sodium chloride 3 mL Q12H           INTAKE AND OUTPUT:    Intake/Output Summary (Last 24 hours) at 2018 1241  Last data filed at 12/10/2018 2021  Gross per 24 hour   Intake 600 ml   Output --   Net 600 ml       Review of Systems:   GI: denies abd pain or n/v  Cardiac: no chest pain, denies palps  Pulmonary: + snorer, denies cough.    Constitutional:  Temp:  [97.3 °F (36.3 °C)-98.3 °F (36.8 °C)] 98.1 °F (36.7 °C)  Heart Rate:  [] 97  Resp:  [16-20] 16  BP: (107-137)/() 127/97   SpO2:  [96 %-99 %] 96 %    Physical Exam:  General:  Appears in no acute distress, obese  Eyes: non icteric, no conjunctival discharge  HEENT:  No JVD. Thyroid not visibly enlarged. No mucosal pallor or cyanosis  Respiratory: Respirations regular and unlabored at rest. BBS with good air entry in all fields. No crackles, rubs or wheezes auscultated  Cardiovascular: S1S2 Regular rate and rhythm. No murmur, rub or gallop. No carotid bruits. DP/PT pulses 2+  . No pretibial pitting edema  Gastrointestinal: Abdomen soft, flat, non tender. Bowel sounds present. No hepatosplenomegaly. No ascites  Musculoskeletal: [place] x4. No abnormal movements  Extremities: No digital clubbing or cyanosis  Skin: Color pink. Skin warm and dry to touch. No rashes    Neuro: AAO x3 CN II-XII grossly intact  Psych: Mood and affect normal, pleasant and cooperative    Cardiographics    Telemetry:     AFib rate controlled                        EC/9/18            Echocardiogram:     Interpretation Summary 12/10/18    · Atrial fibrillation was the predominant rhythm observed during the procedure.  · Calculated EF = 60%.  · Left ventricular systolic function is normal.  · Normal valvular structure and function            Lab Review   Results from last 7 days   Lab Units  12/10/18   0520  18   0130   TROPONIN T ng/mL  <0.010  <0.010      Results from last 7 days   Lab Units  12/09/18   0130   MAGNESIUM mg/dL  2.3     Results from last 7 days   Lab Units  12/10/18   0520   SODIUM mmol/L  139   POTASSIUM mmol/L  3.9   BUN mg/dL  11   CREATININE mg/dL  0.72*   CALCIUM mg/dL  8.7       Results from last 7 days   Lab Units  12/10/18   0520  12/09/18   0130   WBC 10*3/mm3  11.25*  13.84*   HEMOGLOBIN g/dL  16.0  15.4   HEMATOCRIT %  48.6  44.1   PLATELETS 10*3/mm3  320  332     Results from last 7 days   Lab Units  12/09/18   0130   INR   0.97     MRI of Brain 12/9/18  IMPRESSION:  13 mm lesion in the left parafalcine frontal lobe is thought  to most likely represent a cavernoma. Consider correlation with any  prior outside MRI scans.  2. No other significant findings are noted.      Active Hospital Problems     Diagnosis Date Noted   • **New onset seizure (CMS/HCC) [R56.9] 12/09/2018   • Cerebral cavernoma [Q28.3] 12/10/2018   • Atrial fibrillation (CMS/HCC) [I48.91] 12/09/2018   • Depression [F32.9]      CV Plan:   New onset atrial fibrillation:  Rate controlled.  TSH wnl. K+ and mag normal on admit. Echo showing normal LVSF   CHADS-VASc 1,  accounting for some issues with HTN during this stay. No history of HTN.  MRI revealed Cerebral cavernoma and Neurosurgery states no contraindications to anticoagulation.  Will start eliquis 5 mg bid.  Increase metoprolol from 12.5 mg to 25 mg BID to help with BP and HR rate control.  Monitor.        Chest pain: had episode yesterday of chest pain.  Hx of early onset CAD on mothers side.  Consider ischemia workup if ok with neurosurgery.  Trop neg x 2 on admit. 12 lead EKG showing borderline t wave abnormalities in inferior leads.   Recommend Nuc stress test.  Discussed risks and benefits of myocardial perfusion testing with him, mother and wife in room.  Pt agreeable to move forward with testing.  Will set up for 2 day nuc stress.  Rest images today then NPO after MN for stress portion and no caffeine starting  "now.     Cerebral Cavernoma: plans for f/u as outpt.  Neurosurgery reports no contraindications to anticoagulation from their standpoint.     Possible sleep d/o breathing: Noted 2.6 second pause while sleeping last night.  + snorer and body habitus concerning for potential sleep d/o breathing.  Work up as outpt recommended.  CV risks of untreated sleep apnea discussed with pt and family.       Labs/tests ordered for am: 12 lead EKG.       )12/11/2018  RASHID White/Transcription:   \"Dictated utilizing Dragon dictation\".   "

## 2018-12-11 NOTE — PLAN OF CARE
Problem: Fall Risk (Adult)  Goal: Absence of Fall  Outcome: Ongoing (interventions implemented as appropriate)      Problem: Patient Care Overview  Goal: Plan of Care Review  Outcome: Ongoing (interventions implemented as appropriate)   12/11/18 1119   Coping/Psychosocial   Plan of Care Reviewed With patient;spouse   Plan of Care Review   Progress improving   OTHER   Outcome Summary AAO x all. MAEW. VSS except conts A-fib and had an episode of HTN. Started on anticoagulant and beta blocker increased. Part one of stress test done. Likely home tomorrow. Wife at bedside and supportive. No seizure activity. Taking PO food and fluids well. Voiding well. Will continue to monitor.       Problem: Seizure Disorder/Epilepsy (Adult)  Goal: Signs and Symptoms of Listed Potential Problems Will be Absent, Minimized or Managed (Seizure Disorder/Epilepsy)  Outcome: Ongoing (interventions implemented as appropriate)      Problem: Pain, Acute (Adult)  Goal: Acceptable Pain Control/Comfort Level  Outcome: Ongoing (interventions implemented as appropriate)      Problem: Anxiety (Adult)  Goal: Reduction/Resolution  Outcome: Ongoing (interventions implemented as appropriate)

## 2018-12-11 NOTE — PROGRESS NOTES
DOS: 2018  NAME: Austin Orta  : 1975  PCP: James Epley APRN  CC: new onset seizure    Stroke    Subjective: No acute events overnight. Feels the same today, states he has felt soar since the seizure he had on Saturday.    Objective:  Vital signs:      Vitals:    12/10/18 2339 18 0350 18 0727 18 0900   BP: 110/67  (!) 137/103  Comment: notified rn che 129/95   BP Location: Right arm  Right arm Left arm   Patient Position: Lying  Sitting Lying   Pulse: 90 88 100 88   Resp:    Temp: 98 °F (36.7 °C)  98.3 °F (36.8 °C)    TempSrc: Oral  Oral    SpO2: 96%      Weight:       Height:           Current Facility-Administered Medications:   •  acetaminophen (TYLENOL) tablet 650 mg, 650 mg, Oral, Q4H PRN, Samir Mathews MD  •  escitalopram (LEXAPRO) tablet 10 mg, 10 mg, Oral, Daily, Samir Mathews MD, 10 mg at 18 0807  •  HYDROcodone-acetaminophen (NORCO) 5-325 MG per tablet 1 tablet, 1 tablet, Oral, Q6H PRN, Samir Mathews MD, 1 tablet at 18 1742  •  levETIRAcetam (KEPPRA) tablet 500 mg, 500 mg, Oral, Q12H, Yulia Tripathi APRN, 500 mg at 18 08  •  metoprolol tartrate (LOPRESSOR) injection 5 mg, 5 mg, Intravenous, Once, Sammie Aldrich APRN  •  metoprolol tartrate (LOPRESSOR) tablet 12.5 mg, 12.5 mg, Oral, Q12H, Samir Mathews MD, 12.5 mg at 18 0807  •  ondansetron (ZOFRAN) injection 4 mg, 4 mg, Intravenous, Q6H PRN, Jairo Siegel MD, 4 mg at 18 0523  •  pantoprazole (PROTONIX) EC tablet 40 mg, 40 mg, Oral, QAM, Samir Mathews MD, 40 mg at 18 0808  •  [COMPLETED] Insert peripheral IV, , , Once **AND** sodium chloride 0.9 % flush 10 mL, 10 mL, Intravenous, PRN, Gregory Berkowitz MD  •  sodium chloride 0.9 % flush 3 mL, 3 mL, Intravenous, Q12H, Samir Mathews MD, 3 mL at 18 0808  •  sodium chloride 0.9 % flush 3-10 mL, 3-10 mL, Intravenous, PRN, Samir Mathews  MD Jose    No current facility-administered medications on file prior to encounter.      Current Outpatient Medications on File Prior to Encounter   Medication Sig   • escitalopram (LEXAPRO) 10 MG tablet Take 1 tablet by mouth Daily.   • fluticasone (FLONASE SENSIMIST) 27.5 MCG/SPRAY nasal spray 2 sprays into each nostril Daily.   • levocetirizine (XYZAL) 5 MG tablet Take 1 tablet by mouth Every Evening.   • naproxen (NAPROSYN) 500 MG tablet Take 1 tablet by mouth 2 (Two) Times a Day With Meals. As needed for pain   • omeprazole (priLOSEC) 20 MG capsule Take 1 capsule by mouth Daily.   • zolpidem (AMBIEN) 5 MG tablet Take 2.5 mg by mouth At Night As Needed.   • azelastine (ASTELIN) 0.1 % nasal spray 1-2 sprays twice daily as needed for nasal congestion   • MethylPREDNISolone (MEDROL, RAFA,) 4 MG tablet Take as directed on package instructions.   • nitroglycerin (NITRODUR) 0.1 MG/HR patch Use as directed   • tamsulosin (FLOMAX) 0.4 MG capsule 24 hr capsule Take 1 capsule by mouth Every Night.   • traZODone (DESYREL) 50 MG tablet Take 1 tablet by mouth Every Night. As needed for insomnia       •  acetaminophen  •  HYDROcodone-acetaminophen  •  ondansetron  •  [COMPLETED] Insert peripheral IV **AND** sodium chloride  •  sodium chloride    General appearance: well groomed, alert and cooperative, sitting up in recliner  HEENT: Normocephalic, atraumatic, no mass or tenderness  COR: afib  Resp: Even and unlabored, clear to auscultation in all 4 lung fields  Extremities: Equal pulses, no edema  Skin: warm, dry, no rashes     Neurological:   MS: AO. Language normal. No neglect. Higher integrative function normal  CN: II-XII normal  Motor: 5/5 strength in all 4 ext., normal tone  Reflexes: 2+ in all 4 ext.  Coordination: Normal finger to nose test, no dysmetria    Physical exam performed, changes noted.    Laboratory results:  Lab Results   Component Value Date    GLUCOSE 120 (H) 12/10/2018    BUN 11 12/10/2018     CREATININE 0.72 (L) 12/10/2018    EGFRIFNONA 119 12/10/2018    EGFRIFAFRI 131 07/26/2018    BCR 15.3 12/10/2018    K 3.9 12/10/2018    CO2 26.2 12/10/2018    CALCIUM 8.7 12/10/2018    PROTENTOTREF 7.3 07/26/2018    ALBUMIN 3.70 12/09/2018    LABIL2 1.8 07/26/2018    AST 24 12/09/2018    ALT 30 12/09/2018     Lab Results   Component Value Date    WBC 11.25 (H) 12/10/2018    HGB 16.0 12/10/2018    HCT 48.6 12/10/2018    MCV 87.7 12/10/2018     12/10/2018     Lab Results   Component Value Date    CHLPL 164 07/26/2018    CHLPL 191 06/21/2016     Lab Results   Component Value Date    TRIG 159 (H) 07/26/2018    TRIG 139 06/21/2016     Lab Results   Component Value Date    HDL 39 (L) 07/26/2018    HDL 49 06/21/2016     Lab Results   Component Value Date    LDL 93 07/26/2018     (H) 06/21/2016     No results found for: HGBA1C  Lab Results   Component Value Date    TSH 2.270 12/09/2018     No results found for: [id number]    Review and interpretation of imaging:  Ct Head Without Contrast    Result Date: 12/9/2018  1. 11 mm hyperdense focus medially in the left frontal lobe suspicious for small mass. Follow-up as above.         This report was finalized on 12/9/2018 2:05 AM by Rafita Lantigua M.D.      Mri Brain With & Without Contrast    Result Date: 12/10/2018  13 mm lesion in the left parafalcine frontal lobe is thought to most likely represent a cavernoma. Consider correlation with any prior outside MRI scans. 2. No other significant findings are noted.  This report was finalized on 12/10/2018 1:23 PM by Dr. Kyler Magana M.D.      12/10/18 2D Echo Interpretation Summary:   · Atrial fibrillation was the predominant rhythm observed during the procedure.  · Calculated EF = 60%.  · Left ventricular systolic function is normal.  · Normal valvular structure and function    12/10/2018 EEG Impression:  Normal EEG during awake and light sleep states.    Impression: This is a 42yo male with a PMH of rebel  "anxiety, insomnia, joint pain, who presented to the hospital after his wife found him on the couch \"shaking\".  Wife noted patient rolled over on their couch and started having jerking motions. He was unarousable at this time and she did note the episode lasted less than a minute.  Afterwards patient was noted to be fatigued, confused, and agitated.  Wife called EMS and patient reportedly had another seizure en-route to the hospital.  CT head showing a left frontal lobe lesion, suspicious of a small mass.  MRI Brain confirmed patient has a 13mm lesion in the left parafalcine frontal lobe that likely represents a cavernoma.  This would explain his new onset seizures.      On exam today, neurologically unchanged.  Patient will have f/u MRI in 3-4 weeks per neurosurgery. Will continue patient on anticonvulsant, will likely need lifelong treatment.  I explained the risks and benefits of taking the drug vs. Not taking it to the patient and patient family at bedside.      Assessment/Plan:  New onset seizure  Frontal mass of brain  Atrial Fibrillation with RVR   Continue Keppra 500mg q12 PO. Will likely need to be on anticonvulsant lifelong.   Will need to follow up in neurology clinic in 3 months with [facility]KE.  Seizure Precautions: Patient is not to drive for at least 3 months until cleared by a physician, operate heavy machinery, take tub baths, swim unattended, climb heights, or perform any other activities that can bring harm to himself/herself or others during an episode of altered awareness.  Per Neurosurgery okay for A/C for afib, cardiology following and managing.   We will sign off, will see again per request.     Plans discussed with patient, patient family, and Dr. Martini.  RASHID Rodriguez      "

## 2018-12-11 NOTE — PLAN OF CARE
Problem: Fall Risk (Adult)  Goal: Identify Related Risk Factors and Signs and Symptoms  Outcome: Outcome(s) achieved Date Met: 12/11/18    Goal: Absence of Fall  Outcome: Ongoing (interventions implemented as appropriate)      Problem: Patient Care Overview  Goal: Plan of Care Review  Outcome: Ongoing (interventions implemented as appropriate)   12/11/18 0553   Coping/Psychosocial   Plan of Care Reviewed With patient   Plan of Care Review   Progress improving   OTHER   Outcome Summary meds per order, pain meds per request, afib cont hr , no falls, see vs and labs     Goal: Individualization and Mutuality  Outcome: Ongoing (interventions implemented as appropriate)    Goal: Discharge Needs Assessment  Outcome: Ongoing (interventions implemented as appropriate)    Goal: Interprofessional Rounds/Family Conf  Outcome: Ongoing (interventions implemented as appropriate)      Problem: Seizure Disorder/Epilepsy (Adult)  Goal: Signs and Symptoms of Listed Potential Problems Will be Absent, Minimized or Managed (Seizure Disorder/Epilepsy)  Outcome: Ongoing (interventions implemented as appropriate)      Problem: Pain, Acute (Adult)  Goal: Identify Related Risk Factors and Signs and Symptoms  Outcome: Outcome(s) achieved Date Met: 12/11/18    Goal: Acceptable Pain Control/Comfort Level  Outcome: Ongoing (interventions implemented as appropriate)      Problem: Anxiety (Adult)  Goal: Identify Related Risk Factors and Signs and Symptoms  Outcome: Outcome(s) achieved Date Met: 12/11/18    Goal: Reduction/Resolution  Outcome: Ongoing (interventions implemented as appropriate)

## 2018-12-11 NOTE — PROGRESS NOTES
" LOS: 2 days     Name: Austin Orta  Age: 43 y.o.  Sex: male  :  1975  MRN: [id number]         Primary Care Physician: Epley, James, APRN    Subjective   Subjective  No new complaints today.  No overnight events.  Denies chest pain, shortness breath, or palpitations    Objective   Vital Signs  Temp:  [97.3 °F (36.3 °C)-98.3 °F (36.8 °C)] 98.1 °F (36.7 °C)  Heart Rate:  [] 97  Resp:  [16-20] 16  BP: (107-137)/() 127/97  Body mass index is 34.72 kg/m².    Objective:  General Appearance:  Comfortable and in no acute distress.    Vital signs: (most recent): Blood pressure 127/97, pulse 97, temperature 98.1 °F (36.7 °C), temperature source Oral, resp. rate 16, height 182.9 cm (72\"), weight 116 kg (256 lb), SpO2 96 %.    Lungs:  Normal effort and normal respiratory rate.    Heart: Normal rate.  Irregular rhythm.    Abdomen: Abdomen is soft.  Bowel sounds are normal.   There is no abdominal tenderness.     Extremities: There is no dependent edema or local swelling.    Neurological: Patient is alert and oriented to person, place and time.    Skin:  Warm and dry.              Results Review:       I reviewed the patient's new clinical results.    Results from last 7 days   Lab Units  12/10/18   0518   0130   WBC 10*3/mm3  11.25*  13.84*   HEMOGLOBIN g/dL  16.0  15.4   PLATELETS 10*3/mm3  320  332     Results from last 7 days   Lab Units  12/10/18   0520  18   0130   SODIUM mmol/L  139  140   POTASSIUM mmol/L  3.9  4.4   CHLORIDE mmol/L  102  102   CO2 mmol/L  26.2  25.4   BUN mg/dL  11  20   CREATININE mg/dL  0.72*  0.91   CALCIUM mg/dL  8.7  8.5*   GLUCOSE mg/dL  120*  116*     Results from last 7 days   Lab Units  18   0130   INR   0.97             Scheduled Meds:     apixaban 5 mg Oral Q12H   escitalopram 10 mg Oral Daily   levETIRAcetam 500 mg Oral Q12H   metoprolol tartrate 5 mg Intravenous Once   metoprolol tartrate 25 mg Oral Q12H   pantoprazole 40 mg Oral QAM   sodium " chloride 3 mL Intravenous Q12H     PRN Meds:   •  acetaminophen  •  HYDROcodone-acetaminophen  •  ondansetron  •  [COMPLETED] Insert peripheral IV **AND** sodium chloride  •  sodium chloride  Continuous Infusions:       Assessment/Plan   Active Hospital Problems    Diagnosis Date Noted   • **New onset seizure (CMS/HCC) [R56.9] 12/09/2018   • Cerebral cavernoma [Q28.3] 12/10/2018   • Atrial fibrillation (CMS/HCC) [I48.91] 12/09/2018   • Depression [F32.9] 07/26/2018      Resolved Hospital Problems   No resolved problems to display.       Assessment & Plan    - Brain MRI reveals a frontal cavernous malformation that will need four-month follow-up.  Neurosurgery following  - Continue Keppra lifelong per neurology.  The patient has been instructed on driving and activity restrictions moving forward until he is cleared by neurology.  -  neurosurgery has cleared him for anticoagulation  - Discussed the case with cardiology.  He will be started on Eliquis.  They will perform a stress test tomorrow for further workup regarding his atrial fibrillation.  If this is negative then he can be discharged home afterwards on oral anticoagulation and follow up for cardioversion in the outpatient setting.  - Beta blocker will be titrated again today, per cardiology          Samir Mathews MD  [facility]  12/11/18  1:38 PM

## 2018-12-12 VITALS
RESPIRATION RATE: 16 BRPM | OXYGEN SATURATION: 99 % | DIASTOLIC BLOOD PRESSURE: 109 MMHG | SYSTOLIC BLOOD PRESSURE: 133 MMHG | WEIGHT: 256 LBS | HEART RATE: 100 BPM | BODY MASS INDEX: 34.67 KG/M2 | TEMPERATURE: 97.8 F | HEIGHT: 72 IN

## 2018-12-12 LAB
BH CV STRESS COMMENTS STAGE 1: NORMAL
BH CV STRESS DOSE REGADENOSON STAGE 1: 0.4
BH CV STRESS DURATION MIN STAGE 1: 0
BH CV STRESS DURATION SEC STAGE 1: 10
BH CV STRESS PROTOCOL 1: NORMAL
BH CV STRESS RECOVERY BP: NORMAL MMHG
BH CV STRESS RECOVERY HR: 103 BPM
BH CV STRESS STAGE 1: 1
LV EF NUC BP: 60 %
MAXIMAL PREDICTED HEART RATE: 176 BPM
PERCENT MAX PREDICTED HR: 75 %
STRESS BASELINE BP: NORMAL MMHG
STRESS BASELINE HR: 82 BPM
STRESS PERCENT HR: 88 %
STRESS POST PEAK BP: NORMAL MMHG
STRESS POST PEAK HR: 132 BPM
STRESS TARGET HR: 150 BPM

## 2018-12-12 PROCEDURE — 0 TECHNETIUM SESTAMIBI: Performed by: INTERNAL MEDICINE

## 2018-12-12 PROCEDURE — 99232 SBSQ HOSP IP/OBS MODERATE 35: CPT | Performed by: INTERNAL MEDICINE

## 2018-12-12 PROCEDURE — 25010000002 ONDANSETRON PER 1 MG: Performed by: HOSPITALIST

## 2018-12-12 PROCEDURE — A9500 TC99M SESTAMIBI: HCPCS | Performed by: INTERNAL MEDICINE

## 2018-12-12 RX ORDER — LEVETIRACETAM 500 MG/1
500 TABLET ORAL EVERY 12 HOURS SCHEDULED
Qty: 60 TABLET | Refills: 0 | Status: SHIPPED | OUTPATIENT
Start: 2018-12-12 | End: 2018-12-12

## 2018-12-12 RX ORDER — LEVETIRACETAM 500 MG/1
500 TABLET ORAL EVERY 12 HOURS SCHEDULED
Qty: 60 TABLET | Refills: 0 | Status: SHIPPED | OUTPATIENT
Start: 2018-12-12 | End: 2019-01-10 | Stop reason: SDUPTHER

## 2018-12-12 RX ADMIN — SODIUM CHLORIDE, PRESERVATIVE FREE 3 ML: 5 INJECTION INTRAVENOUS at 09:28

## 2018-12-12 RX ADMIN — APIXABAN 5 MG: 5 TABLET, FILM COATED ORAL at 09:27

## 2018-12-12 RX ADMIN — METOPROLOL TARTRATE 25 MG: 25 TABLET ORAL at 09:27

## 2018-12-12 RX ADMIN — ACETAMINOPHEN 650 MG: 325 TABLET, FILM COATED ORAL at 09:35

## 2018-12-12 RX ADMIN — ESCITALOPRAM 10 MG: 10 TABLET, FILM COATED ORAL at 09:26

## 2018-12-12 RX ADMIN — PANTOPRAZOLE SODIUM 40 MG: 40 TABLET, DELAYED RELEASE ORAL at 06:15

## 2018-12-12 RX ADMIN — LEVETIRACETAM 500 MG: 500 TABLET, FILM COATED ORAL at 09:26

## 2018-12-12 RX ADMIN — ONDANSETRON 4 MG: 2 INJECTION INTRAMUSCULAR; INTRAVENOUS at 09:26

## 2018-12-12 RX ADMIN — TECHNETIUM TC 99M SESTAMIBI 1 DOSE: 1 INJECTION INTRAVENOUS at 08:45

## 2018-12-12 NOTE — PLAN OF CARE
Problem: Fall Risk (Adult)  Goal: Absence of Fall  Outcome: Ongoing (interventions implemented as appropriate)   12/12/18 0509   Fall Risk (Adult)   Absence of Fall making progress toward outcome       Problem: Patient Care Overview  Goal: Plan of Care Review  Outcome: Ongoing (interventions implemented as appropriate)   12/12/18 0509   Coping/Psychosocial   Plan of Care Reviewed With patient;spouse   Plan of Care Review   Progress improving   OTHER   Outcome Summary VSS with the exception of continued Afib. Pt NPO after midnight for 2nd part of stress test today. No seizure activity noted. Pt ambulating independently with no c/o. Likely d/c home today. Will continue to monitor.       Problem: Seizure Disorder/Epilepsy (Adult)  Goal: Signs and Symptoms of Listed Potential Problems Will be Absent, Minimized or Managed (Seizure Disorder/Epilepsy)  Outcome: Ongoing (interventions implemented as appropriate)   12/12/18 0509   Goal/Outcome Evaluation   Problems Assessed (Seizure Disorder/Epilepsy) all   Problems Present (Seizure/Epilepsy) none       Problem: Pain, Acute (Adult)  Goal: Acceptable Pain Control/Comfort Level  Outcome: Ongoing (interventions implemented as appropriate)   12/12/18 0509   Pain, Acute (Adult)   Acceptable Pain Control/Comfort Level making progress toward outcome       Problem: Anxiety (Adult)  Goal: Reduction/Resolution  Outcome: Ongoing (interventions implemented as appropriate)   12/12/18 0509   Anxiety (Adult)   Reduction/Resolution making progress toward outcome

## 2018-12-12 NOTE — TELEPHONE ENCOUNTER
appt w/KK 3-13 at 9:45. Order in for MRI brain [facility], scheduled for 3-4-19 arrive 10:15 AM. [facility] notified. Letter/forms mailed to patient.

## 2018-12-12 NOTE — PROGRESS NOTES
Case Management Discharge Note    Final Note: Pt discharged home , no known needs.  30 free coupon and copay card provided for [facility] RN    Destination      No service has been selected for the patient.      Durable Medical Equipment      No service has been selected for the patient.      Dialysis/Infusion      No service has been selected for the patient.      Home Medical Care      No service has been selected for the patient.      Community Resources      No service has been selected for the patient.        Other: Other(private auto)    Final Discharge Disposition Code: 01 - home or self-care

## 2018-12-12 NOTE — DISCHARGE SUMMARY
Date of Admission: 12/9/2018  Date of Discharge:  12/12/2018  Primary Care Physician: Epley, James, APRN     Discharge Diagnosis:  Active Hospital Problems    Diagnosis Date Noted   • **New onset seizure (CMS/HCC) [R56.9] 12/09/2018   • Cerebral cavernoma [Q28.3] 12/10/2018   • Atrial fibrillation (CMS/HCC) [I48.91] 12/09/2018   • Depression [F32.9] 07/26/2018      Resolved Hospital Problems   No resolved problems to display.       DETAILS OF HOSPITAL STAY     Pertinent Test Results and Procedures Performed    Head CT:  1. 11 mm hyperdense focus medially in the left frontal lobe suspicious  for small mass. Follow-up as above.      Brain MRI:  13 mm lesion in the left parafalcine frontal lobe is thought  to most likely represent a cavernoma. Consider correlation with any  prior outside MRI scans.  2. No other significant findings are noted.     ECHO:  · Atrial fibrillation was the predominant rhythm observed during the procedure.  · Calculated EF = 60%.  · Left ventricular systolic function is normal.  · Normal valvular structure and function    Stress Test:  · Equivocal ECG evidence of myocardial ischemia.Negative clinical evidence of myocardial ischemia.  · Myocardial perfusion imaging indicates a normal myocardial perfusion study with no evidence of ischemia. Impressions are consistent with a low risk study.    EEG:  Normal EEG during awake and light sleep states    Hospital Course  This is a very pleasant 43-year-old white male who presented to the emergency room after experiencing 2 brief seizure episodes while at home.  Please see H&P for full details of admission.  Initial workup in the emergency room with head CT revealed a 11 mm hyperdense focus in the left frontal lobe that was initially suspicious for a small mass.  This was followed up with a brain MRI and neurosurgery consultation.  Ultimately the MRI showed a lesion in this spot that likely represented a cavernoma.  Neurosurgery recommends follow-up  in 4 months in their office with a brain MRI at that time but otherwise he will have conservative management of this finding.  This was felt to be the nidus of seizure activity and he was placed on Keppra.  Neurology evaluated him and recommended he remain on lifelong Keppra.  He has been educated extensively on driving and activity restrictions associated with seizures.  He expresses explicit understanding of this.  He was evaluated by cardiology for the fact that he was also found to be in new onset atrial fibrillation upon presentation.  He was placed on a beta blocker with good rate control.  He was also placed on Eliquis after neurosurgery cleared him for anticoagulation from the standpoint of the cavernoma.  This has been initiated and he is tolerating without difficulty.  He underwent workup with stress test today that was negative for any ischemia.  His TSH was within normal limits and he had no electrolyte abnormalities.  Cardiology has elected to anticoagulate him over the next 4 weeks and will plan for cardioversion if he is still in atrial fibrillation at that time.  At this point the patient is medically stable and cleared for discharge by all consulting services.  Please see below for follow-up instructions with involved specialists.  I discussed this with the patient today and he is in agreement with this plan.    Physical Exam at Discharge:  General: No acute distress, AAOx3  HEENT: EOMI, PERRL  Cardiovascular: +s1 and s2, RRR  Lungs: No rhonchi or wheezing  Abdomen: soft, nontender    Consults:   Consults     Date and Time Order Name Status Description    12/9/2018 0512 Inpatient Neurology Consult General Completed     12/9/2018 0512 Inpatient Neurosurgery Consult Completed     12/9/2018 0512 Inpatient Cardiology Consult      12/9/2018 0257 [facility] (on-call MD unless specified) Completed             Condition on Discharge: Stable, improved    Discharge Disposition  Home or Self Care    Discharge  Medications     Discharge Medications      New Medications      Instructions Start Date   apixaban 5 MG tablet tablet  Commonly known as:  ELIQUIS   5 mg, Oral, Every 12 Hours Scheduled      levETIRAcetam 500 MG tablet  Commonly known as:  KEPPRA   500 mg, Oral, Every 12 Hours Scheduled      metoprolol tartrate 25 MG tablet  Commonly known as:  LOPRESSOR   25 mg, Oral, Every 12 Hours Scheduled         Continue These Medications      Instructions Start Date   azelastine 0.1 % nasal spray  Commonly known as:  ASTELIN   1-2 sprays twice daily as needed for nasal congestion      escitalopram 10 MG tablet  Commonly known as:  LEXAPRO   10 mg, Oral, Daily      FLONASE SENSIMIST 27.5 MCG/SPRAY nasal spray  Generic drug:  fluticasone   2 sprays, Nasal, Daily      levocetirizine 5 MG tablet  Commonly known as:  XYZAL   5 mg, Oral, Every Evening      nitroglycerin 0.1 MG/HR patch  Commonly known as:  NITRODUR   Use as directed      omeprazole 20 MG capsule  Commonly known as:  priLOSEC   20 mg, Oral, Daily      tamsulosin 0.4 MG capsule 24 hr capsule  Commonly known as:  FLOMAX   1 capsule, Oral, Nightly      traZODone 50 MG tablet  Commonly known as:  DESYREL   50 mg, Oral, Nightly, As needed for insomnia      zolpidem 5 MG tablet  Commonly known as:  AMBIEN   2.5 mg, Oral, Nightly PRN         Stop These Medications    MethylPREDNISolone 4 MG tablet  Commonly known as:  MEDROL (RAFA)     naproxen 500 MG tablet  Commonly known as:  NAPROSYN            Discharge Diet:   Diet Instructions     Diet: Regular; Thin      Discharge Diet:  Regular    Fluid Consistency:  Thin          Activity at Discharge:   Activity Instructions     Discharge Activity Restrictions      Seizure Precautions: Patient is not to drive for at least 3 months until cleared by a physician, operate heavy machinery, take tub baths, swim unattended, climb heights, or perform any other activities that can bring harm to himself/herself or others during an episode  of altered awareness.          Follow-up Appointments  Future Appointments   Date Time Provider Department Center   1/7/2019  9:30 AM Bashir Syed MD [facility] None     Additional Instructions for the Follow-ups that You Need to Schedule     Discharge Follow-up with PCP   As directed       Currently Documented PCP:    Epley, James, APRN    PCP Phone Number:    [phone]     Follow Up Details:  1 week         Discharge Follow-up with Specified Provider: Dr. Syed of cardiology in 4 weeks   As directed      To:  Dr. Syed of cardiology in 4 weeks         Discharge Follow-up with Specified Provider: Dr. Morin of Neurosurgery in 4 months with repeat brain MRI at that time   As directed      To:  Dr. Morin of Neurosurgery in 4 months with repeat brain MRI at that time         Discharge Follow-up with Specified Provider: [facility] MIKE of Neurology in 3 months   As directed      To:  [facility] MIKE of Neurology in 3 months                 I have examined and discussed discharge planning with the patient today.     Samir Mathews MD  12/12/18  12:50 PM    Time: Discharge greater than 30 min

## 2018-12-12 NOTE — DISCHARGE INSTRUCTIONS
Will need to follow up in neurology clinic in 3 months with [facility] MIKE.  Seizure Precautions: Patient is not to drive for at least 3 months until cleared by a physician, operate heavy machinery, take tub baths, swim unattended, climb heights, or perform any other activities that can bring harm to himself/herself or others during an episode of altered awareness.

## 2018-12-12 NOTE — PROGRESS NOTES
[facility]  Cardiology Progress Note    Patient Identification:  Name: Austin Orta  Age: 43 y.o.  Sex: male  :  1975  MRN: [id number]                 Follow Up / Chief Complaint:      Interval History: 43-year-old male with a significant medical issues, has been admitted to the hospital with seizure activity, denies any chest pains or tightness in the chest, was found to have atrial fibrillation with uncontrolled rate      Subjective: denies palps, syncope, or near syncope. No further chest pain/ heaviness last 24 hours.  HR better controlled.       Objective:    Temp:  [97.3 °F (36.3 °C)-98.3 °F (36.8 °C)] 98.1 °F (36.7 °C)  Heart Rate:  [] 97  Resp:  [16-20] 16  BP: (107-137)/() 127/97   SpO2:  [96 %-99 %] 96 %      Past Medical History:  Past Medical History:   Diagnosis Date   • Allergic     seasonal   • Anxiety    • Anxiety and depression    • Depression    • GERD (gastroesophageal reflux disease)    • History of prostatitis    • Injury of right heel 2018   • Insomnia    • Joint pain    • Kidney stones 2017,  also   • Left rotator cuff tear    • Prostatitis    • Right rotator cuff tear      Past Surgical History:  Past Surgical History:   Procedure Laterality Date   • CHOLECYSTECTOMY     • INGUINAL HERNIA REPAIR     • KIDNEY STONE SURGERY     • TONSILLECTOMY     • VASECTOMY          Social History:   Social History     Tobacco Use   • Smoking status: Never Smoker   • Smokeless tobacco: Never Used   Substance Use Topics   • Alcohol use: No      Family History:  Family History   Problem Relation Age of Onset   • Cancer Mother    • Hypertension Mother    • Nephrolithiasis Sister    • Heart attack Maternal Grandmother    • Cancer Maternal Grandmother           Allergies:  Allergies   Allergen Reactions   • Ciprofloxacin Nausea And Vomiting and Myalgia     Scheduled Meds:    apixaban 5 mg Q12H   escitalopram 10 mg Daily   levETIRAcetam 500 mg Q12H   metoprolol  tartrate 5 mg Once   metoprolol tartrate 25 mg Q12H   pantoprazole 40 mg QAM   sodium chloride 3 mL Q12H           INTAKE AND OUTPUT:    Intake/Output Summary (Last 24 hours) at 2018 1237  Last data filed at 2018 0600  Gross per 24 hour   Intake 540 ml   Output --   Net 540 ml       Review of Systems:   GI: denies abd pain or n/v  Cardiac: no chest pain, denies palps  Pulmonary: + snorer, denies cough.    Constitutional:  Temp:  [97.8 °F (36.6 °C)] 97.8 °F (36.6 °C)  Heart Rate:  [100] 100  Resp:  [16] 16  BP: (105-133)/() 133/109   SpO2:  [97 %-99 %] 99 %    Physical Exam:  General:  Appears in no acute distress, obese  Eyes: non icteric, no conjunctival discharge  HEENT:  No JVD. Thyroid not visibly enlarged. No mucosal pallor or cyanosis  Respiratory: Respirations regular and unlabored at rest. BBS with good air entry in all fields. No crackles, rubs or wheezes auscultated  Cardiovascular: S1S2 Regular rate and rhythm. No murmur, rub or gallop. No carotid bruits. DP/PT pulses 2+  . No pretibial pitting edema  Gastrointestinal: Abdomen soft, flat, non tender. Bowel sounds present. No hepatosplenomegaly. No ascites  Musculoskeletal: [place] x4. No abnormal movements  Extremities: No digital clubbing or cyanosis  Skin: Color pink. Skin warm and dry to touch. No rashes    Neuro: AAO x3 CN II-XII grossly intact  Psych: Mood and affect normal, pleasant and cooperative    Cardiographics    Telemetry:     AFib rate controlled                        EC/9/18            Echocardiogram:     Interpretation Summary 12/10/18    · Atrial fibrillation was the predominant rhythm observed during the procedure.  · Calculated EF = 60%.  · Left ventricular systolic function is normal.  · Normal valvular structure and function            Lab Review   Results from last 7 days   Lab Units  12/10/18   0520  18   0130   TROPONIN T ng/mL  <0.010  <0.010     Results from last 7 days   Lab Units  18   0130    MAGNESIUM mg/dL  2.3     Results from last 7 days   Lab Units  12/10/18   0520   SODIUM mmol/L  139   POTASSIUM mmol/L  3.9   BUN mg/dL  11   CREATININE mg/dL  0.72*   CALCIUM mg/dL  8.7       Results from last 7 days   Lab Units  12/10/18   0520  12/09/18   0130   WBC 10*3/mm3  11.25*  13.84*   HEMOGLOBIN g/dL  16.0  15.4   HEMATOCRIT %  48.6  44.1   PLATELETS 10*3/mm3  320  332     Results from last 7 days   Lab Units  12/09/18   0130   INR   0.97     MRI of Brain 12/9/18  IMPRESSION:  13 mm lesion in the left parafalcine frontal lobe is thought  to most likely represent a cavernoma. Consider correlation with any  prior outside MRI scans.  2. No other significant findings are noted.      Active Hospital Problems     Diagnosis Date Noted   • **New onset seizure (CMS/HCC) [R56.9] 12/09/2018   • Cerebral cavernoma [Q28.3] 12/10/2018   • Atrial fibrillation (CMS/HCC) [I48.91] 12/09/2018   • Depression [F32.9]      CV Plan:   New onset atrial fibrillation:  Rate controlled.  TSH wnl. K+ and mag normal on admit. Echo showing normal LVSF   CHADS-VASc 1,  accounting for some issues with HTN during this stay. No history of HTN.  MRI revealed Cerebral cavernoma and Neurosurgery states no contraindications to anticoagulation.  Will start eliquis 5 mg bid.  Increase metoprolol from 12.5 mg to 25 mg BID to help with BP and HR rate control.  Monitor.        Chest pain: had episode yesterday of chest pain.  Hx of early onset CAD on mothers side.  Consider ischemia workup if ok with neurosurgery.  Trop neg x 2 on admit. 12 lead EKG showing borderline t wave abnormalities in inferior leads.   Recommend Nuc stress test.  Discussed risks and benefits of myocardial perfusion testing with him, mother and wife in room.  Pt agreeable to move forward with testing.  Will set up for 2 day nuc stress.  Rest images today then NPO after MN for stress portion and no caffeine starting now.     Cerebral Cavernoma: plans for f/u as outpt.   Neurosurgery reports no contraindications to anticoagulation from their standpoint.     Possible sleep d/o breathing: Noted 2.6 second pause while sleeping last night.  + snorer and body habitus concerning for potential sleep d/o breathing.  Work up as outpt recommended.  CV risks of untreated sleep apnea discussed with pt and family.     STRESS TEST NORMAL, MAY NEED CV IN 1 MONTH  CONTINUE BETA BLOCKERS AND ANTICOAGULATION      )12/12/2018  Bashir Syed MD

## 2019-01-07 ENCOUNTER — OFFICE VISIT (OUTPATIENT)
Dept: CARDIOLOGY | Facility: CLINIC | Age: 44
End: 2019-01-07

## 2019-01-07 VITALS
DIASTOLIC BLOOD PRESSURE: 81 MMHG | HEIGHT: 72 IN | WEIGHT: 259 LBS | SYSTOLIC BLOOD PRESSURE: 121 MMHG | BODY MASS INDEX: 35.08 KG/M2 | HEART RATE: 70 BPM

## 2019-01-07 DIAGNOSIS — Z79.01 ANTICOAGULATED: ICD-10-CM

## 2019-01-07 DIAGNOSIS — I48.0 PAROXYSMAL ATRIAL FIBRILLATION (HCC): Primary | ICD-10-CM

## 2019-01-07 DIAGNOSIS — E78.5 HYPERLIPIDEMIA, UNSPECIFIED HYPERLIPIDEMIA TYPE: ICD-10-CM

## 2019-01-07 PROCEDURE — 99214 OFFICE O/P EST MOD 30 MIN: CPT | Performed by: INTERNAL MEDICINE

## 2019-01-07 PROCEDURE — 93000 ELECTROCARDIOGRAM COMPLETE: CPT | Performed by: INTERNAL MEDICINE

## 2019-01-07 NOTE — PROGRESS NOTES
" Subjective:        Austin Orta is a 43 y.o. male who here for follow up    CC  Fatigue, atrial fibrillation  HPI  43-year-old male with atrial fibrillation has been complaining of the weakness as well as fatigue now on anticoagulation for more than one month continues to feel shortness of breath and weakness     Problem List Items Addressed This Visit        Cardiovascular and Mediastinum    Hyperlipidemia    Atrial fibrillation (CMS/HCC) - Primary    Relevant Medications    metoprolol tartrate (LOPRESSOR) 25 MG tablet    Other Relevant Orders    Cardioversion External in Cardiology Department       Other    Anticoagulated        .    The following portions of the patient's history were reviewed and updated as appropriate: allergies, current medications, past family history, past medical history, past social history, past surgical history and problem list.    Past Medical History:   Diagnosis Date   • Allergic     seasonal   • Anxiety    • Anxiety and depression    • Depression    • GERD (gastroesophageal reflux disease)    • History of prostatitis    • Injury of right heel 07/27/2018   • Insomnia    • Joint pain    • Kidney stones 2017 1997, 2010 also   • Left rotator cuff tear    • Prostatitis    • Right rotator cuff tear      reports that  has never smoked. he has never used smokeless tobacco. He reports that he does not drink alcohol or use drugs.   Family History   Problem Relation Age of Onset   • Cancer Mother    • Hypertension Mother    • Nephrolithiasis Sister    • Heart attack Maternal Grandmother    • Cancer Maternal Grandmother        Review of Systems  Constitutional: No wt loss, fever, fatigue  Gastrointestinal: No nausea, abdominal pain  Behavioral/Psych: No insomnia or anxiety   Cardiovascular shortness of breath and weakness  Objective:       Physical Exam           Physical Exam  /81 (BP Location: Left arm, Patient Position: Sitting)   Pulse 70   Ht 182.9 cm (72\")   Wt 117 kg (259 " lb)   BMI 35.13 kg/m²     General appearance: NAD, conversant   Eyes: anicteric sclerae, moist conjunctivae; no lid-lag; PERRLA   HENT: Atraumatic; oropharynx clear with moist mucous membranes and no mucosal ulcerations;  normal hard and soft palate   Neck: Trachea midline; FROM, supple, no thyromegaly or lymphadenopathy   Lungs: CTA, with normal respiratory effort and no intercostal retractions   CV: S1-S2 irregular, no murmurs, no rub, no gallop   Abdomen: Soft, non-tender; no masses or HSM   Extremities: No peripheral edema or extremity lymphadenopathy  Skin: Normal temperature, turgor and texture; no rash, ulcers or subcutaneous nodules   Psych: Appropriate affect, alert and oriented to person, place and time             ECG 12 Lead  Date/Time: 1/7/2019 9:53 AM  Performed by: Bashir Syed MD  Authorized by: Bashir Syed MD   Comparison: compared with previous ECG   Similar to previous ECG  Rhythm: atrial fibrillation  ST Flattening: all  Clinical impression: abnormal ECG              Echocardiogram:        Current Outpatient Medications:   •  apixaban (ELIQUIS) 5 MG tablet tablet, Take 1 tablet by mouth Every 12 (Twelve) Hours for 30 days., Disp: 60 tablet, Rfl: 0  •  azelastine (ASTELIN) 0.1 % nasal spray, 1-2 sprays twice daily as needed for nasal congestion, Disp: 1 each, Rfl: 12  •  escitalopram (LEXAPRO) 10 MG tablet, Take 1 tablet by mouth Daily., Disp: 90 tablet, Rfl: 2  •  fluticasone (FLONASE SENSIMIST) 27.5 MCG/SPRAY nasal spray, 2 sprays into each nostril Daily., Disp: , Rfl:   •  levETIRAcetam (KEPPRA) 500 MG tablet, Take 1 tablet by mouth Every 12 (Twelve) Hours for 30 days., Disp: 60 tablet, Rfl: 0  •  levocetirizine (XYZAL) 5 MG tablet, Take 1 tablet by mouth Every Evening., Disp: 90 tablet, Rfl: 2  •  metoprolol tartrate (LOPRESSOR) 25 MG tablet, Take 1 tablet by mouth Every 12 (Twelve) Hours for 30 days., Disp: 60 tablet, Rfl: 0  •  omeprazole (priLOSEC) 20 MG capsule, Take  1 capsule by mouth Daily., Disp: 90 capsule, Rfl: 2  •  zolpidem (AMBIEN) 5 MG tablet, Take 2.5 mg by mouth At Night As Needed., Disp: , Rfl:    Assessment:        Patient Active Problem List   Diagnosis   • Atopic rhinitis   • Biliary dyskinesia   • Diarrhea   • Erectile dysfunction of nonorganic origin   • Gastroesophageal reflux disease   • Insomnia   • Prostatitis   • Chronic fatigue   • Seasonal allergies   • Right shoulder pain   • Hyperlipidemia   • Non morbid obesity due to excess calories   • Eustachian tube dysfunction   • Rotator cuff arthropathy   • Viral syndrome   • Pain of both hip joints   • Abdominal pain   • Acute bilateral low back pain without sciatica   • Strain of Achilles tendon, initial encounter   • Depression   • New onset seizure (CMS/HCC)   • Atrial fibrillation (CMS/HCC)   • Cerebral cavernoma         Interpretation Summary     · Equivocal ECG evidence of myocardial ischemia.Negative clinical evidence of myocardial ischemia.  · Myocardial perfusion imaging indicates a normal myocardial perfusion study with no evidence of ischemia. Impressions are consistent with a low risk study.     Interpretation Summary     · Atrial fibrillation was the predominant rhythm observed during the procedure.  · Calculated EF = 60%.  · Left ventricular systolic function is normal.  · Normal valvular structure and function           Plan:            ICD-10-CM ICD-9-CM   1. Paroxysmal atrial fibrillation (CMS/HCC) I48.0 427.31   2. Hyperlipidemia, unspecified hyperlipidemia type E78.5 272.4   3. Anticoagulated Z79.01 V58.61     1. Paroxysmal atrial fibrillation (CMS/HCC)  Atrial fibrillation rate controlled    Considering patient is symptomatic will be considered for the cardioversion  - Cardioversion External in Cardiology Department; Future    2. Hyperlipidemia, unspecified hyperlipidemia type  Risk of the hyperlipidemia, importance of the treatment has been explained    Pros and cons of the antilipemic  drugs has been explained    Regular blood workup as well as side effects including the liver failure, myelopathy death has been explained    3. Anticoagulated  Pros and cons as well as indication of the anticoagulation has been explained to the patient in detail    There are no obvious complications at this stage    Risk of  the bleedings has been explained    Need for the regular blood workup and adjust the dose has been explained    Need for proper follow-up on anticoagulation also has been explained         Needs cv    schedue  25 th    Pt swears , taking anticoagulation on regular basis without missing the dose and has been well anticoagulated    Procedure , risks and options of cardioversion has been explained. Austin Orta understands well and agrees.  COUNSELING:    Austin Griffiths was given to patient for the following topics: diagnostic results, risk factor reductions, impressions, risks and benefits of treatment options and importance of treatment compliance .       SMOKING COUNSELING:    Counseling given: Not Answered      Dictated using Dragon dictation

## 2019-01-10 RX ORDER — LEVETIRACETAM 500 MG/1
500 TABLET ORAL EVERY 12 HOURS SCHEDULED
Qty: 60 TABLET | Refills: 2 | Status: SHIPPED | OUTPATIENT
Start: 2019-01-10 | End: 2019-05-01

## 2019-01-11 ENCOUNTER — OFFICE VISIT (OUTPATIENT)
Dept: FAMILY MEDICINE CLINIC | Facility: CLINIC | Age: 44
End: 2019-01-11

## 2019-01-11 VITALS
SYSTOLIC BLOOD PRESSURE: 130 MMHG | WEIGHT: 262 LBS | OXYGEN SATURATION: 94 % | DIASTOLIC BLOOD PRESSURE: 70 MMHG | BODY MASS INDEX: 35.49 KG/M2 | HEIGHT: 72 IN | HEART RATE: 85 BPM

## 2019-01-11 DIAGNOSIS — M25.511 ACUTE PAIN OF RIGHT SHOULDER: Primary | ICD-10-CM

## 2019-01-11 PROCEDURE — 99213 OFFICE O/P EST LOW 20 MIN: CPT | Performed by: NURSE PRACTITIONER

## 2019-01-11 NOTE — PROGRESS NOTES
Subjective   Austin Orta is a 43 y.o. male.     Pleasant gentleman here for follow-up unfortunately had a recent seizure at home,  EMS was called, found to be in atrial fibrillation as well  Original CAT scan CT brain with mass followed up with MRI showing a small  13 mm vascular malformation  canervoma     MRI OF THE BRAIN WITH AND WITHOUT CONTRAST 12/09/2018     HISTORY: Seizure. Brain lesion on CT scan.     Multiple precontrast and postcontrast sagittal, axial and coronal images  were obtained through the brain.     The diffusion-weighted images show no evidence of acute infarction. In  the left frontal parafalcine region there is a small soft tissue nodule  which measures approximately 13 mm. This demonstrates T1 low signal and  T2 bright signal with a rim of surrounding hemosiderin. They demonstrate  some central enhancement. This is thought to most likely represent a  cavernoma.     There is no evidence of acute hemorrhage. No midline shift is seen.     Normal-appearing vascular flow voids are seen. Craniocervical junction  and sella appear normal.     IMPRESSION:  13 mm lesion in the left parafalcine frontal lobe is thought  to most likely represent a cavernoma. Consider correlation with any  prior outside MRI scans.  2. No other significant findings are noted.     This report was finalized on 12/10/2018 1:23 PM by Dr. Kyler Magana M.D.       Patient's been discharged on Kera  Eliquis  Has seen cardiology  Cardioversion planned this month  Follow-up MRI brain and neurosurgery in March    Feeling better still some memory problems  No confusion  No further seizure  Not driving for 90 days until last seizure until neurology clearance        No chest pain shortness of breath  Does have some exertional fatigue    Still having quite a bit of shoulder pain  What can he take         The following portions of the patient's history were reviewed and updated as appropriate: allergies, current medications,  past family history, past medical history, past social history, past surgical history and problem list.    Review of Systems   Constitutional: Positive for fatigue.   Respiratory: Negative for shortness of breath.    Cardiovascular: Negative for chest pain.   Gastrointestinal: Negative.    Genitourinary: Negative.    All other systems reviewed and are negative.      Objective   Physical Exam   Constitutional: He is oriented to person, place, and time. He appears well-developed and well-nourished. No distress.   HENT:   Head: Normocephalic and atraumatic.   Nose: Nose normal.   Mouth/Throat: Oropharynx is clear and moist.   Eyes: Conjunctivae are normal. Pupils are equal, round, and reactive to light. No scleral icterus.   Neck: Neck supple. No JVD present. No thyromegaly present.   Cardiovascular: Normal rate and normal heart sounds. Exam reveals no gallop and no friction rub.   No murmur heard.  Heart rhythm irregularly irregulare rate controlled   Pulmonary/Chest: Effort normal and breath sounds normal. No respiratory distress. He has no wheezes. He has no rales.   Abdominal: Soft. Bowel sounds are normal. He exhibits no distension and no mass. There is no tenderness. There is no guarding. No hernia.   Musculoskeletal: He exhibits no edema or tenderness.   Lymphadenopathy:     He has no cervical adenopathy.   Neurological: He is alert and oriented to person, place, and time. He has normal reflexes.   Skin: Skin is warm and dry. No rash noted. He is not diaphoretic. No erythema.   Psychiatric: He has a normal mood and affect. His behavior is normal. Judgment and thought content normal.   Vitals reviewed.        Assessment/Plan   Austin was seen today for follow-up liseth kim.    Diagnoses and all orders for this visit:    Acute pain of right shoulder  -     Ambulatory Referral to Sports Medicine                  Avoid NSAIDs  Avoid steroids  Could induce atrial fibrillation  Very low dose with injection probably  okay  If orthopedic chooses  Follow-up cardiology neurology  Recheck one month do not drive until cleared  Avoid heights swimming and bathing alone  High-risk activities  Chest pain shortness of breath seizure emergency room    Tylenol okay  Topical OTC

## 2019-01-14 ENCOUNTER — OFFICE VISIT (OUTPATIENT)
Dept: SPORTS MEDICINE | Facility: CLINIC | Age: 44
End: 2019-01-14

## 2019-01-14 VITALS
HEART RATE: 75 BPM | SYSTOLIC BLOOD PRESSURE: 124 MMHG | DIASTOLIC BLOOD PRESSURE: 68 MMHG | HEIGHT: 72 IN | OXYGEN SATURATION: 100 % | BODY MASS INDEX: 35.49 KG/M2 | WEIGHT: 262 LBS

## 2019-01-14 DIAGNOSIS — M75.41 SUBACROMIAL IMPINGEMENT OF RIGHT SHOULDER: ICD-10-CM

## 2019-01-14 DIAGNOSIS — M75.31 CALCIFIC TENDINITIS OF RIGHT SHOULDER: ICD-10-CM

## 2019-01-14 DIAGNOSIS — M25.511 ACUTE PAIN OF RIGHT SHOULDER: Primary | ICD-10-CM

## 2019-01-14 PROCEDURE — 99244 OFF/OP CNSLTJ NEW/EST MOD 40: CPT | Performed by: FAMILY MEDICINE

## 2019-01-14 PROCEDURE — 73030 X-RAY EXAM OF SHOULDER: CPT | Performed by: FAMILY MEDICINE

## 2019-01-14 NOTE — PROGRESS NOTES
"Austin is a 44 y.o. year old male    Chief Complaint   Patient presents with   • Right Shoulder - Consult     Ref James Epley  Started 6 weeks ago, playing volleyball, collided with teammate.   Recently prescribed aleve/mobic, cannot take due to atrial fib and seizure activity.   Difficult with mobility.        History of Present Illness  Has had ongoing right shoulder pain ×6 weeks after a collision while playing volleyball.  He felt his arm bend backwards though does not associate pop.  Has had ongoing night pain, decreased range of motion.  Unfortunately, has suffered seizure and new onset A. fib over the past month and is unable to take NSAIDs though they were helpful.    I have reviewed the patient's medical, family, and social history in detail and updated the computerized patient record.    Review of Systems  Constitutional: Negative for fever.   Musculoskeletal:        Per HPI   Skin: Negative for rash.   Neurological: Negative for weakness and numbness.   Psychiatric/Behavioral: Negative for sleep disturbance.   All other systems reviewed and are negative.    /68   Pulse 75   Ht 182.9 cm (72\")   Wt 119 kg (262 lb)   SpO2 100%   BMI 35.53 kg/m²      Physical Exam    Vital signs reviewed.   General: No acute distress.  Eyes: conjunctiva clear; pupils equally round and reactive  ENT: external ears and nose atraumatic; oropharynx clear  CV: no peripheral edema, 2+ distal pulses  Resp: normal respiratory effort, no use of accessory muscles  Skin: no rashes or wounds; normal turgor  Psych: mood and affect appropriate; recent and remote memory intact  Neuro: sensation to light touch intact    MSK Exam:    Right shoulder demonstrates full range of motion though pain elicited with abduction greater than 150°.  Negative drop arm.  Positive empty can, positive Trevino, positive scarf.  Negative lift off.    Right Shoulder X-Ray  Indication: Pain  Views: AP and Lateral    Findings:  No fracture  No bony " lesion  Calcific tendinitis of supraspinatus tendon  Normal joint spaces    No prior studies were available for comparison.    Diagnoses and all orders for this visit:    Acute pain of right shoulder  -     XR Shoulder 2+ View Right    Subacromial impingement of right shoulder    Calcific tendinitis of right shoulder      Discussed differential of presenting complaint.  Did offer cortisone injection by Renato would like to wait until after cardioversion that is scheduled for January 25, 2019.  Home exercise program given.  Sample of [place].    EMR Dragon/Transcription disclaimer:    Much of this encounter note is an electronic transcription/translation of spoken language to printed text.  The electronic translation of spoken language may permit erroneous, or at times, nonsensical words or phrases to be inadvertently transcribed.  Although I have reviewed the note for such errors some may still exist.

## 2019-01-25 ENCOUNTER — HOSPITAL ENCOUNTER (OUTPATIENT)
Dept: POSTOP/PACU | Facility: HOSPITAL | Age: 44
Discharge: HOME | End: 2019-01-25
Attending: INTERNAL MEDICINE | Admitting: INTERNAL MEDICINE

## 2019-01-25 ENCOUNTER — ANESTHESIA (OUTPATIENT)
Dept: POSTOP/PACU | Facility: HOSPITAL | Age: 44
End: 2019-01-25

## 2019-01-25 ENCOUNTER — ANESTHESIA EVENT (OUTPATIENT)
Dept: POSTOP/PACU | Facility: HOSPITAL | Age: 44
End: 2019-01-25

## 2019-01-25 VITALS
HEART RATE: 66 BPM | DIASTOLIC BLOOD PRESSURE: 92 MMHG | OXYGEN SATURATION: 95 % | RESPIRATION RATE: 18 BRPM | TEMPERATURE: 98.4 F | SYSTOLIC BLOOD PRESSURE: 136 MMHG

## 2019-01-25 VITALS — OXYGEN SATURATION: 95 % | DIASTOLIC BLOOD PRESSURE: 83 MMHG | SYSTOLIC BLOOD PRESSURE: 131 MMHG

## 2019-01-25 DIAGNOSIS — I48.0 PAROXYSMAL ATRIAL FIBRILLATION (HCC): ICD-10-CM

## 2019-01-25 LAB
ANION GAP SERPL CALCULATED.3IONS-SCNC: 12.2 MMOL/L
BUN BLD-MCNC: 12 MG/DL (ref 6–20)
BUN/CREAT SERPL: 14.8 (ref 7–25)
CALCIUM SPEC-SCNC: 9.3 MG/DL (ref 8.6–10.5)
CHLORIDE SERPL-SCNC: 101 MMOL/L (ref 98–107)
CO2 SERPL-SCNC: 24.8 MMOL/L (ref 22–29)
CREAT BLD-MCNC: 0.81 MG/DL (ref 0.76–1.27)
GFR SERPL CREATININE-BSD FRML MDRD: 103 ML/MIN/1.73
GLUCOSE BLD-MCNC: 120 MG/DL (ref 65–99)
POTASSIUM BLD-SCNC: 4.3 MMOL/L (ref 3.5–5.2)
SODIUM BLD-SCNC: 138 MMOL/L (ref 136–145)

## 2019-01-25 PROCEDURE — 92960 CARDIOVERSION ELECTRIC EXT: CPT

## 2019-01-25 PROCEDURE — 93005 ELECTROCARDIOGRAM TRACING: CPT | Performed by: INTERNAL MEDICINE

## 2019-01-25 PROCEDURE — 93010 ELECTROCARDIOGRAM REPORT: CPT | Performed by: INTERNAL MEDICINE

## 2019-01-25 PROCEDURE — 25010000002 PROPOFOL 10 MG/ML EMULSION: Performed by: NURSE ANESTHETIST, CERTIFIED REGISTERED

## 2019-01-25 PROCEDURE — 80048 BASIC METABOLIC PNL TOTAL CA: CPT | Performed by: INTERNAL MEDICINE

## 2019-01-25 PROCEDURE — 92960 CARDIOVERSION ELECTRIC EXT: CPT | Performed by: INTERNAL MEDICINE

## 2019-01-25 RX ORDER — PROPOFOL 10 MG/ML
VIAL (ML) INTRAVENOUS AS NEEDED
Status: DISCONTINUED | OUTPATIENT
Start: 2019-01-25 | End: 2019-01-25 | Stop reason: SURG

## 2019-01-25 RX ORDER — SODIUM CHLORIDE 9 MG/ML
INJECTION, SOLUTION INTRAVENOUS CONTINUOUS PRN
Status: DISCONTINUED | OUTPATIENT
Start: 2019-01-25 | End: 2019-01-25 | Stop reason: SURG

## 2019-01-25 RX ADMIN — PROPOFOL 100 MG: 10 INJECTION, EMULSION INTRAVENOUS at 08:05

## 2019-01-25 RX ADMIN — SODIUM CHLORIDE: 9 INJECTION, SOLUTION INTRAVENOUS at 08:00

## 2019-01-25 NOTE — DISCHARGE INSTRUCTIONS
SEDATION DISCHARGE INSTRUCTIONS.    IMPORTANT: The following information will help you return to your best level of health.  Sedation.  You have had a procedure that called for some medicine to reduce anxiety and pain. This medicine (or medicines) is called  sedation. After receiving the medicine, you may be sleepy, but able to breathe on your own. The effects of the medicine may last for several hours.    Follow these instructions after sedation:   Go right home. Rest quietly at home today, then you can be up and about.   Do not drink alcohol, drive or operate machinery for 24 hours.   Do not do anything where light-headedness or clumsiness would be dangerous.   Do not make important decisions or sign any legal papers for the next 24 hours.   Make sure A RESPONSIBLE PERSON stays with you the rest of today and overnight for your protection and safety.   Start your diet with fluids and light foods (jello, soup, juice, toast). Then, slowly progress to your usual diet if you are not sick to your stomach.    Call your doctor if you have:   a gray or blue skin color.   excess sleepiness.   repeated vomiting.   trouble breathing.   any new problems or concerns.

## 2019-01-25 NOTE — ANESTHESIA PREPROCEDURE EVALUATION
Anesthesia Evaluation     Patient summary reviewed and Nursing notes reviewed   no history of anesthetic complications:  NPO Solid Status: > 8 hours  NPO Liquid Status: > 2 hours           Airway   Mallampati: III  TM distance: >3 FB  Neck ROM: full  no difficulty expected  Dental - normal exam     Pulmonary - negative pulmonary ROS and normal exam   (-) COPD, asthma, not a smoker, lung cancer  Cardiovascular - normal exam  Exercise tolerance: good (4-7 METS)    ECG reviewed  PT is on anticoagulation therapy  Patient on routine beta blocker and Beta blocker given within 24 hours of surgery  Rhythm: regular  Rate: normal    (+) dysrhythmias Atrial Fib, hyperlipidemia,   (-) hypertension, valvular problems/murmurs, past MI, CAD, angina, CHF, cardiac stents, CABG, pericardial effusion      Neuro/Psych  (+) seizures well controlled, psychiatric history Anxiety and Depression,     (-) TIA, CVA    ROS Comment: Newly dx/ AVM  GI/Hepatic/Renal/Endo    (+) obesity,  GERD well controlled,    (-) hepatitis, liver disease, no renal disease, diabetes, hypothyroidism    Musculoskeletal (-) negative ROS    Abdominal  - normal exam   Substance History - negative use     OB/GYN negative ob/gyn ROS         Other - negative ROS                       Anesthesia Plan    ASA 3     general     intravenous induction   Anesthetic plan, all risks, benefits, and alternatives have been provided, discussed and informed consent has been obtained with: patient.    Plan discussed with CRNA and attending.

## 2019-01-25 NOTE — ANESTHESIA POSTPROCEDURE EVALUATION
Patient: Austin Orta    Procedure Summary     Date:  01/25/19 Room / Location:  [facility] PACU    Anesthesia Start:  0800 Anesthesia Stop:  0813    Procedure:  CARDIOVERSION EXTERNAL IN CARDIOLOGY DEPARTMENT Diagnosis:       Paroxysmal atrial fibrillation (CMS/HCC)      (AFIB)    Scheduled Providers:  Bashir Syed MD Provider:  Wicho Palma MD    Anesthesia Type:  general ASA Status:  3          Anesthesia Type: general  Last vitals  BP   121/82 (01/25/19 0830)   Temp   36.9 °C (98.4 °F) (01/25/19 0707)   Pulse   64 (01/25/19 0830)   Resp   16 (01/25/19 0830)     SpO2   97 % (01/25/19 0830)     Post Anesthesia Care and Evaluation    Patient location during evaluation: PACU  Patient participation: complete - patient participated  Level of consciousness: awake and alert  Pain score: 0  Pain management: adequate  Airway patency: patent  Anesthetic complications: No anesthetic complications    Cardiovascular status: acceptable  Respiratory status: acceptable  Hydration status: acceptable    Comments: /82   Pulse 64   Temp 36.9 °C (98.4 °F) (Oral)   Resp 16   SpO2 97%

## 2019-02-05 ENCOUNTER — OFFICE VISIT (OUTPATIENT)
Dept: SPORTS MEDICINE | Facility: CLINIC | Age: 44
End: 2019-02-05

## 2019-02-05 VITALS
HEIGHT: 72 IN | BODY MASS INDEX: 35.49 KG/M2 | WEIGHT: 262 LBS | DIASTOLIC BLOOD PRESSURE: 68 MMHG | SYSTOLIC BLOOD PRESSURE: 124 MMHG

## 2019-02-05 DIAGNOSIS — M25.511 ACUTE PAIN OF RIGHT SHOULDER: Primary | ICD-10-CM

## 2019-02-05 DIAGNOSIS — M75.31 CALCIFIC TENDINITIS OF RIGHT SHOULDER: ICD-10-CM

## 2019-02-05 DIAGNOSIS — Z23 NEED FOR VACCINATION: ICD-10-CM

## 2019-02-05 PROCEDURE — 90471 IMMUNIZATION ADMIN: CPT | Performed by: FAMILY MEDICINE

## 2019-02-05 PROCEDURE — 20610 DRAIN/INJ JOINT/BURSA W/O US: CPT | Performed by: FAMILY MEDICINE

## 2019-02-05 PROCEDURE — 99214 OFFICE O/P EST MOD 30 MIN: CPT | Performed by: FAMILY MEDICINE

## 2019-02-05 PROCEDURE — 90674 CCIIV4 VAC NO PRSV 0.5 ML IM: CPT | Performed by: FAMILY MEDICINE

## 2019-02-05 RX ORDER — TRIAMCINOLONE ACETONIDE 40 MG/ML
80 INJECTION, SUSPENSION INTRA-ARTICULAR; INTRAMUSCULAR ONCE
Status: COMPLETED | OUTPATIENT
Start: 2019-02-05 | End: 2019-02-05

## 2019-02-05 RX ADMIN — TRIAMCINOLONE ACETONIDE 80 MG: 40 INJECTION, SUSPENSION INTRA-ARTICULAR; INTRAMUSCULAR at 15:04

## 2019-02-05 NOTE — PROGRESS NOTES
"Austin is a 44 y.o. year old male    Chief Complaint   Patient presents with   • Right Shoulder - Pain       History of Present Illness  Follow-up on right shoulder pain.  No change in symptoms.  Night pain is unfortunate worsening.  Had synchronized cardioversion performed 1/25/19 and is now in sinus rhythm.  Was not given any contraindications to receiving cortisone injection.  Requests that today.    I have reviewed the patient's medical, family, and social history in detail and updated the computerized patient record.    Review of Systems  Constitutional: Negative for fever.   Musculoskeletal:        Per HPI   Skin: Negative for rash.   Neurological: Negative for weakness and numbness.   Psychiatric/Behavioral: Negative for sleep disturbance.   All other systems reviewed and are negative.    /68   Ht 182.9 cm (72\")   Wt 119 kg (262 lb)   BMI 35.53 kg/m²      Physical Exam    Vital signs reviewed.   General: No acute distress.  Eyes: conjunctiva clear; pupils equally round and reactive  ENT: external ears and nose atraumatic; oropharynx clear  CV: no peripheral edema, 2+ distal pulses  Resp: normal respiratory effort, no use of accessory muscles  Skin: no rashes or wounds; normal turgor  Psych: mood and affect appropriate; recent and remote memory intact  Neuro: sensation to light touch intact    MSK Exam:    Right shoulder demonstrates limited range of motion due to pain.  Negative drop arm.  Positive Trevino.  Negative empty can.    Shoulder Injection Procedure Note    Right shoulder subacromial bursa injection was discussed with the patient in detail, including indication, risks, benefits, and alternatives. Verbal consent was given for the procedure. Injection was performed by physician.  Injection site was identified by physical examination and cleaned with Betadine and alcohol swabs. Prior to needle insertion, ethyl chloride spray was used for surface anesthesia. Sterile technique was used.  A " "22-gauge, 1.5\" needle was directed to the joint from a(n) posterior approach. Injectate was passed into the subacromial bursa without difficulty. The needle was removed and a simple bandage was applied. The procedure was tolerated well without difficulty.    Injection mixture:  1% lidocaine without epinephrine: 4 mL  40 mg/mL triamcinolone acetonide: 2 mL    Diagnoses and all orders for this visit:    Acute pain of right shoulder  -     triamcinolone acetonide (KENALOG-40) injection 80 mg; Inject 2 mL into the appropriate joint as directed by provider 1 (One) Time.    Calcific tendinitis of right shoulder  -     triamcinolone acetonide (KENALOG-40) injection 80 mg; Inject 2 mL into the appropriate joint as directed by provider 1 (One) Time.      Postprocedural instructions given.  If he is still having considerable symptoms then to 3 weeks, to call back and consider MRI.    EMR Dragon/Transcription disclaimer:    Much of this encounter note is an electronic transcription/translation of spoken language to printed text.  The electronic translation of spoken language may permit erroneous, or at times, nonsensical words or phrases to be inadvertently transcribed.  Although I have reviewed the note for such errors some may still exist.   "

## 2019-02-11 ENCOUNTER — OFFICE VISIT (OUTPATIENT)
Dept: FAMILY MEDICINE CLINIC | Facility: CLINIC | Age: 44
End: 2019-02-11

## 2019-02-11 VITALS
HEART RATE: 74 BPM | BODY MASS INDEX: 35.89 KG/M2 | HEIGHT: 72 IN | SYSTOLIC BLOOD PRESSURE: 120 MMHG | DIASTOLIC BLOOD PRESSURE: 70 MMHG | WEIGHT: 265 LBS | OXYGEN SATURATION: 96 %

## 2019-02-11 DIAGNOSIS — R33.9 URINARY RETENTION: ICD-10-CM

## 2019-02-11 DIAGNOSIS — N41.0 ACUTE PROSTATITIS WITHOUT HEMATURIA: Primary | ICD-10-CM

## 2019-02-11 PROCEDURE — 99214 OFFICE O/P EST MOD 30 MIN: CPT | Performed by: NURSE PRACTITIONER

## 2019-02-11 RX ORDER — SULFAMETHOXAZOLE AND TRIMETHOPRIM 800; 160 MG/1; MG/1
1 TABLET ORAL 2 TIMES DAILY
Qty: 20 TABLET | Refills: 0 | Status: SHIPPED | OUTPATIENT
Start: 2019-02-11 | End: 2019-02-21

## 2019-02-11 RX ORDER — TAMSULOSIN HYDROCHLORIDE 0.4 MG/1
1 CAPSULE ORAL NIGHTLY
Qty: 30 CAPSULE | Refills: 3 | Status: SHIPPED | OUTPATIENT
Start: 2019-02-11 | End: 2019-03-13

## 2019-02-11 NOTE — PROGRESS NOTES
Subjective   Austin Orta is a 44 y.o. male.     Urinary frequency nighttime 3-4 times last several weeks  Thinks he has prostatitis  Gets this occasionally sees urology appointment in March  No fever chills no pain  Symptoms not during the day  Nothing different  Flomax previously helped as well as previously taken Flomax for stones  No hematuria no dysuria frequency urgency during the day  Did well with Bactrim previously    No more seizures doing well all follow-ups           The following portions of the patient's history were reviewed and updated as appropriate: allergies, current medications, past family history, past medical history, past social history, past surgical history and problem list.    Review of Systems    Objective   Physical Exam   Constitutional: He is oriented to person, place, and time. He appears well-developed and well-nourished. No distress.   HENT:   Head: Normocephalic and atraumatic.   Nose: Nose normal.   Mouth/Throat: Oropharynx is clear and moist.   Eyes: Conjunctivae are normal. Pupils are equal, round, and reactive to light. No scleral icterus.   Neck: Neck supple. No JVD present. No thyromegaly present.   Cardiovascular: Normal rate, regular rhythm and normal heart sounds. Exam reveals no gallop and no friction rub.   No murmur heard.  Pulmonary/Chest: Effort normal and breath sounds normal. No respiratory distress. He has no wheezes. He has no rales.   Abdominal: Soft. Bowel sounds are normal. He exhibits no distension and no mass. There is no tenderness. There is no guarding. No hernia.   No CVA tenderness   Genitourinary:   Genitourinary Comments: Offered exam patient declines  Treating empirically   Musculoskeletal: He exhibits no edema or tenderness.   Lymphadenopathy:     He has no cervical adenopathy.   Neurological: He is alert and oriented to person, place, and time. He has normal reflexes.   Skin: Skin is warm and dry. No rash noted. He is not diaphoretic. No erythema.    Psychiatric: He has a normal mood and affect. His behavior is normal. Judgment and thought content normal.   Vitals reviewed.        Assessment/Plan   Austin was seen today for seizure follow-up.    Diagnoses and all orders for this visit:    Acute prostatitis without hematuria    Urinary retention    Other orders  -     sulfamethoxazole-trimethoprim (BACTRIM DS) 800-160 MG per tablet; Take 1 tablet by mouth 2 (Two) Times a Day for 10 days.  -     tamsulosin (FLOMAX) 0.4 MG capsule 24 hr capsule; Take 1 capsule by mouth Every Night. As needed urinary retention                  Risk-benefit medication  Fever chills  Abdominal pain flank pain worsening symptoms emergency room    Caution  Bactrim  Avoid excessive sun  If fever rash stop urgent recheck  Follow-up urology  Call if symptoms not greatly improved in one week    Caution Flomax may decrease blood pressure orthostatic hypotension

## 2019-02-14 ENCOUNTER — OFFICE VISIT (OUTPATIENT)
Dept: CARDIOLOGY | Facility: CLINIC | Age: 44
End: 2019-02-14

## 2019-02-14 VITALS
SYSTOLIC BLOOD PRESSURE: 138 MMHG | DIASTOLIC BLOOD PRESSURE: 65 MMHG | BODY MASS INDEX: 35.76 KG/M2 | WEIGHT: 264 LBS | HEIGHT: 72 IN | HEART RATE: 71 BPM

## 2019-02-14 DIAGNOSIS — E78.5 HYPERLIPIDEMIA, UNSPECIFIED HYPERLIPIDEMIA TYPE: ICD-10-CM

## 2019-02-14 DIAGNOSIS — I48.0 PAROXYSMAL ATRIAL FIBRILLATION (HCC): Primary | ICD-10-CM

## 2019-02-14 DIAGNOSIS — Z79.01 ANTICOAGULATED: ICD-10-CM

## 2019-02-14 PROCEDURE — 93000 ELECTROCARDIOGRAM COMPLETE: CPT | Performed by: INTERNAL MEDICINE

## 2019-02-14 PROCEDURE — 99213 OFFICE O/P EST LOW 20 MIN: CPT | Performed by: INTERNAL MEDICINE

## 2019-02-14 RX ORDER — BISOPROLOL FUMARATE 5 MG/1
5 TABLET, FILM COATED ORAL DAILY
Qty: 30 TABLET | Refills: 6 | Status: SHIPPED | OUTPATIENT
Start: 2019-02-14 | End: 2019-08-30 | Stop reason: SDUPTHER

## 2019-02-14 NOTE — PROGRESS NOTES
Subjective:        Austin Orta is a 44 y.o. male who here for follow up    CC  POST CV    STILL FEELS WK WITH METOPROLOL  HPI  The 44-year-old with atrial ablation with a recent cardioversion complains of feeling weak and tired due to the metoprolol denies any palpitations no chest pain     Problem List Items Addressed This Visit        Cardiovascular and Mediastinum    Hyperlipidemia    Relevant Orders    Holter Monitor - 72 Hour Up To 21 Days    Atrial fibrillation (CMS/HCC) - Primary    Relevant Medications    bisoprolol (ZEBETA) 5 MG tablet    Other Relevant Orders    Holter Monitor - 72 Hour Up To 21 Days       Other    Anticoagulated    Relevant Orders    Holter Monitor - 72 Hour Up To 21 Days        .    The following portions of the patient's history were reviewed and updated as appropriate: allergies, current medications, past family history, past medical history, past social history, past surgical history and problem list.    Past Medical History:   Diagnosis Date   • Allergic     seasonal   • Anxiety    • Anxiety and depression    • Depression    • GERD (gastroesophageal reflux disease)    • History of prostatitis    • Injury of right heel 07/27/2018   • Insomnia    • Joint pain    • Kidney stones 2017 1997, 2010 also   • Left rotator cuff tear    • Prostatitis    • Right rotator cuff tear      reports that  has never smoked. he has never used smokeless tobacco. He reports that he does not drink alcohol or use drugs.   Family History   Problem Relation Age of Onset   • Cancer Mother    • Hypertension Mother    • Nephrolithiasis Sister    • Heart attack Maternal Grandmother    • Cancer Maternal Grandmother        Review of Systems  Constitutional: No wt loss, fever, fatigue  Gastrointestinal: No nausea, abdominal pain  Behavioral/Psych: No insomnia or anxiety   Cardiovascular no chest pains or palpitation  Objective:       Physical Exam  /65 (BP Location: Left arm, Patient Position: Sitting)   " Pulse 71   Ht 182.9 cm (72\")   Wt 120 kg (264 lb)   BMI 35.80 kg/m²   General appearance: No acute changes   Neck: Trachea midline; NECK, supple, no thyromegaly or lymphadenopathy   Lungs: Normal size and shape, normal breath sounds, equal distribution of air, no rales and rhonchi   CV: S1-S2 regular, no murmurs, no rub, no gallop   Abdomen: Soft, non-tender; no masses , no abnormal abdominal sounds   Extremities: No deformity , normal color , no peripheral edema   Skin: Normal temperature, turgor and texture; no rash, ulcers            ECG 12 Lead  Date/Time: 2/14/2019 11:45 AM  Performed by: Bashir Syed MD  Authorized by: Bashir Syed MD   Comparison: compared with previous ECG   Comparison to previous ECG: NOW IN NSR                Echocardiogram:        Current Outpatient Medications:   •  apixaban (ELIQUIS) 5 MG tablet tablet, Take 5 mg by mouth 2 (Two) Times a Day., Disp: , Rfl:   •  escitalopram (LEXAPRO) 10 MG tablet, Take 1 tablet by mouth Daily., Disp: 90 tablet, Rfl: 2  •  fluticasone (FLONASE SENSIMIST) 27.5 MCG/SPRAY nasal spray, 2 sprays into each nostril Daily., Disp: , Rfl:   •  levETIRAcetam (KEPPRA) 500 MG tablet, Take 1 tablet by mouth Every 12 (Twelve) Hours., Disp: 60 tablet, Rfl: 2  •  levocetirizine (XYZAL) 5 MG tablet, Take 1 tablet by mouth Every Evening., Disp: 90 tablet, Rfl: 2  •  metoprolol tartrate (LOPRESSOR) 25 MG tablet, Take 25 mg by mouth 2 (Two) Times a Day., Disp: , Rfl:   •  omeprazole (priLOSEC) 20 MG capsule, Take 1 capsule by mouth Daily., Disp: 90 capsule, Rfl: 2  •  sulfamethoxazole-trimethoprim (BACTRIM DS) 800-160 MG per tablet, Take 1 tablet by mouth 2 (Two) Times a Day for 10 days., Disp: 20 tablet, Rfl: 0  •  tamsulosin (FLOMAX) 0.4 MG capsule 24 hr capsule, Take 1 capsule by mouth Every Night. As needed urinary retention, Disp: 30 capsule, Rfl: 3  •  zolpidem (AMBIEN) 5 MG tablet, Take 2.5 mg by mouth At Night As Needed., Disp: , Rfl:    " Assessment:        Patient Active Problem List   Diagnosis   • Atopic rhinitis   • Biliary dyskinesia   • Diarrhea   • Erectile dysfunction of nonorganic origin   • Gastroesophageal reflux disease   • Insomnia   • Prostatitis   • Chronic fatigue   • Seasonal allergies   • Right shoulder pain   • Hyperlipidemia   • Non morbid obesity due to excess calories   • Eustachian tube dysfunction   • Rotator cuff arthropathy   • Viral syndrome   • Pain of both hip joints   • Abdominal pain   • Acute bilateral low back pain without sciatica   • Strain of Achilles tendon, initial encounter   • Depression   • New onset seizure (CMS/HCC)   • Atrial fibrillation (CMS/HCC)   • Cerebral cavernoma   • Anticoagulated               Plan:            ICD-10-CM ICD-9-CM   1. Paroxysmal atrial fibrillation (CMS/HCC) I48.0 427.31   2. Hyperlipidemia, unspecified hyperlipidemia type E78.5 272.4   3. Anticoagulated Z79.01 V58.61     1. Hyperlipidemia, unspecified hyperlipidemia type  Risk of the hyperlipidemia, importance of the treatment has been explained    Pros and cons of the antilipemic drugs has been explained    Regular blood workup as well as side effects including the liver failure, myelopathy death has been explained        - Holter Monitor - 72 Hour Up To 21 Days; Future    2. Paroxysmal atrial fibrillation (CMS/HCC)  Holter has been ordered  - Holter Monitor - 72 Hour Up To 21 Days; Future    3. Anticoagulated  Pros and cons as well as indication of the anticoagulation has been explained to the patient in detail    There are no obvious complications at this stage    Risk of  the bleedings has been explained    Need for the regular blood workup and adjust the dose has been explained    Need for proper follow-up on anticoagulation also has been explained    - Holter Monitor - 72 Hour Up To 21 Days; Future       SEE IN 2 MONTHS WITH ZIO PATCH 1 MONTH BEFORE, IF NO AFIB WILL DC ANTICOAGULATION    DC  LOPRESSOR      COUNSELING:    Austin Griffiths was given to patient for the following topics: diagnostic results, risk factor reductions, impressions, risks and benefits of treatment options and importance of treatment compliance .       SMOKING COUNSELING:    Counseling given: Not Answered      Dictated using Dragon dictation

## 2019-03-04 ENCOUNTER — HOSPITAL ENCOUNTER (OUTPATIENT)
Dept: MRI IMAGING | Facility: HOSPITAL | Age: 44
Discharge: HOME | End: 2019-03-04
Admitting: NURSE PRACTITIONER

## 2019-03-04 DIAGNOSIS — Q28.3 CAVERNOUS MALFORMATION: ICD-10-CM

## 2019-03-04 PROCEDURE — 0 GADOBENATE DIMEGLUMINE 529 MG/ML SOLUTION: Performed by: NURSE PRACTITIONER

## 2019-03-04 PROCEDURE — 70553 MRI BRAIN STEM W/O & W/DYE: CPT

## 2019-03-04 PROCEDURE — A9577 INJ MULTIHANCE: HCPCS | Performed by: NURSE PRACTITIONER

## 2019-03-04 RX ADMIN — GADOBENATE DIMEGLUMINE 20 ML: 529 INJECTION, SOLUTION INTRAVENOUS at 11:21

## 2019-03-13 ENCOUNTER — OFFICE VISIT (OUTPATIENT)
Dept: NEUROSURGERY | Facility: CLINIC | Age: 44
End: 2019-03-13

## 2019-03-13 VITALS
HEART RATE: 70 BPM | BODY MASS INDEX: 35.89 KG/M2 | HEIGHT: 72 IN | DIASTOLIC BLOOD PRESSURE: 70 MMHG | SYSTOLIC BLOOD PRESSURE: 114 MMHG | WEIGHT: 265 LBS | RESPIRATION RATE: 18 BRPM

## 2019-03-13 DIAGNOSIS — D18.02 VENOUS ANGIOMA OF BRAIN (HCC): ICD-10-CM

## 2019-03-13 DIAGNOSIS — Q28.3 CEREBRAL CAVERNOMA: Primary | ICD-10-CM

## 2019-03-13 PROCEDURE — 99214 OFFICE O/P EST MOD 30 MIN: CPT | Performed by: NURSE PRACTITIONER

## 2019-03-13 NOTE — PROGRESS NOTES
Subjective   Patient ID: Austin Orta is a 44 y.o. male is here today accompanied by spouse for hospital follow-up.    History of Present Illness  Patient presents for follow-up of abnormal brain imaging.  He was first evaluated as inpatient at [facility] in December 2018 during workup for seizure.  There was concern for possible ischemic stroke with hemorrhagic transformation as he was found to be in new onset atrial fibrillation or brain mass.  He presents with new MRI for review.  He is on Eliquis and Keppra. He had cardioversion on 1/25/19 with return to . He will be wearing be a heart monitor soon to re-evaluate.  He denies any seizure-like events.  He does report daily headaches.  He reports right shoulder issues following an injury during volleyball prior to his hospitalization.  He is seeing orthopedic surgery.  He has had a cortisone injection and that seems to be helping some.    The following portions of the patient's history were reviewed and updated as appropriate: allergies, current medications, past family history, past medical history, past social history, past surgical history and problem list.    Review of Systems   Eyes: Negative for visual disturbance.   Musculoskeletal: Negative for neck pain and neck stiffness.   Skin: Negative for rash.   Neurological: Positive for numbness and headaches. Negative for seizures and speech difficulty.   Psychiatric/Behavioral: Negative for sleep disturbance.       Objective   Physical Exam   Constitutional: He is oriented to person, place, and time. He appears well-developed and well-nourished.   Neck: Neck supple.   Pulmonary/Chest: Effort normal.   Musculoskeletal:        Right shoulder: He exhibits decreased range of motion.   Neurological: He is alert and oriented to person, place, and time. He has a normal Finger-Nose-Finger Test, a normal Romberg Test and a normal Tandem Gait Test. Gait normal.   Psychiatric: He has a normal mood and  affect. His speech is normal and behavior is normal. Judgment normal.   Vitals reviewed.    Neurologic Exam     Mental Status   Oriented to person, place, and time.   Follows 3 step commands.   Attention: normal. Concentration: normal.   Speech: speech is normal   Level of consciousness: alert  Knowledge: good.   Normal comprehension.     Cranial Nerves   Cranial nerves II through XII intact.     Motor Exam   Right arm pronator drift: absent  Left arm pronator drift: absent    Gait, Coordination, and Reflexes     Gait  Gait: normal    Coordination   Romberg: negative  Finger to nose coordination: normal  Tandem walking coordination: normal      Assessment/Plan   Independent Review of Radiographic Studies:    MRI brain from [facility] dated March 4, 2019 was reviewed with Dr. Morin.  This reveals a left parasagittal frontal lobe cavernous malformation as well as a small left frontal adjacent venous angioma.    Medical Decision Making:    Patient presents for follow-up of abnormal brain imaging.  He denies any additional seizure-like events since hospital admission.  He does complain of daily headaches.  He reports compliance with his Keppra and his Eliquis.  He is hoping to get off Eliquis once he has a cardiac monitor reevaluation following cardioversion.  His exam is as noted above with no neurologic red flags.  MRI shows evidence of cavernous angioma and venous anomaly.  No enhancing masses.  From the state law perspective, he is 90 days past his last seizure event.  Okay to drive.  I recommend that he wait on playing volleyball until he is cleared by neurology.  He has not been called for follow-up.  I sent a message to the neurology nurse practitioners to arrange follow-up for patient regarding continuation of his Keppra.  We will see him back on a as needed basis.  I did explain to him that sometimes cavernous angiomas can bleed.  If he has any change or sudden headaches, he should be evaluated in the  emergency room.  If he has any further seizure events, he should be in contact with neurology or any sustained seizure events or injury related seizure event should be seen in the emergency room.    Plan: Return to office as needed.  Follow-up with neurology.    Austin was seen today for hospital follow up.    Diagnoses and all orders for this visit:    Cerebral cavernoma    Venous angioma of brain (CMS/HCC)      Return if symptoms worsen or fail to improve.                  Paring Method: 15 blade scalpel Medical Necessity Clause: This procedure was medically necessary because the lesions that were treated were:

## 2019-03-14 ENCOUNTER — TELEPHONE (OUTPATIENT)
Dept: NEUROLOGY | Facility: CLINIC | Age: 44
End: 2019-03-14

## 2019-03-14 NOTE — TELEPHONE ENCOUNTER
----- Message from RASHID Pal sent at 3/13/2019  2:46 PM EDT -----  Regarding: FW: follow up  Please contact patient for f/u. Thanks.   ----- Message -----  From: Bárbara Morocho APRN  Sent: 3/13/2019  10:18 AM  To: RASHID Pal  Subject: follow up                                        He was seen in patient and DC summary says followup in 3 months. He has not been contacted.

## 2019-03-26 ENCOUNTER — OFFICE VISIT (OUTPATIENT)
Dept: NEUROLOGY | Facility: CLINIC | Age: 44
End: 2019-03-26

## 2019-03-26 VITALS
OXYGEN SATURATION: 97 % | DIASTOLIC BLOOD PRESSURE: 82 MMHG | HEIGHT: 72 IN | BODY MASS INDEX: 36.03 KG/M2 | HEART RATE: 74 BPM | WEIGHT: 266 LBS | SYSTOLIC BLOOD PRESSURE: 138 MMHG

## 2019-03-26 DIAGNOSIS — I48.0 PAROXYSMAL ATRIAL FIBRILLATION (HCC): ICD-10-CM

## 2019-03-26 DIAGNOSIS — R56.9 NEW ONSET SEIZURE (HCC): Primary | ICD-10-CM

## 2019-03-26 DIAGNOSIS — Q28.3 CAVERNOUS MALFORMATION: ICD-10-CM

## 2019-03-26 PROCEDURE — 99215 OFFICE O/P EST HI 40 MIN: CPT | Performed by: NURSE PRACTITIONER

## 2019-03-26 RX ORDER — ACETAMINOPHEN 500 MG
500 TABLET ORAL EVERY 6 HOURS PRN
COMMUNITY

## 2019-03-26 NOTE — PROGRESS NOTES
"DOS: 3/27/2019  NAME: Austin Orta   : 1975  PCP: Epley, James, APRN    Chief Complaint   Patient presents with   • Seizures      SUBJECTIVE  Neurological Problem:  44 y.o. RHW male with recent hospitalization for new onset seizure, new onset afib (currently on Eliquis) and abnormal MRI who presents today for seizure follow-up.  He is unaccompanied. Patient and problem are new to examiner. History is provided by patient and review of records that are summarized below.     Interval History:   Mr. Orta presented to [facility] on 19 with new onset seizures.  The patient reports that he was lying on the couch and his wife did also fall asleep watching a movie but the dog barking woke her up she witnessed generalized shaking and AMS for a couple of minutes.  Per EMS, patient was found to be in atrial fibrillation and had a second seizure while in route to the ED.  CT head showed a left frontal mass with follow-up MRI showed a very small cavernous malformation in the medial left subfrontal region.  He was evaluated by neurosurgery and had recent follow-up scans confirming left parasagittal frontal lobe cavernous malformation and small left frontal adjacent venous angioma. He was treated with Keppra and discharged on 500 mg BID.     He presents today and continues on Keppra 500 mg BID.  He denies any recurrent seizure activity and is back to his regular activities including driving..  He does report having more headaches since being placed on \"all these medicattions\" but they have gradually imrpoved.  He was taking tylenol daily but now only once week. Just prior to event, he was being treated with a prednisone taper by his PCP for a severe cold and right shoulder pain that he aggravated playing volleyball.  He underwent cardioversion on  and is currently wearing a cardiokey monitor.     He denies any history of migraines or previous history of seizures , A. Fib or trauma except for does report at age 9 " running into a clothes line pole and remembers his mom waking him up hourly.     He denies smoking. No alcohol use.   He is under a lot stress at work, as a  and also due to issues at home with 23 yo daughter.     Review of Systems:Review of Systems   Constitutional: Negative for activity change, appetite change and fatigue.   HENT: Negative for ear pain, facial swelling and trouble swallowing.    Eyes: Negative for photophobia, pain and visual disturbance.   Respiratory: Negative for choking, chest tightness and shortness of breath.    Cardiovascular: Negative for chest pain, palpitations and leg swelling.   Gastrointestinal: Negative for abdominal pain and constipation.   Endocrine: Negative for polydipsia, polyphagia and polyuria.   Genitourinary: Negative for difficulty urinating, frequency and urgency.   Musculoskeletal: Negative for back pain, gait problem and neck pain.   Skin: Negative for color change, rash and wound.   Allergic/Immunologic: Negative for environmental allergies, food allergies and immunocompromised state.   Neurological: Positive for seizures and light-headedness. Negative for dizziness, tremors, syncope, facial asymmetry, speech difficulty, weakness, numbness and headaches.   Hematological: Negative for adenopathy. Bruises/bleeds easily.   Psychiatric/Behavioral: Negative for agitation, behavioral problems, confusion, decreased concentration, dysphoric mood, hallucinations, self-injury, sleep disturbance and suicidal ideas. The patient is not nervous/anxious and is not hyperactive.     Above ROS reviewed    Current Medications:   Current Outpatient Medications:   •  acetaminophen (TYLENOL) 500 MG tablet, Take 500 mg by mouth Every 6 (Six) Hours As Needed for Mild Pain ., Disp: , Rfl:   •  apixaban (ELIQUIS) 5 MG tablet tablet, Take 5 mg by mouth 2 (Two) Times a Day., Disp: , Rfl:   •  bisoprolol (ZEBETA) 5 MG tablet, Take 1 tablet by mouth Daily., Disp: 30 tablet, Rfl: 6  •   escitalopram (LEXAPRO) 10 MG tablet, Take 1 tablet by mouth Daily., Disp: 90 tablet, Rfl: 2  •  fluticasone (FLONASE SENSIMIST) 27.5 MCG/SPRAY nasal spray, 2 sprays into each nostril Daily., Disp: , Rfl:   •  levETIRAcetam (KEPPRA) 500 MG tablet, Take 1 tablet by mouth Every 12 (Twelve) Hours., Disp: 60 tablet, Rfl: 2  •  levocetirizine (XYZAL) 5 MG tablet, Take 1 tablet by mouth Every Evening., Disp: 90 tablet, Rfl: 2  •  omeprazole (priLOSEC) 20 MG capsule, Take 1 capsule by mouth Daily., Disp: 90 capsule, Rfl: 2    The following portions of the patient's history were reviewed and updated as appropriate: allergies, current medications, past family history, past medical history, past social history, past surgical history and problem list.    OBJECTIVE  Vitals:    03/26/19 0900   BP: 138/82   Pulse: 74   SpO2: 97%       Diagnostics:  CT head 12/9/18: A 1. 11 mm hyperdense focus medially in the left frontal lobe suspicious  for small mass.   MRI brain 12/9/18: IMPRESSION:  13 mm lesion in the left parafalcine frontal lobe is thought  to most likely represent a cavernoma. Consider correlation with any  prior outside MRI scans.  2. No other significant findings are noted.  .    MRI brain 3/4/19: IMPRESSION:  1.  Cavernous malformation measuring 1.4 cm in size involving the medial  aspect of the left frontal lobe with no evidence of adjacent edema or  new hemorrhage.  2.  Small venous malformation involving the left frontal lobe  superolaterally. Venous malformations are considered to be developmental variants    Laboratory Results:         Lab Results   Component Value Date    WBC 11.25 (H) 12/10/2018    HGB 16.0 12/10/2018    HCT 48.6 12/10/2018    MCV 87.7 12/10/2018     12/10/2018     Lab Results   Component Value Date    GLUCOSE 120 (H) 01/25/2019    BUN 12 01/25/2019    CREATININE 0.81 01/25/2019    EGFRIFNONA 103 01/25/2019    EGFRIFAFRI 131 07/26/2018    BCR 14.8 01/25/2019    K 4.3 01/25/2019    CO2 24.8  01/25/2019    CALCIUM 9.3 01/25/2019    PROTENTOTREF 7.3 07/26/2018    ALBUMIN 3.70 12/09/2018    LABIL2 1.8 07/26/2018    AST 24 12/09/2018    ALT 30 12/09/2018     No results found for: HGBA1C  No results found for: CHOL  Lab Results   Component Value Date    HDL 39 (L) 07/26/2018    HDL 49 06/21/2016     Lab Results   Component Value Date    LDL 93 07/26/2018     (H) 06/21/2016     Lab Results   Component Value Date    TRIG 159 (H) 07/26/2018    TRIG 139 06/21/2016     No results found for: RPR  Lab Results   Component Value Date    TSH 2.270 12/09/2018     No results found for: [id number]    Physical Examination:   General Appearance:   Well developed, overweight, well groomed, alert, and cooperative.  HEENT: Normocephalic.    Neck and Spine: Normal range of motion.  Normal alignment. No mass or tenderness.   Cardiac: Regular rate and rhythm. (cardio key monitor present)  Peripheral Vasculature: Radial pulses are equal and symmetric. No signs of distal embolization.  Extremities:    No edema or deformities. Decreased ROM of right shoulder.  Skin:    No rashes or birth marks.    Neurological examination:  Higher Integrative  Function: Oriented to time, place and person. Normal registration, recall (3/3 with 1 clue), attention span and concentration. Normal language including comprehension, spontaneous speech, repetition, reading, writing, naming and vocabulary. No neglect with normal visual-spatial function and construction. Normal fund of knowledge and higher integrative function.  CN II: Pupils are equal, round, and reactive to light. Normal visual acuity and visual fields.    CN III IV VI: Extraocular movements are full without nystagmus.   CN V: Normal facial sensation and strength of muscles of mastication.  CN VII: Facial movements are symmetric. No weakness.  CN VIII:   Auditory acuity is normal.  CN IX & X:   Symmetric palatal movement.  CN XI: Sternocleidomastoid and trapezius are normal.  No  weakness.  CN XII:   The tongue is midline.  No atrophy or fasciculations.  Motor: Normal muscle strength, bulk and tone in upper and lower extremities.  No fasciculations, rigidity, spasticity, or abnormal movements.  Reflexes: 1+ in the upper and lower extremities.   Sensation: Normal to light touch, pinprick, vibration, temperature, and proprioception in arms and legs. Normal graphesthesia and no extinction on DSS.  Station and Gait: Normal gait and station.    Coordination: Finger to nose test shows no dysmetria.  Heel to shin normal.    Impression:  Mr. Orta presents for recent hospital admission with new onset seizures, new onset afib and findings of left frontal cavernoma. He has been maintained on Keppra 500 mg BID with no recurrent seizure activity and is tolerating well. I reviewed imaging with Dr. Dennison, patient will need to stay on Keppra 500 mg BID, will check Keppra level. If he has continued headaches, would consider transitioning to alternate AED, topamax for additional headache prophylaxis. He has been seizure-free for 3 months, therefore he is able to drive. F/U in 6 months, sooner if any breakthrough seizure activity.     Plan:     Continue Keppra 500 mg BID  Consider alternate AED, ie topamax, if continued headache  Check Keppra level  F/U in 6 months.     I spent 40 minutes face to face with patient, with  > 50% spent counseling patient regarding diagnosis, review of diagnostics, personal risk factors, AED treatments, purpose, risks and side-effects.     Austin was seen today for seizures.    Diagnoses and all orders for this visit:    New onset seizure (CMS/HCC)  -     Levetiracetam Level (Keppra); Future    Cavernous malformation    Paroxysmal atrial fibrillation (CMS/HCC)        Coding      Dictated using Dragon

## 2019-03-28 ENCOUNTER — TELEPHONE (OUTPATIENT)
Dept: NEUROLOGY | Facility: CLINIC | Age: 44
End: 2019-03-28

## 2019-03-28 NOTE — TELEPHONE ENCOUNTER
Spoke with patient. Informed him that you would be discussing with Dr Dennison. He will come into the [place] lab tomorrow for Keppra level

## 2019-03-28 NOTE — TELEPHONE ENCOUNTER
----- Message from RASHID Pal sent at 3/27/2019  1:55 PM EDT -----  Can you let patient know that I would like I discussed his case with Dr. Dennison and I would like him to have a keppra level checked at his convenience, no rush. Order is in, he can come by outpatient lab when he is nearby. Thanks.

## 2019-03-29 ENCOUNTER — LAB (OUTPATIENT)
Dept: LAB | Facility: HOSPITAL | Age: 44
End: 2019-03-29

## 2019-03-29 DIAGNOSIS — R56.9 NEW ONSET SEIZURE (HCC): ICD-10-CM

## 2019-03-29 PROCEDURE — 36415 COLL VENOUS BLD VENIPUNCTURE: CPT

## 2019-03-29 PROCEDURE — 80177 DRUG SCRN QUAN LEVETIRACETAM: CPT | Performed by: NURSE PRACTITIONER

## 2019-04-03 LAB — LEVETIRACETAM SERPL-MCNC: 8.5 UG/ML (ref 10–40)

## 2019-04-11 ENCOUNTER — OFFICE VISIT (OUTPATIENT)
Dept: CARDIOLOGY | Facility: CLINIC | Age: 44
End: 2019-04-11

## 2019-04-11 VITALS
BODY MASS INDEX: 36.03 KG/M2 | HEART RATE: 73 BPM | HEIGHT: 72 IN | DIASTOLIC BLOOD PRESSURE: 88 MMHG | WEIGHT: 266 LBS | SYSTOLIC BLOOD PRESSURE: 121 MMHG

## 2019-04-11 DIAGNOSIS — E78.5 HYPERLIPIDEMIA, UNSPECIFIED HYPERLIPIDEMIA TYPE: ICD-10-CM

## 2019-04-11 DIAGNOSIS — Z79.01 ANTICOAGULATED: ICD-10-CM

## 2019-04-11 DIAGNOSIS — I48.0 PAROXYSMAL ATRIAL FIBRILLATION (HCC): Primary | ICD-10-CM

## 2019-04-11 PROCEDURE — 99213 OFFICE O/P EST LOW 20 MIN: CPT | Performed by: INTERNAL MEDICINE

## 2019-04-11 NOTE — PROGRESS NOTES
Subjective:        Austin Orta is a 44 y.o. male who here for follow up    CC  The follow-up of the atrial fibrillation, hyperlipidemia and anticoagulation  HPI  44-year-old male with known history of paroxysmal atrial fibrillation, hyperlipidemia on chronic anticoagulation here for the follow-up with no complaints of chest pains or tightness in the chest    Recent Holter monitor was normal     Problem List Items Addressed This Visit        Cardiovascular and Mediastinum    Paroxysmal atrial fibrillation (CMS/HCC) - Primary    Relevant Orders    Case Request Cath Lab: Loop insertion (Completed)    Hyperlipidemia       Other    Anticoagulated        .Interpretation Summary     · A normal monitor study.         The following portions of the patient's history were reviewed and updated as appropriate: allergies, current medications, past family history, past medical history, past social history, past surgical history and problem list.    Past Medical History:   Diagnosis Date   • Allergic     seasonal   • Anxiety    • Anxiety and depression    • Cavernous malformation    • Depression    • GERD (gastroesophageal reflux disease)    • History of prostatitis    • Injury of right heel 07/27/2018   • Insomnia    • Joint pain    • Kidney stones 2017 1997, 2010 also   • Left rotator cuff tear    • Prostatitis    • Right rotator cuff tear    • Venous angioma of brain (CMS/HCC)      reports that he has never smoked. He has never used smokeless tobacco. He reports that he does not drink alcohol or use drugs.   Family History   Problem Relation Age of Onset   • Cancer Mother    • Hypertension Mother    • Nephrolithiasis Sister    • Heart attack Maternal Grandmother    • Cancer Maternal Grandmother    • Skin cancer Father    • Heart attack Maternal Grandfather    • Heart disease Maternal Grandfather    • Dementia Paternal Grandmother    • Alzheimer's disease Paternal Grandmother    • Skin cancer Paternal Grandfather   "      Review of Systems  Constitutional: No wt loss, fever, fatigue  Gastrointestinal: No nausea, abdominal pain  Behavioral/Psych: No insomnia or anxiety   Cardiovascular no chest pains or tightness in the chest  Objective:       Physical Exam  /88   Pulse 73   Ht 182.9 cm (72\")   Wt 121 kg (266 lb)   BMI 36.08 kg/m²   General appearance: No acute changes   Neck: Trachea midline; NECK, supple, no thyromegaly or lymphadenopathy   Lungs: Normal size and shape, normal breath sounds, equal distribution of air, no rales and rhonchi   CV: S1-S2 regular, no murmurs, no rub, no gallop   Abdomen: Soft, non-tender; no masses , no abnormal abdominal sounds   Extremities: No deformity , normal color , no peripheral edema   Skin: Normal temperature, turgor and texture; no rash, ulcers          Procedures      Echocardiogram:        Current Outpatient Medications:   •  acetaminophen (TYLENOL) 500 MG tablet, Take 500 mg by mouth Every 6 (Six) Hours As Needed for Mild Pain ., Disp: , Rfl:   •  apixaban (ELIQUIS) 5 MG tablet tablet, Take 5 mg by mouth 2 (Two) Times a Day., Disp: , Rfl:   •  bisoprolol (ZEBETA) 5 MG tablet, Take 1 tablet by mouth Daily., Disp: 30 tablet, Rfl: 6  •  escitalopram (LEXAPRO) 10 MG tablet, Take 1 tablet by mouth Daily., Disp: 90 tablet, Rfl: 2  •  fluticasone (FLONASE SENSIMIST) 27.5 MCG/SPRAY nasal spray, 2 sprays into each nostril Daily., Disp: , Rfl:   •  levETIRAcetam (KEPPRA) 500 MG tablet, Take 1 tablet by mouth Every 12 (Twelve) Hours., Disp: 60 tablet, Rfl: 2  •  levocetirizine (XYZAL) 5 MG tablet, Take 1 tablet by mouth Every Evening., Disp: 90 tablet, Rfl: 2  •  omeprazole (priLOSEC) 20 MG capsule, Take 1 capsule by mouth Daily., Disp: 90 capsule, Rfl: 2   Assessment:        Patient Active Problem List   Diagnosis   • Atopic rhinitis   • Biliary dyskinesia   • Diarrhea   • Erectile dysfunction of nonorganic origin   • Gastroesophageal reflux disease   • Insomnia   • Prostatitis   • " Chronic fatigue   • Seasonal allergies   • Right shoulder pain   • Hyperlipidemia   • Non morbid obesity due to excess calories   • Eustachian tube dysfunction   • Rotator cuff arthropathy   • Viral syndrome   • Pain of both hip joints   • Abdominal pain   • Acute bilateral low back pain without sciatica   • Strain of Achilles tendon, initial encounter   • Depression   • New onset seizure (CMS/HCC)   • Atrial fibrillation (CMS/HCC)   • Cavernous malformation   • Anticoagulated   • Venous angioma of brain (CMS/HCC)               Plan:            ICD-10-CM ICD-9-CM   1. Paroxysmal atrial fibrillation (CMS/HCC) I48.0 427.31   2. Hyperlipidemia, unspecified hyperlipidemia type E78.5 272.4   3. Anticoagulated Z79.01 V58.61     1. Paroxysmal atrial fibrillation (CMS/HCC)  Pros and cons of anticoagulation has been discussed will discontinue, and monitor with the loop insertion  - Case Request Cath Lab: Loop insertion    2. Hyperlipidemia, unspecified hyperlipidemia type  Counseling done    3. Anticoagulated  At this point it will be stopped but patient will be watched carefully with loop recorder implantation procedure risks and options have been explained       Dc eliquise    Start asa   Will proceed with linq as pt worried about stroke, and afib  COUNSELING:    Austin Griffiths was given to patient for the following topics: diagnostic results, risk factor reductions, impressions, risks and benefits of treatment options and importance of treatment compliance .       SMOKING COUNSELING:    Counseling given: Not Answered      Dictated using Dragon dictation

## 2019-04-17 ENCOUNTER — TELEPHONE (OUTPATIENT)
Dept: FAMILY MEDICINE CLINIC | Facility: CLINIC | Age: 44
End: 2019-04-17

## 2019-04-17 ENCOUNTER — TELEPHONE (OUTPATIENT)
Dept: NEUROLOGY | Facility: CLINIC | Age: 44
End: 2019-04-17

## 2019-04-17 NOTE — TELEPHONE ENCOUNTER
Please call patient pharmacy sent over caution regarding meloxicam request which is an anti-inflammatory pain medicine but also increases risk of bleeding and the drug interaction Eliquis the should not be taken together I may be mistaking but I am not sure if we discussed this in the office?    If so please refresh my memory otherwise we should avoid this      Tylenol course is safer  However if he requires an NSAID  We could consider diclofenac gel  Which still have some absorption but likely much less risk than oral    He can apply this on large joints    Please advise

## 2019-04-17 NOTE — TELEPHONE ENCOUNTER
----- Message from Ruby Berg sent at 4/17/2019 10:34 AM EDT -----  Contact: [phone]  Patient wants to know if he can take a Xanx tomorrow, he's getting on a plane to head out of town. He states that he is on Keppra and wants to make sure its okay for him to take. Also his Keppra level popped up on his [employer] account. He'd like for Italia to explain to him what the level means.

## 2019-04-18 NOTE — TELEPHONE ENCOUNTER
Can you let patient know that it is okay for him to take Xanax. Also I was waiting for Dr. Dennison to review the results of his Keppra level and he recommends that we increase his dose to Keppra 750 mg BID since the level was on the low side. Thanks.

## 2019-04-30 RX ORDER — OMEPRAZOLE 20 MG/1
20 CAPSULE, DELAYED RELEASE ORAL DAILY
Qty: 90 CAPSULE | Refills: 2 | Status: SHIPPED | OUTPATIENT
Start: 2019-04-30 | End: 2020-01-24

## 2019-05-01 RX ORDER — LEVETIRACETAM 750 MG/1
750 TABLET ORAL 2 TIMES DAILY
Qty: 60 TABLET | Refills: 3 | Status: SHIPPED | OUTPATIENT
Start: 2019-05-01 | End: 2019-08-22 | Stop reason: SDUPTHER

## 2019-05-23 DIAGNOSIS — G89.29 CHRONIC RIGHT SHOULDER PAIN: Primary | ICD-10-CM

## 2019-05-23 DIAGNOSIS — M25.511 CHRONIC RIGHT SHOULDER PAIN: Primary | ICD-10-CM

## 2019-06-10 RX ORDER — MELOXICAM 15 MG/1
15 TABLET ORAL DAILY
Qty: 90 TABLET | Refills: 0 | Status: SHIPPED | OUTPATIENT
Start: 2019-06-10 | End: 2019-10-15 | Stop reason: SDUPTHER

## 2019-06-19 ENCOUNTER — HOSPITAL ENCOUNTER (OUTPATIENT)
Dept: GENERAL RADIOLOGY | Facility: HOSPITAL | Age: 44
Discharge: HOME | End: 2019-06-19
Admitting: RADIOLOGY

## 2019-06-19 ENCOUNTER — HOSPITAL ENCOUNTER (OUTPATIENT)
Dept: MRI IMAGING | Facility: HOSPITAL | Age: 44
Discharge: HOME | End: 2019-06-19

## 2019-06-19 DIAGNOSIS — G89.29 CHRONIC RIGHT SHOULDER PAIN: ICD-10-CM

## 2019-06-19 DIAGNOSIS — M25.511 CHRONIC RIGHT SHOULDER PAIN: ICD-10-CM

## 2019-06-19 PROCEDURE — 25010000002 IOPAMIDOL 61 % SOLUTION: Performed by: RADIOLOGY

## 2019-06-19 PROCEDURE — 0 GADOBENATE DIMEGLUMINE 529 MG/ML SOLUTION: Performed by: RADIOLOGY

## 2019-06-19 PROCEDURE — 77002 NEEDLE LOCALIZATION BY XRAY: CPT

## 2019-06-19 PROCEDURE — A9577 INJ MULTIHANCE: HCPCS | Performed by: RADIOLOGY

## 2019-06-19 PROCEDURE — 73222 MRI JOINT UPR EXTREM W/DYE: CPT

## 2019-06-19 RX ORDER — LIDOCAINE HYDROCHLORIDE 10 MG/ML
10 INJECTION, SOLUTION INFILTRATION; PERINEURAL ONCE
Status: COMPLETED | OUTPATIENT
Start: 2019-06-19 | End: 2019-06-19

## 2019-06-19 RX ADMIN — GADOBENATE DIMEGLUMINE 0.05 ML: 529 INJECTION, SOLUTION INTRAVENOUS at 10:06

## 2019-06-19 RX ADMIN — IOPAMIDOL 5 ML: 612 INJECTION, SOLUTION INTRAVENOUS at 10:06

## 2019-06-19 RX ADMIN — LIDOCAINE HYDROCHLORIDE 3 ML: 10 INJECTION, SOLUTION INFILTRATION; PERINEURAL at 10:06

## 2019-06-20 NOTE — PROGRESS NOTES
MRI confirms calcific tendinitis of supraspinatus tendon which we saw on his XR. No tear. Additionally. AC OA. Can f/up in office to discuss treatment options further.

## 2019-06-25 ENCOUNTER — OFFICE VISIT (OUTPATIENT)
Dept: SPORTS MEDICINE | Facility: CLINIC | Age: 44
End: 2019-06-25

## 2019-06-25 VITALS
WEIGHT: 260 LBS | DIASTOLIC BLOOD PRESSURE: 68 MMHG | BODY MASS INDEX: 36.4 KG/M2 | HEIGHT: 71 IN | SYSTOLIC BLOOD PRESSURE: 124 MMHG

## 2019-06-25 DIAGNOSIS — M19.011 OSTEOARTHRITIS OF RIGHT AC (ACROMIOCLAVICULAR) JOINT: ICD-10-CM

## 2019-06-25 DIAGNOSIS — M75.31 CALCIFIC TENDINITIS OF RIGHT SHOULDER: Primary | ICD-10-CM

## 2019-06-25 PROCEDURE — 99214 OFFICE O/P EST MOD 30 MIN: CPT | Performed by: FAMILY MEDICINE

## 2019-06-25 PROCEDURE — 20611 DRAIN/INJ JOINT/BURSA W/US: CPT | Performed by: FAMILY MEDICINE

## 2019-06-25 RX ORDER — ASPIRIN 81 MG/1
81 TABLET, CHEWABLE ORAL DAILY
COMMUNITY

## 2019-06-25 RX ORDER — TRIAMCINOLONE ACETONIDE 40 MG/ML
20 INJECTION, SUSPENSION INTRA-ARTICULAR; INTRAMUSCULAR ONCE
Status: COMPLETED | OUTPATIENT
Start: 2019-06-25 | End: 2019-06-25

## 2019-06-25 RX ADMIN — TRIAMCINOLONE ACETONIDE 20 MG: 40 INJECTION, SUSPENSION INTRA-ARTICULAR; INTRAMUSCULAR at 14:22

## 2019-06-25 NOTE — PROGRESS NOTES
"Austin is a 44 y.o. year old male    Chief Complaint   Patient presents with   • Right Shoulder - Follow-up, Pain     MRI results         History of Present Illness  F/up R shoulder pain. Worsening. Pain mostly along top of shoulder. Stopped meloxicam and pain worsened. Can't scratch back due to pain. Here to discuss MRI results. Requests inj.    I have reviewed the patient's medical, family, and social history in detail and updated the computerized patient record.    Review of Systems  Constitutional: Negative for fever.   Musculoskeletal:        Per HPI   Skin: Negative for rash.   Neurological: Negative for weakness and numbness.   Psychiatric/Behavioral: Negative for sleep disturbance.   All other systems reviewed and are negative.    /68   Ht 179.1 cm (70.5\")   Wt 118 kg (260 lb)   BMI 36.78 kg/m²      Physical Exam    Vital signs reviewed.   General: No acute distress.  Eyes: conjunctiva clear; pupils equally round and reactive  ENT: external ears and nose atraumatic; oropharynx clear  CV: no peripheral edema, 2+ distal pulses  Resp: normal respiratory effort, no use of accessory muscles  Skin: no rashes or wounds; normal turgor  Psych: mood and affect appropriate; recent and remote memory intact  Neuro: sensation to light touch intact    MSK Exam:    R shoulder: no tenderness or warmth; full ROM; negative [place]'s; negative empty can; negative liftoff; + Trevino; + scarf  L shoulder: no tenderness or warmth; full ROM; negative [place]'s; negative empty can    MR R shoulder wo : calcific tendinitis supraspinatus; AC OA.    Ultrasound-Guided Shoulder Injection Procedure Note    Right shoulder injection was discussed with the patient in detail, including indication, risks, benefits, and alternatives. Verbal consent was given for the procedure. Injection was performed by physician.  Injection site was identified by ultrasound examination, then cleaned with Betadine and alcohol swabs. Prior to needle " "insertion, ethyl chloride spray was used for surface anesthesia. Sterile technique was used. Ultrasound guidance was indicated for injection accuracy.  A 25-gauge, 1.5\" needle was guided to the acromioclavicular joint under continuous direct ultrasound visualization. Injectate was seen filing the space and passed without difficulty. The needle was removed and a simple bandage was applied. The procedure was tolerated well without difficulty.    Injection mixture:  1% lidocaine without epinephrine: 0.5 mL  40 mg/mL triamcinolone acetonide: 0.5 mL    Diagnoses and all orders for this visit:    Calcific tendinitis of right shoulder    Osteoarthritis of right AC (acromioclavicular) joint  -     triamcinolone acetonide (KENALOG-40) injection 20 mg    Other orders  -     aspirin (ASPIRIN 81) 81 MG chewable tablet; Chew 81 mg Daily.      Tolerated injection well to the acromioclavicular joint.  Post procedure instructions given.  Discussed long-term management of AC OA as well as calcific tendinitis.  Follow-up PRN.    EMR Dragon/Transcription disclaimer:    Much of this encounter note is an electronic transcription/translation of spoken language to printed text.  The electronic translation of spoken language may permit erroneous, or at times, nonsensical words or phrases to be inadvertently transcribed.  Although I have reviewed the note for such errors some may still exist.   "

## 2019-07-29 DIAGNOSIS — F32.A DEPRESSION, UNSPECIFIED DEPRESSION TYPE: ICD-10-CM

## 2019-07-29 RX ORDER — ESCITALOPRAM OXALATE 10 MG/1
TABLET ORAL
Qty: 90 TABLET | Refills: 0 | Status: SHIPPED | OUTPATIENT
Start: 2019-07-29 | End: 2019-11-26 | Stop reason: SDUPTHER

## 2019-07-29 RX ORDER — LEVOCETIRIZINE DIHYDROCHLORIDE 5 MG/1
TABLET, FILM COATED ORAL
Qty: 90 TABLET | Refills: 0 | Status: SHIPPED | OUTPATIENT
Start: 2019-07-29 | End: 2019-12-23

## 2019-08-22 RX ORDER — LEVETIRACETAM 750 MG/1
TABLET ORAL
Qty: 60 TABLET | Refills: 3 | Status: SHIPPED | OUTPATIENT
Start: 2019-08-22 | End: 2019-09-27 | Stop reason: ALTCHOICE

## 2019-08-29 ENCOUNTER — OFFICE VISIT (OUTPATIENT)
Dept: FAMILY MEDICINE CLINIC | Facility: CLINIC | Age: 44
End: 2019-08-29

## 2019-08-29 VITALS
HEART RATE: 51 BPM | WEIGHT: 266 LBS | OXYGEN SATURATION: 98 % | DIASTOLIC BLOOD PRESSURE: 64 MMHG | TEMPERATURE: 98 F | SYSTOLIC BLOOD PRESSURE: 116 MMHG | BODY MASS INDEX: 37.24 KG/M2 | HEIGHT: 71 IN

## 2019-08-29 DIAGNOSIS — F32.A DEPRESSION, UNSPECIFIED DEPRESSION TYPE: ICD-10-CM

## 2019-08-29 DIAGNOSIS — R40.0 DAYTIME SOMNOLENCE: ICD-10-CM

## 2019-08-29 DIAGNOSIS — Z00.00 HEALTH MAINTENANCE EXAMINATION: Primary | ICD-10-CM

## 2019-08-29 DIAGNOSIS — N64.4 BREAST TENDERNESS IN MALE: Primary | ICD-10-CM

## 2019-08-29 DIAGNOSIS — I48.0 PAROXYSMAL ATRIAL FIBRILLATION (HCC): ICD-10-CM

## 2019-08-29 PROCEDURE — 99214 OFFICE O/P EST MOD 30 MIN: CPT | Performed by: NURSE PRACTITIONER

## 2019-08-29 NOTE — PROGRESS NOTES
Subjective   Austin Orta is a 44 y.o. male.     Pleasant gentleman here today complains of mild tenderness slightly inferior to right nipple of his breast approximately 3 weeks  No nipple discharge  No increased pain or swelling around the nipple area Suzette  No axilla discomfort  Otherwise no chest pain shortness of breath not exercise related  He touches his chest he feels some localized tenderness    No personal history of breast cancer his mother was recently diagnosed with breast cancer      Follow-up anxiety takes Lexapro he is been doing well with this although he did have some increased irritability after his Keppra was increased with neurology but it is manageable he does not wish to change Lexapro at this time  Seizures are managed presently no new seizure      Depression anxiety stable Lexapro 10 mg daily wants to continue                   The following portions of the patient's history were reviewed and updated as appropriate: allergies, current medications, past family history, past medical history, past social history, past surgical history and problem list.    Review of Systems   Constitutional: Negative for fatigue and fever.   HENT: Negative.  Negative for trouble swallowing.    Eyes: Negative.    Respiratory: Negative.  Negative for cough and shortness of breath.    Cardiovascular: Negative for chest pain, palpitations and leg swelling.   Gastrointestinal: Negative.  Negative for abdominal pain.   Genitourinary: Negative.    Musculoskeletal: Negative.    Skin: Negative.    Neurological: Negative.  Negative for dizziness and confusion.   Psychiatric/Behavioral: Negative.    All other systems reviewed and are negative.      Objective   Physical Exam   Constitutional: He appears well-developed and well-nourished.   HENT:   Head: Normocephalic and atraumatic.   Eyes: Conjunctivae are normal. Pupils are equal, round, and reactive to light.   Neck: Neck supple.   Cardiovascular: Normal rate and regular  rhythm.   Pulmonary/Chest: Effort normal and breath sounds normal.   Breast exam breast equal in size mild gynecomastia secondary to obesity  No nipple discharge no suspicious nodules or masses  Able to locate the area of concern small subtle mildly flat smooth cystlike compressible cystlike nodule less than 1/3 cm approximately to 1/2 cm  Immediately inferior to the area Portia right side approximately 1/2 cm  Mildly tender no surrounding nodules no surrounding masses  No dimpling no redness skin appears normal and smooth axilla is clear bilateral without lymphadenopathy   Skin: Skin is warm and dry. No rash noted. No erythema.   Psychiatric: He has a normal mood and affect. His behavior is normal. Judgment and thought content normal.   Vitals reviewed.        Assessment/Plan   Austin was seen today for pain behind right nipple x 3 weeks.    Diagnoses and all orders for this visit:    Breast tenderness in male    Daytime somnolence  -     Ambulatory Referral to Sleep Medicine    Paroxysmal atrial fibrillation (CMS/HCC)  Comments:  Past history now sinus rhythm status post cardioversion       Right breast tenderness 3 weeks without mass  Likely hormonal related to obesity  Follow-up as below important  Healthy diet regular exercise and decrease calories 2000 audra a day mostly vegetables chicken fish decrease processed foods and breads pasta    Patient has adipose tissue around his upper chest and breast and abdomen   No abnormally enlarged breast compared to his abdominal obesity  It all appears consistent with obesity as opposed to any true gynecomastia  Discussed this with patient and appropriate work-up  And need for weight loss          Discussed evaluating patient now with a mammogram diagnostic versus close follow-up  Patient prefers close follow-up    He can recheck in 6 weeks if this is completely gone and he is sure that he does not feel anything tender or any suspicious lumps he will call me or let me know by  email otherwise recheck in the office in 6 weeks any increasing pain redness nipple discharge such as bloody or purulent yellow-green discharge urgent recheck    Any worry or possible nodule that has not completely resolved he should get further work-up and/or see a breast surgeon    Fasting labs today  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

## 2019-08-30 RX ORDER — BISOPROLOL FUMARATE 5 MG/1
5 TABLET, FILM COATED ORAL DAILY
Qty: 30 TABLET | Refills: 6 | Status: SHIPPED | OUTPATIENT
Start: 2019-08-30 | End: 2020-04-06

## 2019-09-03 LAB
ALBUMIN SERPL-MCNC: 5 G/DL (ref 3.5–5.2)
ALBUMIN/GLOB SERPL: 2.4 G/DL
ALP SERPL-CCNC: 87 U/L (ref 39–117)
ALT SERPL-CCNC: 17 U/L (ref 1–41)
APPEARANCE UR: CLEAR
AST SERPL-CCNC: 17 U/L (ref 1–40)
BASOPHILS # BLD AUTO: 0.07 10*3/MM3 (ref 0–0.2)
BASOPHILS NFR BLD AUTO: 0.7 % (ref 0–1.5)
BILIRUB SERPL-MCNC: 0.8 MG/DL (ref 0.2–1.2)
BILIRUB UR QL STRIP: NEGATIVE
BUN SERPL-MCNC: 18 MG/DL (ref 6–20)
BUN/CREAT SERPL: 24.3 (ref 7–25)
CALCIUM SERPL-MCNC: 9.5 MG/DL (ref 8.6–10.5)
CHLORIDE SERPL-SCNC: 97 MMOL/L (ref 98–107)
CHOLEST SERPL-MCNC: 156 MG/DL (ref 0–200)
CO2 SERPL-SCNC: 25.7 MMOL/L (ref 22–29)
COLOR UR: YELLOW
CREAT SERPL-MCNC: 0.74 MG/DL (ref 0.76–1.27)
EOSINOPHIL # BLD AUTO: 0.13 10*3/MM3 (ref 0–0.4)
EOSINOPHIL NFR BLD AUTO: 1.3 % (ref 0.3–6.2)
ERYTHROCYTE [DISTWIDTH] IN BLOOD BY AUTOMATED COUNT: 12.6 % (ref 12.3–15.4)
GLOBULIN SER CALC-MCNC: 2.1 GM/DL
GLUCOSE SERPL-MCNC: 91 MG/DL (ref 65–99)
GLUCOSE UR QL: NEGATIVE
HCT VFR BLD AUTO: 50.8 % (ref 37.5–51)
HDLC SERPL-MCNC: 41 MG/DL (ref 40–60)
HGB BLD-MCNC: 15.6 G/DL (ref 13–17.7)
HGB UR QL STRIP: NEGATIVE
IMM GRANULOCYTES # BLD AUTO: 0.04 10*3/MM3 (ref 0–0.05)
IMM GRANULOCYTES NFR BLD AUTO: 0.4 % (ref 0–0.5)
KETONES UR QL STRIP: NEGATIVE
LDLC SERPL CALC-MCNC: 96 MG/DL (ref 0–100)
LDLC/HDLC SERPL: 2.33 {RATIO}
LEUKOCYTE ESTERASE UR QL STRIP: NEGATIVE
LEVETIRACETAM SERPL-MCNC: 12.6 UG/ML (ref 10–40)
LYMPHOCYTES # BLD AUTO: 3.02 10*3/MM3 (ref 0.7–3.1)
LYMPHOCYTES NFR BLD AUTO: 31 % (ref 19.6–45.3)
MCH RBC QN AUTO: 28 PG (ref 26.6–33)
MCHC RBC AUTO-ENTMCNC: 30.7 G/DL (ref 31.5–35.7)
MCV RBC AUTO: 91.2 FL (ref 79–97)
MONOCYTES # BLD AUTO: 0.69 10*3/MM3 (ref 0.1–0.9)
MONOCYTES NFR BLD AUTO: 7.1 % (ref 5–12)
NEUTROPHILS # BLD AUTO: 5.78 10*3/MM3 (ref 1.7–7)
NEUTROPHILS NFR BLD AUTO: 59.5 % (ref 42.7–76)
NITRITE UR QL STRIP: NEGATIVE
NRBC BLD AUTO-RTO: 0 /100 WBC (ref 0–0.2)
PH UR STRIP: 7 [PH] (ref 5–8)
PLATELET # BLD AUTO: 359 10*3/MM3 (ref 140–450)
POTASSIUM SERPL-SCNC: 4.7 MMOL/L (ref 3.5–5.2)
PROT SERPL-MCNC: 7.1 G/DL (ref 6–8.5)
PROT UR QL STRIP: NEGATIVE
RBC # BLD AUTO: 5.57 10*6/MM3 (ref 4.14–5.8)
SODIUM SERPL-SCNC: 138 MMOL/L (ref 136–145)
SP GR UR: NORMAL (ref 1–1.03)
TRIGL SERPL-MCNC: 97 MG/DL (ref 0–150)
TSH SERPL DL<=0.005 MIU/L-ACNC: 1.69 UIU/ML (ref 0.27–4.2)
UROBILINOGEN UR STRIP-MCNC: NORMAL MG/DL
VLDLC SERPL CALC-MCNC: 19.4 MG/DL (ref 5–40)
WBC # BLD AUTO: 9.73 10*3/MM3 (ref 3.4–10.8)

## 2019-09-10 ENCOUNTER — OFFICE VISIT (OUTPATIENT)
Dept: FAMILY MEDICINE CLINIC | Facility: CLINIC | Age: 44
End: 2019-09-10

## 2019-09-10 VITALS
BODY MASS INDEX: 37.24 KG/M2 | HEART RATE: 70 BPM | HEIGHT: 71 IN | DIASTOLIC BLOOD PRESSURE: 80 MMHG | WEIGHT: 266 LBS | OXYGEN SATURATION: 98 % | SYSTOLIC BLOOD PRESSURE: 110 MMHG

## 2019-09-10 DIAGNOSIS — S39.012A LUMBAR STRAIN, INITIAL ENCOUNTER: Primary | ICD-10-CM

## 2019-09-10 DIAGNOSIS — J30.9 ALLERGIC RHINITIS, UNSPECIFIED SEASONALITY, UNSPECIFIED TRIGGER: ICD-10-CM

## 2019-09-10 PROCEDURE — 99214 OFFICE O/P EST MOD 30 MIN: CPT | Performed by: NURSE PRACTITIONER

## 2019-09-10 NOTE — PATIENT INSTRUCTIONS
Discharge instructions    Allergy proof the house  HEPA filter  Weekly abnormal care to decrease allergens    Continue Flonase  Try over-the-counter nasal Crohn  OTC  2 sprays each nostril twice daily chronically for allergy prevention  Okay to continue antihistamine  Give this  6-week trial  If not improving consider generic Singulair as add on leukotriene inhibitor  Which is good for allergic rhinitis

## 2019-09-10 NOTE — PROGRESS NOTES
Subjective   Austin Orta is a 44 y.o. male.     Low back pain x1 week after bending over slightly over the garbage can holding a skillet  No radiating pains no weakness no pelvic pain no fever  Meloxicam helping some starting to get better just here to make sure he is on track  No chronic pain tends to have low back pain every couple years    Seasonal allergies dog allergies have a dog may get another one  Flonase helps some chronic nasal congestion cannot take decongestants          Seasonal allergies        Back Pain   Pertinent negatives include no chest pain or fever.        The following portions of the patient's history were reviewed and updated as appropriate: allergies, current medications, past family history, past medical history, past social history, past surgical history and problem list.    Review of Systems   Constitutional: Negative for fever.   Respiratory: Negative for shortness of breath.    Cardiovascular: Negative for chest pain.   Musculoskeletal: Positive for back pain.   All other systems reviewed and are negative.      Objective   Physical Exam   Constitutional: He is oriented to person, place, and time. He appears well-developed and well-nourished. No distress.   HENT:   Head: Normocephalic and atraumatic.   Nose: Nose normal.   Mouth/Throat: Oropharynx is clear and moist.   Eyes: Conjunctivae are normal. Pupils are equal, round, and reactive to light.   Neck: Neck supple. No JVD present.   Cardiovascular: Normal rate, regular rhythm and normal heart sounds.   No murmur heard.  Pulmonary/Chest: Effort normal and breath sounds normal. No respiratory distress. He has no wheezes.   Abdominal: Soft. Bowel sounds are normal. He exhibits no distension and no mass. There is no tenderness. There is no guarding. No hernia.   Musculoskeletal: He exhibits no edema or tenderness.   Negative straight leg raise plantar flexion dorsiflexion normal no weakness gait normal pain with position change on  table minimal grimacing   Lymphadenopathy:     He has no cervical adenopathy.   Neurological: He is alert and oriented to person, place, and time.   Skin: Skin is warm and dry. He is not diaphoretic.   Psychiatric: He has a normal mood and affect. His behavior is normal. Judgment and thought content normal.   Vitals reviewed.        Assessment/Plan   Austin was seen today for back pain.    Diagnoses and all orders for this visit:    Lumbar strain, initial encounter    Allergic rhinitis, unspecified seasonality, unspecified trigger            Follow instructions as below for low back  As well as allergies  See allergist if not improving or ENT            Patient Instructions   Discharge instructions    Allergy proof the house  HEPA filter  Weekly abnormal care to decrease allergens    Continue Flonase  Try over-the-counter nasal Crohn  OTC  2 sprays each nostril twice daily chronically for allergy prevention  Okay to continue antihistamine  Give this  6-week trial  If not improving consider generic Singulair as add on leukotriene inhibitor  Which is good for allergic rhinitis

## 2019-09-12 ENCOUNTER — OFFICE VISIT (OUTPATIENT)
Dept: SLEEP MEDICINE | Facility: HOSPITAL | Age: 44
End: 2019-09-12

## 2019-09-12 VITALS
OXYGEN SATURATION: 96 % | BODY MASS INDEX: 36.38 KG/M2 | HEART RATE: 67 BPM | SYSTOLIC BLOOD PRESSURE: 138 MMHG | WEIGHT: 268.6 LBS | DIASTOLIC BLOOD PRESSURE: 91 MMHG | HEIGHT: 72 IN

## 2019-09-12 DIAGNOSIS — R06.81 WITNESSED EPISODE OF APNEA: Primary | ICD-10-CM

## 2019-09-12 DIAGNOSIS — R40.0 DAYTIME SOMNOLENCE: ICD-10-CM

## 2019-09-12 DIAGNOSIS — R06.83 SNORING: ICD-10-CM

## 2019-09-12 PROCEDURE — G0463 HOSPITAL OUTPT CLINIC VISIT: HCPCS

## 2019-09-12 NOTE — PROGRESS NOTES
[facility]  Telephone: [phone] / Fax: [phone] [place]  Telephone: [phone] / Fax: [phone] [place]    Referring Physician: Epley, James, APRN  PCP: Epley, James, APRN    Reason for consult:  sleep apnea    Austin Orta is a 44 y.o.male  was seen in the [facility] today for evaluation of sleep apnea. He had seizure episode in December 2018 occurring at night. It was associated with afib. He had cardioversion in January 2019. In the past 5 years he gained 25 lbs. He reports waking up gasping/choking. His wife reports he snores on his back. Wife also reports apneas.  His sleep schedule is 10:30pm-6:45am. He wakes up feeling tired.    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    ROS-+sores in mouth, +anxiety, +depression, rest is negative.      Austin Orta  has a past medical history of Allergic, Anxiety, Anxiety and depression, Cavernous malformation, Depression, GERD (gastroesophageal reflux disease), History of prostatitis, Injury of right heel (07/27/2018), Insomnia, Joint pain, Kidney stones (2017), Left rotator cuff tear, Prostatitis, Right rotator cuff tear, and Venous angioma of brain (CMS/[facility]).    Current Medications:    Current Outpatient Medications:   •  acetaminophen (TYLENOL) 500 MG tablet, Take 500 mg by mouth Every 6 (Six) Hours As Needed for Mild Pain ., Disp: , Rfl:   •  aspirin (ASPIRIN 81) 81 MG chewable tablet, Chew 81 mg Daily., Disp: , Rfl:   •  bisoprolol (ZEBETA) 5 MG tablet, Take 1 tablet by mouth Daily., Disp: 30 tablet, Rfl: 6  •  escitalopram (LEXAPRO) 10 MG tablet, TAKE 1 TABLET BY MOUTH ONCE DAILY, Disp: 90 tablet, Rfl: 0  •  fluticasone (FLONASE SENSIMIST) 27.5 MCG/SPRAY nasal spray, 2 sprays into each nostril Daily., Disp: , Rfl:   •  levETIRAcetam (KEPPRA) 750 MG tablet, TAKE 1 TABLET BY MOUTH TWICE DAILY, Disp: 60 tablet, Rfl: 3  •  levocetirizine (XYZAL) 5 MG tablet, TAKE 1 TABLET BY MOUTH ONCE DAILY IN THE EVENING, Disp:  "90 tablet, Rfl: 0  •  meloxicam (MOBIC) 15 MG tablet, TAKE 1 TABLET BY MOUTH  DAILY, Disp: 90 tablet, Rfl: 0  •  omeprazole (priLOSEC) 20 MG capsule, Take 1 capsule by mouth Daily., Disp: 90 capsule, Rfl: 2    I have reviewed Past Medical History, Past Surgical History, Medication List, Social History and Family History as entered in Sleep Questionnaire and EPIC.    ESS  8   Vital Signs /91 (BP Location: Right arm, Patient Position: Sitting)   Pulse 67   Ht 182.9 cm (72\")   Wt 122 kg (268 lb 9.6 oz)   SpO2 96%   BMI 36.43 kg/m²  Body mass index is 36.43 kg/m².    General Alert and oriented. No acute distress noted   Pharynx/Throat Class IV Mallampati airway, large tongue, no evidence of redundant lateral pharyngeal tissue. No oral lesions. No thrush. Moist mucous membranes.   Head Normocephalic. Symmetrical. Atraumatic.    Nose No septal deviation. No drainage   Chest Wall Normal shape. Symmetric expansion with respiration. No tenderness.   Neck Trachea midline, no thyromegaly or adenopathy    Lungs Clear to auscultation bilaterally. No wheezes. No rhonchi. No rales. Respirations regular, even and unlabored.   Heart Regular rhythm and normal rate. Normal S1 and S2. No murmur   Abdomen Soft, non-tender and non-distended. Normal bowel sounds. No masses.   Extremities Moves all extremities well. No edema   Psychiatric Normal mood and affect.            Impression:  1. Witnessed episode of apnea    2. Daytime somnolence    3. Snoring          Plan:  Do sleep study with seizure montage. I discussed the pathophysiology of obstructive sleep apnea with the patient.  We discussed the adverse outcomes associated with untreated sleep-disordered breathing.  We discussed treatment modalities of obstructive sleep apnea including CPAP device as well as oral mandibular advancement device. Sleep study will be scheduled to establish definitive diagnosis of sleep disorder breathing.  Weight loss will be strongly beneficial " in order to reduce the severity of sleep-disordered breathing.  Patient has narrow oropharyngeal structure.  Caution during activities that require prolonged concentration is strongly advised.  Patient will be notified of sleep study results after sleep study is completed.  If sleep apnea is only mild,  oral mandibular advancement device may be one of the treatment options.  However if sleep apnea is moderately severe, CPAP treatment will be strongly encouraged.  The patient is not opposed to treatment with CPAP device if we confirm significant obstructive sleep apnea on polysomnography.  I did not prescribe Ambien for the study in view of history of seizures.    Thank you for allowing me to participate in your patient's care.    The patient will follow up with Dr. Vargas after completion of polysomnography.    RASHID Iqbal  [facility]  Phone: [phone]      Part of this note may be an electronic transcription/translation of spoken language to printed text using the Dragon Dictation System. Some errors may exist even though the document was edited.

## 2019-09-27 ENCOUNTER — OFFICE VISIT (OUTPATIENT)
Dept: NEUROLOGY | Facility: CLINIC | Age: 44
End: 2019-09-27

## 2019-09-27 VITALS
BODY MASS INDEX: 36.62 KG/M2 | HEART RATE: 72 BPM | DIASTOLIC BLOOD PRESSURE: 72 MMHG | WEIGHT: 270.4 LBS | SYSTOLIC BLOOD PRESSURE: 115 MMHG | HEIGHT: 72 IN | OXYGEN SATURATION: 96 %

## 2019-09-27 DIAGNOSIS — R56.9 SEIZURE (HCC): Primary | ICD-10-CM

## 2019-09-27 DIAGNOSIS — D18.02 VENOUS ANGIOMA OF BRAIN (HCC): ICD-10-CM

## 2019-09-27 PROCEDURE — 99214 OFFICE O/P EST MOD 30 MIN: CPT | Performed by: NURSE PRACTITIONER

## 2019-09-27 RX ORDER — LEVETIRACETAM 500 MG/1
500 TABLET ORAL 2 TIMES DAILY
Qty: 180 TABLET | Refills: 3 | Status: SHIPPED | OUTPATIENT
Start: 2019-09-27 | End: 2020-10-01

## 2019-09-27 NOTE — PROGRESS NOTES
DOS: 2019  NAME: Austin Orta   : 1975  PCP: Epley, James, APRN    Chief Complaint   Patient presents with   • Stroke      SUBJECTIVE  Neurological Problem:  44 y.o.  RHW male with seizures, cavernous angioma and venous anomaly and Afib s/p cardioversion who presents today for seizure follow-up.   He is unaccompanied.    Interval History:   Mr. Orta presented to [facility] on 19 with new onset seizures.  The patient reports that he was lying on the couch and his wife did also fall asleep watching a movie but the dog barking woke her up she witnessed generalized shaking and AMS for a couple of minutes.  Per EMS, patient was found to be in atrial fibrillation and had a second seizure while in route to the ED.  CT head showed a left frontal mass with follow-up MRI showed a very small cavernous malformation in the medial left subfrontal region.  He was evaluated by neurosurgery and had recent follow-up scans confirming left parasagittal frontal lobe cavernous malformation and small left frontal adjacent venous angioma. He was treated with Keppra and discharged on 500 mg BID. His keppra level was subtherapeutic at 8.6 and dose was later titrated to 750 mg BID.    He presents today, continues on Keppra 750 mg BID and denies any seizure-like activity since his last visit. Last Keppra level on  12.6. He does c/o some anger issues since being on the increased dose of Keppra, shouting at person in parking lot at [facility], quitting co-ed volleyball and just generally being more irritated.  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  He denies any other changes in personality or mood.  He denies headaches.  He did undergo cardioversion for his A. fib and is currently on ASA, no longer on Eliquis.  He also has arthritis in the right shoulder, currently on meloxicam for that.  We discussed at his previous visit the possibility of changing AEDs,  he is not a good candidate for Topamax as he has h/o kidney stones.  He denies smoking.  No alcohol use.    Review of Systems:Review of Systems   Constitutional: Positive for fatigue (more so than usual). Negative for activity change and appetite change.   HENT: Negative for ear pain, facial swelling and trouble swallowing.    Eyes: Negative for photophobia, pain and visual disturbance.   Respiratory: Negative for cough, chest tightness and shortness of breath.    Cardiovascular: Negative for chest pain, palpitations and leg swelling.   Gastrointestinal: Negative for abdominal pain, nausea and vomiting.   Endocrine: Positive for heat intolerance. Negative for cold intolerance and polydipsia.   Musculoskeletal: Negative for back pain (meloxicam helped), gait problem and neck pain.   Skin: Negative for color change, rash and wound.   Allergic/Immunologic: Positive for environmental allergies. Negative for food allergies and immunocompromised state.   Neurological: Negative for dizziness, tremors, seizures, syncope, facial asymmetry, speech difficulty, weakness, light-headedness, numbness and headaches.   Hematological: Negative for adenopathy. Does not bruise/bleed easily.   Psychiatric/Behavioral: Negative for agitation (increased when started keppra 750 but feels like it has leveled out), behavioral problems, confusion, decreased concentration, dysphoric mood, hallucinations, self-injury, sleep disturbance and suicidal ideas. The patient is not nervous/anxious and is not hyperactive.     Above ROS reviewed    Current Medications:   Current Outpatient Medications:   •  acetaminophen (TYLENOL) 500 MG tablet, Take 500 mg by mouth Every 6 (Six) Hours As Needed for Mild Pain ., Disp: , Rfl:   •  aspirin (ASPIRIN 81) 81 MG chewable tablet, Chew 81 mg Daily., Disp: , Rfl:   •  bisoprolol (ZEBETA) 5 MG tablet, Take 1 tablet by mouth Daily., Disp: 30 tablet, Rfl: 6  •  escitalopram (LEXAPRO) 10 MG tablet, TAKE 1 TABLET BY  MOUTH ONCE DAILY, Disp: 90 tablet, Rfl: 0  •  fluticasone (FLONASE SENSIMIST) 27.5 MCG/SPRAY nasal spray, 2 sprays into each nostril Daily., Disp: , Rfl:   •  levocetirizine (XYZAL) 5 MG tablet, TAKE 1 TABLET BY MOUTH ONCE DAILY IN THE EVENING, Disp: 90 tablet, Rfl: 0  •  meloxicam (MOBIC) 15 MG tablet, TAKE 1 TABLET BY MOUTH  DAILY, Disp: 90 tablet, Rfl: 0  •  omeprazole (priLOSEC) 20 MG capsule, Take 1 capsule by mouth Daily., Disp: 90 capsule, Rfl: 2  •  levETIRAcetam (KEPPRA) 500 MG tablet, Take 1 tablet by mouth 2 (Two) Times a Day., Disp: 180 tablet, Rfl: 3    The following portions of the patient's history were reviewed and updated as appropriate: allergies, current medications, past family history, past medical history, past social history, past surgical history and problem list.    OBJECTIVE  Vitals:    09/27/19 1505   BP: 115/72   Pulse: 72   SpO2: 96%       Diagnostics:    Laboratory Results:         Lab Results   Component Value Date    WBC 9.73 08/29/2019    HGB 15.6 08/29/2019    HCT 50.8 08/29/2019    MCV 91.2 08/29/2019     08/29/2019     Lab Results   Component Value Date    GLUCOSE 120 (H) 01/25/2019    BUN 18 08/29/2019    CREATININE 0.74 (L) 08/29/2019    EGFRIFNONA 114 08/29/2019    EGFRIFAFRI 139 08/29/2019    BCR 24.3 08/29/2019    K 4.7 08/29/2019    CO2 25.7 08/29/2019    CALCIUM 9.5 08/29/2019    PROTENTOTREF 7.1 08/29/2019    ALBUMIN 5.00 08/29/2019    LABIL2 2.4 08/29/2019    AST 17 08/29/2019    ALT 17 08/29/2019     No results found for: HGBA1C  No results found for: CHOL  Lab Results   Component Value Date    HDL 41 08/29/2019    HDL 39 (L) 07/26/2018    HDL 49 06/21/2016     Lab Results   Component Value Date    LDL 96 08/29/2019    LDL 93 07/26/2018     (H) 06/21/2016     Lab Results   Component Value Date    TRIG 97 08/29/2019    TRIG 159 (H) 07/26/2018    TRIG 139 06/21/2016     No results found for: RPR  Lab Results   Component Value Date    TSH 1.690 08/29/2019      No results found for: [id number]    Physical Examination:   General Appearance:   Well developed, overweight, well groomed, alert, and cooperative.  HEENT: Normocephalic.    Neck and Spine: Normal range of motion.  Normal alignment. No mass or tenderness. No bruits.  Cardiac: Regular rate and rhythm.   Peripheral Vasculature: Radial pulses are equal and symmetric. No signs of distal embolization.  Extremities:    No edema or deformities. Decreased ROM of right shoulder.  Skin:    No rashes or birth marks.  Psychiatric:    Euthymic. Normal affect.    Neurological:   MS: AO. Language normal. No neglect. Higher integrative function normal  CN: II-XII normal  Motor: Normal strength and tone throughout.  Sensory: Intact  Station and Gait: Normal gait and station.    Coordination: Normal    Impression: Mr. Garcia suffered new onset seizures in December 2018 along with new onset A. fib and findings of a left frontal cavernoma.  He has been maintained on Keppra 750 mg twice daily with no recurrent seizure activity although he does complain of increased irritability and frustration on current dose.  We discussed possibility of alternate AED therapy vs reducing dose of Keppra to 500 mg BID. He is not a good candidate for Topamax due to history of kidney stones. Will decrease Keppra to 500 mg in AM, 750 qhs until finishes remaining supply of 750s, then continue 500 mg BID. If continued mood issues, consider transitioning to alternate therapy. Plan to f/u in one year, sooner if symptoms warrant.  Patient voiced understanding and agrees with above plan.    Plan:     Continue 750 mg at night, 500 mg AM until out of 750s  Then continue 500 mg BID  Consider alternate therapy if continues with mood issues  F/U in one year, sooner if symptoms warrant.    I spent 25 minutes face to face with patient, with  > 50% spent counseling patient regarding diagnosis, review of diagnostics, personal risk factors, possible AED treatments,  purpose, risks and side-effects.     Austin was seen today for stroke.    Diagnoses and all orders for this visit:    Seizure (CMS/HCC)    Venous angioma of brain (CMS/HCC)    Other orders  -     levETIRAcetam (KEPPRA) 500 MG tablet; Take 1 tablet by mouth 2 (Two) Times a Day.        Coding      Dictated using Dragon

## 2019-10-03 ENCOUNTER — HOSPITAL ENCOUNTER (OUTPATIENT)
Dept: SLEEP MEDICINE | Facility: HOSPITAL | Age: 44
Discharge: HOME | End: 2019-10-03

## 2019-10-16 RX ORDER — MELOXICAM 15 MG/1
15 TABLET ORAL DAILY
Qty: 90 TABLET | Refills: 0 | Status: SHIPPED | OUTPATIENT
Start: 2019-10-16 | End: 2019-11-14 | Stop reason: SDUPTHER

## 2019-10-30 DIAGNOSIS — N63.0 BREAST NODULE: ICD-10-CM

## 2019-10-30 DIAGNOSIS — N64.4 MASTALGIA: Primary | ICD-10-CM

## 2019-11-12 ENCOUNTER — HOSPITAL ENCOUNTER (OUTPATIENT)
Dept: ULTRASOUND IMAGING | Facility: HOSPITAL | Age: 44
Discharge: HOME | End: 2019-11-12

## 2019-11-12 ENCOUNTER — HOSPITAL ENCOUNTER (OUTPATIENT)
Dept: MAMMOGRAPHY | Facility: HOSPITAL | Age: 44
Discharge: HOME | End: 2019-11-12
Admitting: NURSE PRACTITIONER

## 2019-11-12 DIAGNOSIS — N63.0 BREAST NODULE: ICD-10-CM

## 2019-11-12 DIAGNOSIS — N64.4 MASTALGIA: ICD-10-CM

## 2019-11-12 PROCEDURE — 77066 DX MAMMO INCL CAD BI: CPT

## 2019-11-12 PROCEDURE — 76642 ULTRASOUND BREAST LIMITED: CPT

## 2019-11-15 RX ORDER — MELOXICAM 15 MG/1
15 TABLET ORAL DAILY
Qty: 90 TABLET | Refills: 0 | Status: SHIPPED | OUTPATIENT
Start: 2019-11-15 | End: 2020-04-02

## 2019-11-26 DIAGNOSIS — F32.A DEPRESSION, UNSPECIFIED DEPRESSION TYPE: ICD-10-CM

## 2019-11-26 RX ORDER — ESCITALOPRAM OXALATE 10 MG/1
TABLET ORAL
Qty: 90 TABLET | Refills: 1 | Status: SHIPPED | OUTPATIENT
Start: 2019-11-26 | End: 2020-05-27

## 2019-12-23 RX ORDER — LEVOCETIRIZINE DIHYDROCHLORIDE 5 MG/1
TABLET, FILM COATED ORAL
Qty: 90 TABLET | Refills: 0 | Status: SHIPPED | OUTPATIENT
Start: 2019-12-23 | End: 2020-03-16

## 2020-01-24 RX ORDER — OMEPRAZOLE 20 MG/1
CAPSULE, DELAYED RELEASE ORAL
Qty: 90 CAPSULE | Refills: 1 | Status: SHIPPED | OUTPATIENT
Start: 2020-01-24 | End: 2020-06-17

## 2020-03-03 ENCOUNTER — OFFICE VISIT (OUTPATIENT)
Dept: FAMILY MEDICINE CLINIC | Facility: CLINIC | Age: 45
End: 2020-03-03

## 2020-03-03 VITALS
OXYGEN SATURATION: 95 % | HEART RATE: 74 BPM | SYSTOLIC BLOOD PRESSURE: 112 MMHG | HEIGHT: 72 IN | BODY MASS INDEX: 36.16 KG/M2 | TEMPERATURE: 97.9 F | DIASTOLIC BLOOD PRESSURE: 74 MMHG | WEIGHT: 267 LBS

## 2020-03-03 DIAGNOSIS — J20.5 ACUTE BRONCHITIS DUE TO RESPIRATORY SYNCYTIAL VIRUS (RSV): ICD-10-CM

## 2020-03-03 DIAGNOSIS — N62 GYNECOMASTIA, MALE: ICD-10-CM

## 2020-03-03 DIAGNOSIS — B34.9 ACUTE VIRAL SYNDROME: Primary | ICD-10-CM

## 2020-03-03 DIAGNOSIS — Z12.11 COLON CANCER SCREENING: ICD-10-CM

## 2020-03-03 PROCEDURE — 99213 OFFICE O/P EST LOW 20 MIN: CPT | Performed by: NURSE PRACTITIONER

## 2020-03-03 NOTE — PATIENT INSTRUCTIONS
You likely have influenza or influenza-like but now your symptoms were consistent with bronchitis and post viral symptoms  Push fluids plenty rest  Guaifenesin or Mucinex plain okay to take with Delsym  Follow-up if not improving over the next 1 to 2 weeks  Any prolonged cough recheck  If increasing shortness of breath chest pain high fever weakness emergency room

## 2020-03-03 NOTE — PROGRESS NOTES
"Subjective   Austin Orta is a 45 y.o. male.     Cough and lisa 10 days   Started sore throat fever then cough  Felt like flu  No recent travel  Wife same symptoms week before    Patient is here for follow-up anxiety, he takes SSRI 10 mg Lexapro generic is doing well wants to continue no problems  Patient has some gynecomastia right side        Depression Patient is not experiencing: confusion, palpitations and shortness of breath.    Cough   Associated symptoms include a sore throat. Pertinent negatives include no chest pain, chills, fever or shortness of breath.   Sore Throat    Associated symptoms include coughing. Pertinent negatives include no abdominal pain, shortness of breath or trouble swallowing.        /74   Pulse 74   Temp 97.9 °F (36.6 °C) (Oral)   Ht 182.9 cm (72\")   Wt 121 kg (267 lb)   SpO2 95%   BMI 36.21 kg/m²       The following portions of the patient's history were reviewed and updated as appropriate: allergies, current medications, past family history, past medical history, past social history, past surgical history and problem list.    Review of Systems   Constitutional: Negative for chills, diaphoresis, fatigue and fever.   HENT: Positive for sore throat. Negative for trouble swallowing.    Eyes: Negative.    Respiratory: Positive for cough. Negative for shortness of breath.    Cardiovascular: Negative for chest pain, palpitations and leg swelling.   Gastrointestinal: Negative.  Negative for abdominal pain.   Genitourinary: Negative.    Musculoskeletal: Negative.    Skin: Negative.    Neurological: Negative.  Negative for dizziness and confusion.   Psychiatric/Behavioral: Negative.    All other systems reviewed and are negative.      Objective   Physical Exam   Constitutional: He is oriented to person, place, and time. He appears well-developed and well-nourished. No distress.   HENT:   Head: Normocephalic and atraumatic.   Nose: Nose normal.   Mouth/Throat: Oropharynx is clear " and moist.   Eyes: Pupils are equal, round, and reactive to light. Conjunctivae are normal.   Neck: Neck supple. No JVD present.   Cardiovascular: Normal rate, regular rhythm and normal heart sounds.   No murmur heard.  Pulmonary/Chest: Effort normal and breath sounds normal. No respiratory distress. He has no wheezes.   Musculoskeletal: He exhibits no edema or tenderness.   Lymphadenopathy:     He has no cervical adenopathy.   Neurological: He is alert and oriented to person, place, and time.   Skin: Skin is warm and dry. He is not diaphoretic.   Psychiatric: He has a normal mood and affect. His behavior is normal. Judgment and thought content normal.   Vitals reviewed.  2      Assessment/Plan   Austin was seen today for depression, cough and sore throat.    Diagnoses and all orders for this visit:    Acute viral syndrome    Acute bronchitis due to respiratory syncytial virus (RSV)    Gynecomastia, male  Comments:  Right-sided mammogram ultrasound 2019 normal otherwise we will check prolactin  Orders:  -     Prolactin    Colon cancer screening  -     Ambulatory Referral For Screening Colonoscopy    Viral upper respiratory symptoms no recent travel  Symptomatic treatment  Discussed red flags of worsening symptoms high fever chest pain severe malaise weakness immediate treatment evaluation  Ibuprofen 800 as needed for fever aches and pains OTC up to 3 times a day food and water  Check prolactin should be normal  Focus on weight loss[  Consider seeing endocrinology  Patient is not interested in any surgical treatment  Presently is having no breast complaints of pain or nodules    Colon cancer screening        There are no Patient Instructions on file for this visit.

## 2020-03-04 LAB — PROLACTIN SERPL-MCNC: 10.7 NG/ML (ref 4–15.2)

## 2020-03-12 ENCOUNTER — PREP FOR SURGERY (OUTPATIENT)
Dept: OTHER | Facility: HOSPITAL | Age: 45
End: 2020-03-12

## 2020-03-12 ENCOUNTER — OFFICE VISIT (OUTPATIENT)
Dept: GASTROENTEROLOGY | Facility: CLINIC | Age: 45
End: 2020-03-12

## 2020-03-12 VITALS
BODY MASS INDEX: 36.3 KG/M2 | HEIGHT: 72 IN | TEMPERATURE: 98.1 F | SYSTOLIC BLOOD PRESSURE: 114 MMHG | DIASTOLIC BLOOD PRESSURE: 78 MMHG | OXYGEN SATURATION: 95 % | HEART RATE: 76 BPM | WEIGHT: 268 LBS

## 2020-03-12 DIAGNOSIS — R19.7 DIARRHEA, UNSPECIFIED TYPE: Primary | ICD-10-CM

## 2020-03-12 DIAGNOSIS — Z12.11 ENCOUNTER FOR SCREENING FOR MALIGNANT NEOPLASM OF COLON: ICD-10-CM

## 2020-03-12 DIAGNOSIS — K21.9 GASTROESOPHAGEAL REFLUX DISEASE, ESOPHAGITIS PRESENCE NOT SPECIFIED: ICD-10-CM

## 2020-03-12 PROCEDURE — 99204 OFFICE O/P NEW MOD 45 MIN: CPT | Performed by: INTERNAL MEDICINE

## 2020-03-12 RX ORDER — DICYCLOMINE HYDROCHLORIDE 10 MG/1
10 CAPSULE ORAL 3 TIMES DAILY PRN
Qty: 90 CAPSULE | Refills: 5 | Status: SHIPPED | OUTPATIENT
Start: 2020-03-12 | End: 2020-09-25

## 2020-03-12 NOTE — PATIENT INSTRUCTIONS
Schedule EGD for evaluation of chronic GERD.    Schedule colonoscopy for colon cancer screening and for further evaluation of chronic diarrhea.    For diarrhea and urgency, begin trial of dicyclomine as prescribed. Take this around 30-60 minutes before meals to help with urgency after eating.    Follow-up after testing complete.  Call for any new or worsening symptoms.

## 2020-03-12 NOTE — PROGRESS NOTES
Colonoscopy and Diarrhea (loose stool )      HPI  Patient is a 45 year old male who presents today for evaluation.    Patient denies any family history of colon cancer.    Patient reports he has had loose stools and diarrhea since his cholecystectomy. This occurred in 2015. He reports fecal urgency after eating. He reports symptoms are generally worse after he eats lunch. At times, he avoids eating to help manage this symptom. He takes imodium 2 tablets daily in the morning to try to manage the diarrhea however he is uncertain this has helped.    He denies abdominal bloating. He denies abdominal pain or cramping.    He has tried cholestyramine powder, however reports this was not well tolerated. He has previously been on dicyclomine which helped his symptoms.    He reports a history of GERD. Reports symptoms are severe. He has had symptoms for over 20 years. He takes omeprazole for this. He reports he was diagnosed with a hiatal hernia around 20 years ago. He denies dysphagia.      Review of Systems   Constitutional: Positive for fatigue. Negative for appetite change, chills, diaphoresis, fever and unexpected weight change.   HENT: Positive for rhinorrhea, sore throat and voice change. Negative for dental problem and mouth sores.    Eyes: Negative for pain, redness and visual disturbance.   Respiratory: Positive for cough. Negative for chest tightness and wheezing.    Cardiovascular: Negative for chest pain, palpitations and leg swelling.   Endocrine: Negative for cold intolerance, heat intolerance, polydipsia, polyphagia and polyuria.   Genitourinary: Negative for dysuria, frequency, hematuria and urgency.   Musculoskeletal: Negative for arthralgias, back pain, joint swelling, myalgias and neck pain.   Skin: Negative for rash.   Allergic/Immunologic: Positive for environmental allergies. Negative for food allergies and immunocompromised state.   Neurological: Negative for dizziness, seizures, weakness, numbness and  headaches.   Hematological: Does not bruise/bleed easily.   Psychiatric/Behavioral: Negative for sleep disturbance. The patient is not nervous/anxious.         I have reviewed and confirmed the accuracy of the ROS as documented by the MA/LPN/RN Macario Ulrich MD     Problem List:    Patient Active Problem List   Diagnosis   • Atopic rhinitis   • Biliary dyskinesia   • Diarrhea   • Erectile dysfunction of nonorganic origin   • Gastroesophageal reflux disease   • Insomnia   • Prostatitis   • Chronic fatigue   • Seasonal allergies   • Right shoulder pain   • Hyperlipidemia   • Non morbid obesity due to excess calories   • Eustachian tube dysfunction   • Rotator cuff arthropathy   • Viral syndrome   • Pain of both hip joints   • Abdominal pain   • Acute bilateral low back pain without sciatica   • Strain of Achilles tendon, initial encounter   • Depression   • Seizure (CMS/HCC)   • Atrial fibrillation (CMS/HCC)   • Cavernous malformation   • Anticoagulated   • Venous angioma of brain (CMS/HCC)   • Paroxysmal atrial fibrillation (CMS/HCC)       Medical History:    Past Medical History:   Diagnosis Date   • Allergic     seasonal   • Anxiety    • Anxiety and depression    • Cavernous malformation    • Depression    • GERD (gastroesophageal reflux disease)    • History of prostatitis    • Injury of right heel 07/27/2018   • Insomnia    • Joint pain    • Kidney stones 2017 1997, 2010 also   • Left rotator cuff tear    • Prostatitis    • Right rotator cuff tear    • Venous angioma of brain (CMS/HCC)         Social History:    Social History     Socioeconomic History   • Marital status:      Spouse name: Not on file   • Number of children: Not on file   • Years of education: Not on file   • Highest education level: Not on file   Tobacco Use   • Smoking status: Never Smoker   • Smokeless tobacco: Never Used   Substance and Sexual Activity   • Alcohol use: No   • Drug use: No   • Sexual activity: Defer        Family History:   Family History   Problem Relation Age of Onset   • Cancer Mother    • Hypertension Mother    • Nephrolithiasis Sister    • Heart attack Maternal Grandmother    • Cancer Maternal Grandmother    • Skin cancer Father    • Heart attack Maternal Grandfather    • Heart disease Maternal Grandfather    • Dementia Paternal Grandmother    • Alzheimer's disease Paternal Grandmother    • Skin cancer Paternal Grandfather        Surgical History:   Past Surgical History:   Procedure Laterality Date   • CHOLECYSTECTOMY     • EXTRACORPOREAL SHOCK WAVE LITHOTRIPSY (ESWL) Right 3/24/2017    Procedure: RIGHT EXTRACORPOREAL SHOCKWAVE LITHOTRIPSY WITH CYSTOSCOPY;  Surgeon: Maksim Walker MD;  Location: [facility] OR [facility] [place];  Service:    • INGUINAL HERNIA REPAIR     • KIDNEY STONE SURGERY     • TONSILLECTOMY     • VASECTOMY           Current Outpatient Medications:   •  acetaminophen (TYLENOL) 500 MG tablet, Take 500 mg by mouth Every 6 (Six) Hours As Needed for Mild Pain ., Disp: , Rfl:   •  aspirin (ASPIRIN 81) 81 MG chewable tablet, Chew 81 mg Daily., Disp: , Rfl:   •  bisoprolol (ZEBETA) 5 MG tablet, Take 1 tablet by mouth Daily., Disp: 30 tablet, Rfl: 6  •  escitalopram (LEXAPRO) 10 MG tablet, TAKE 1 TABLET BY MOUTH ONCE DAILY, Disp: 90 tablet, Rfl: 1  •  fluticasone (FLONASE SENSIMIST) 27.5 MCG/SPRAY nasal spray, 2 sprays into each nostril Daily., Disp: , Rfl:   •  levETIRAcetam (KEPPRA) 500 MG tablet, Take 1 tablet by mouth 2 (Two) Times a Day., Disp: 180 tablet, Rfl: 3  •  levocetirizine (XYZAL) 5 MG tablet, TAKE 1 TABLET BY MOUTH ONCE DAILY IN THE EVENING, Disp: 90 tablet, Rfl: 0  •  meloxicam (MOBIC) 15 MG tablet, TAKE 1 TABLET BY MOUTH  DAILY, Disp: 90 tablet, Rfl: 0  •  omeprazole (priLOSEC) 20 MG capsule, TAKE 1 CAPSULE BY MOUTH ONCE DAILY, Disp: 90 capsule, Rfl: 1    Allergies:   Allergies   Allergen Reactions   • Ciprofloxacin Nausea And Vomiting and Myalgia        The following portions of the  patient's history were reviewed and updated as appropriate: allergies, current medications, past family history, past medical history, past social history, past surgical history and problem list.    Vitals:    03/12/20 1340   BP: 114/78   Pulse: 76   Temp: 98.1 °F (36.7 °C)   SpO2: 95%         03/12/20  1340   Weight: 122 kg (268 lb)     Body mass index is 36.34 kg/m².      PHYSICAL EXAM:  Physical Exam   Constitutional: He appears well-developed.   HENT:   Nose: Nose normal. No nasal deformity.   Eyes: No scleral icterus.   Neck: No tracheal deviation present.   Pulmonary/Chest: Effort normal and breath sounds normal. No respiratory distress.   Abdominal: Soft. Normal appearance and bowel sounds are normal. He exhibits no shifting dullness and no distension. There is no hepatosplenomegaly. There is no tenderness. There is no rigidity, no rebound and no guarding. No hernia.   Lymphadenopathy:   No periumbilical lymphadenopathy   Neurological: He is alert.   Skin: Skin is warm. No cyanosis.   Psychiatric: He has a normal mood and affect. His behavior is normal.   Vitals reviewed.          Assessment/ Plan  Austin was seen today for colonoscopy and diarrhea.    Diagnoses and all orders for this visit:    Diarrhea, unspecified type    Gastroesophageal reflux disease, esophagitis presence not specified    Encounter for screening for malignant neoplasm of colon    Schedule EGD for evaluation of chronic GERD.    Schedule colonoscopy for colon cancer screening and for further evaluation of chronic diarrhea.    For diarrhea and urgency, begin trial of dicyclomine as prescribed.    Return for Review results after testing complete.      Discussion:  We will proceed with an EGD due to his long history of reflux disease to rule out Causey's esophagus.  We will also proceed with a colonoscopy for screening but also to biopsy his colon to rule out any type of microscopic colitis due to his persistent loose stools and urgency.   This most likely represents IBS D.  We will go ahead and empirically start him on Bentyl.  If no relief and biopsies are negative, would try Xifaxan.    Note: Review and summarization of old patient records in this note have been personally reviewed by me  And   Refer to After Visit Summary for details of patient counseling, education and instructions provided during the encounter    Documentation by Roya MIKE acting as a scribe in the following sections on behalf of the billable provider: HPI, ROS, assessment, & plan.

## 2020-03-16 RX ORDER — LEVOCETIRIZINE DIHYDROCHLORIDE 5 MG/1
TABLET, FILM COATED ORAL
Qty: 90 TABLET | Refills: 0 | Status: SHIPPED | OUTPATIENT
Start: 2020-03-16 | End: 2020-06-29

## 2020-04-02 RX ORDER — MELOXICAM 15 MG/1
15 TABLET ORAL DAILY
Qty: 90 TABLET | Refills: 0 | Status: SHIPPED | OUTPATIENT
Start: 2020-04-02 | End: 2020-06-17

## 2020-04-06 RX ORDER — BISOPROLOL FUMARATE 5 MG/1
TABLET, FILM COATED ORAL
Qty: 30 TABLET | Refills: 0 | Status: SHIPPED | OUTPATIENT
Start: 2020-04-06 | End: 2020-04-29

## 2020-04-29 ENCOUNTER — TELEPHONE (OUTPATIENT)
Dept: FAMILY MEDICINE CLINIC | Facility: CLINIC | Age: 45
End: 2020-04-29

## 2020-04-29 RX ORDER — BISOPROLOL FUMARATE 5 MG/1
TABLET, FILM COATED ORAL
Qty: 30 TABLET | Refills: 0 | Status: SHIPPED | OUTPATIENT
Start: 2020-04-29 | End: 2020-06-01 | Stop reason: SDUPTHER

## 2020-04-29 NOTE — TELEPHONE ENCOUNTER
Patient called in today stating that he has a stye in his right eye,  Noticed it this morning, no itch or drainage, top lid is swollen and red    Would like something called in for him for this

## 2020-05-27 DIAGNOSIS — F32.A DEPRESSION, UNSPECIFIED DEPRESSION TYPE: ICD-10-CM

## 2020-05-27 RX ORDER — ESCITALOPRAM OXALATE 10 MG/1
TABLET ORAL
Qty: 90 TABLET | Refills: 0 | Status: SHIPPED | OUTPATIENT
Start: 2020-05-27 | End: 2020-08-25

## 2020-06-01 RX ORDER — BISOPROLOL FUMARATE 5 MG/1
5 TABLET, FILM COATED ORAL DAILY
Qty: 30 TABLET | Refills: 0 | Status: SHIPPED | OUTPATIENT
Start: 2020-06-01 | End: 2020-06-30

## 2020-06-05 ENCOUNTER — OUTSIDE FACILITY SERVICE (OUTPATIENT)
Dept: GASTROENTEROLOGY | Facility: CLINIC | Age: 45
End: 2020-06-05

## 2020-06-05 PROCEDURE — 43239 EGD BIOPSY SINGLE/MULTIPLE: CPT | Performed by: INTERNAL MEDICINE

## 2020-06-05 PROCEDURE — 45385 COLONOSCOPY W/LESION REMOVAL: CPT | Performed by: INTERNAL MEDICINE

## 2020-06-12 ENCOUNTER — OFFICE VISIT (OUTPATIENT)
Dept: CARDIOLOGY | Facility: CLINIC | Age: 45
End: 2020-06-12

## 2020-06-12 VITALS
HEART RATE: 72 BPM | BODY MASS INDEX: 35.21 KG/M2 | WEIGHT: 260 LBS | SYSTOLIC BLOOD PRESSURE: 129 MMHG | DIASTOLIC BLOOD PRESSURE: 85 MMHG | HEIGHT: 72 IN

## 2020-06-12 DIAGNOSIS — E78.5 HYPERLIPIDEMIA, UNSPECIFIED HYPERLIPIDEMIA TYPE: Primary | ICD-10-CM

## 2020-06-12 DIAGNOSIS — I48.0 PAROXYSMAL ATRIAL FIBRILLATION (HCC): ICD-10-CM

## 2020-06-12 PROCEDURE — 99213 OFFICE O/P EST LOW 20 MIN: CPT | Performed by: NURSE PRACTITIONER

## 2020-06-12 PROCEDURE — 93000 ELECTROCARDIOGRAM COMPLETE: CPT | Performed by: NURSE PRACTITIONER

## 2020-06-12 NOTE — PROGRESS NOTES
Subjective:        Austin Orta is a 45 y.o. male who here for follow up    No chief complaint on file.    Is here today for one-year follow-up for paroxysmal atrial fibrillation.    HPI     Austin Orta is a 45-year-old male, who is new to this provider.  He has a history of proximal atrial fibrillation, hyperlipidemia, anxiety, depression, GERD, and obesity.  He had a Holter monitor in 2018 which was a normal monitor study.  Successful cardioversion in January 2019.  Stress test in 2018 which indicated a normal myocardial perfusion study with no evidence of ischemia.  His echo in 2018 revealed an EF of 60%, atrial food was the predominant rhythm observed during the procedure, LV systolic function was normal with a normal valvular structure and function.    He denies chest pain, palpitations, shortness of breath, syncope and near syncope.    The following portions of the patient's history were reviewed and updated as appropriate: allergies, current medications, past family history, past medical history, past social history, past surgical history and problem list.    Past Medical History:   Diagnosis Date   • Allergic     seasonal   • Anxiety    • Anxiety and depression    • Cavernous malformation    • Depression    • GERD (gastroesophageal reflux disease)    • History of prostatitis    • Injury of right heel 07/27/2018   • Insomnia    • Joint pain    • Kidney stones 2017 1997, 2010 also   • Left rotator cuff tear    • Prostatitis    • Right rotator cuff tear    • Venous angioma of brain (CMS/HCC)          reports that he has never smoked. He has never used smokeless tobacco. He reports that he does not drink alcohol or use drugs.     Family History   Problem Relation Age of Onset   • Cancer Mother    • Hypertension Mother    • Nephrolithiasis Sister    • Heart attack Maternal Grandmother    • Cancer Maternal Grandmother    • Skin cancer Father    • Heart attack Maternal Grandfather    • Heart disease Maternal  Grandfather    • Dementia Paternal Grandmother    • Alzheimer's disease Paternal Grandmother    • Skin cancer Paternal Grandfather        ROS     Review of Systems  Constitutional: No wt loss, fever, fatigue  Gastrointestinal: No nausea, abdominal pain  Behavioral/Psych: No insomnia or anxiety  Cardiovascular: Denies chest pain, palpitations, shortness of breath, syncope and near syncope.      Objective:           Physical Exam   Constitutional: He is oriented to person, place, and time. He appears well-developed and well-nourished.   HENT:   Head: Normocephalic.   Right Ear: External ear normal.   Left Ear: External ear normal.   Eyes: EOM are normal.   Neck: Normal range of motion. No JVD present.   Cardiovascular: Normal rate, regular rhythm, normal heart sounds and intact distal pulses. Exam reveals no gallop and no friction rub.   No murmur heard.  Pulmonary/Chest: Effort normal and breath sounds normal. No stridor. No respiratory distress. He has no rales.   Abdominal: Soft. Bowel sounds are normal. He exhibits no distension. There is no tenderness. There is no guarding.   Musculoskeletal: Normal range of motion. He exhibits no edema or tenderness.   Neurological: He is alert and oriented to person, place, and time. He has normal reflexes.   Skin: Skin is warm.   Psychiatric: He has a normal mood and affect. Judgment normal.   Nursing note and vitals reviewed.          ECG 12 Lead  Date/Time: 6/12/2020 9:02 AM  Performed by: Cordelia Lugo APRN  Authorized by: Cordelia Lugo APRN   Comparison: compared with previous ECG from 2/15/2019  Similar to previous ECG  Rhythm: sinus rhythm             4/2019  Interpretation Summary     · A normal monitor study.     Interpretation Summary     · Post cardioversion the patient displayed a sinus rhythm.  · The cardioversion was successful.     2018  Interpretation Summary     · Equivocal ECG evidence of myocardial ischemia.Negative clinical evidence of  myocardial ischemia.  · Myocardial perfusion imaging indicates a normal myocardial perfusion study with no evidence of ischemia. Impressions are consistent with a low risk study.     Interpretation Summary     · Atrial fibrillation was the predominant rhythm observed during the procedure.  · Calculated EF = 60%.  · Left ventricular systolic function is normal.  · Normal valvular structure and function         Current Outpatient Medications:   •  acetaminophen (TYLENOL) 500 MG tablet, Take 500 mg by mouth Every 6 (Six) Hours As Needed for Mild Pain ., Disp: , Rfl:   •  aspirin (ASPIRIN 81) 81 MG chewable tablet, Chew 81 mg Daily., Disp: , Rfl:   •  bisoprolol (ZEBeta) 5 MG tablet, Take 1 tablet by mouth Daily., Disp: 30 tablet, Rfl: 0  •  dicyclomine (BENTYL) 10 MG capsule, Take 1 capsule by mouth 3 (Three) Times a Day As Needed (abdominal pain/diarrhea)., Disp: 90 capsule, Rfl: 5  •  escitalopram (LEXAPRO) 10 MG tablet, Take 1 tablet by mouth once daily, Disp: 90 tablet, Rfl: 0  •  fluticasone (FLONASE SENSIMIST) 27.5 MCG/SPRAY nasal spray, 2 sprays into each nostril Daily., Disp: , Rfl:   •  levETIRAcetam (KEPPRA) 500 MG tablet, Take 1 tablet by mouth 2 (Two) Times a Day., Disp: 180 tablet, Rfl: 3  •  levocetirizine (XYZAL) 5 MG tablet, TAKE 1 TABLET BY MOUTH ONCE DAILY IN THE EVENING, Disp: 90 tablet, Rfl: 0  •  meloxicam (MOBIC) 15 MG tablet, TAKE 1 TABLET BY MOUTH  DAILY, Disp: 90 tablet, Rfl: 0  •  omeprazole (priLOSEC) 20 MG capsule, TAKE 1 CAPSULE BY MOUTH ONCE DAILY, Disp: 90 capsule, Rfl: 1     Assessment:        Patient Active Problem List   Diagnosis   • Atopic rhinitis   • Biliary dyskinesia   • Diarrhea   • Erectile dysfunction of nonorganic origin   • Gastroesophageal reflux disease   • Insomnia   • Prostatitis   • Chronic fatigue   • Seasonal allergies   • Right shoulder pain   • Hyperlipidemia   • Non morbid obesity due to excess calories   • Eustachian tube dysfunction   • Rotator cuff arthropathy    • Viral syndrome   • Pain of both hip joints   • Abdominal pain   • Acute bilateral low back pain without sciatica   • Strain of Achilles tendon, initial encounter   • Depression   • Seizure (CMS/HCC)   • Atrial fibrillation (CMS/HCC)   • Cavernous malformation   • Anticoagulated   • Venous angioma of brain (CMS/HCC)   • Paroxysmal atrial fibrillation (CMS/HCC)               Plan:   1.  Paroxysmal atrial fibrillation: 2019 he was cardioverted successfully.  He denies palpitations at this time.  He continues to be on Eliquis post ablation with his office visit with Dr. Yahaira Murphy was DC'd at that time.  He was also supposed to have a Linq placed to help determine if he has any more atrial fibrillation, but it looks like he had not followed up for any more appointments. He has decided be cause of cost he does not want to have the Linq placed. Long discussion about anticoagulation and understands the risk and benefits of not being anticoagulated.      2.  Hyperlipidemia: Last lipid profile in 2019 revealed , HDL 41, LDL 96, and triglycerides 97.  Control noted. He states his PCP manages his cholesterol.    Risk of the hyperlipidemia, importance of the treatment has been explained. Pros and cons of the statins has been explained. Regular blood workup as well as side effects including the liver failure, myelopathy death has been explained.     No diagnosis found.    There are no diagnoses linked to this encounter.    COUNSELING:    Austin Griffiths was given to patient for the following topics: diagnostic results, risk factor reductions, impressions, risks and benefits of treatment options and importance of treatment compliance .       SMOKING COUNSELING:    He will have an appointment in 3 months to talk about getting a Linq placed.    Sincerely,   RASHID Lindsey  [facility]  06/12/20  0900    EMR Dragon/Transcription disclaimer:   Much of this encounter note is an  electronic transcription/translation of spoken language to printed text. The electronic translation of spoken language may permit erroneous, or at times, nonsensical words or phrases to be inadvertently transcribed; Although I have reviewed the note for such errors, some may still exist.

## 2020-06-17 ENCOUNTER — OFFICE VISIT (OUTPATIENT)
Dept: FAMILY MEDICINE CLINIC | Facility: CLINIC | Age: 45
End: 2020-06-17

## 2020-06-17 VITALS
SYSTOLIC BLOOD PRESSURE: 104 MMHG | HEART RATE: 69 BPM | BODY MASS INDEX: 35.78 KG/M2 | DIASTOLIC BLOOD PRESSURE: 70 MMHG | WEIGHT: 263.9 LBS | TEMPERATURE: 98 F

## 2020-06-17 DIAGNOSIS — K21.00 GASTROESOPHAGEAL REFLUX DISEASE WITH ESOPHAGITIS: ICD-10-CM

## 2020-06-17 DIAGNOSIS — L72.0 CYST OF SKIN AND SUBCUTANEOUS TISSUE: Primary | ICD-10-CM

## 2020-06-17 PROCEDURE — 99213 OFFICE O/P EST LOW 20 MIN: CPT | Performed by: NURSE PRACTITIONER

## 2020-06-17 RX ORDER — CELECOXIB 200 MG/1
200 CAPSULE ORAL DAILY
Qty: 90 CAPSULE | Refills: 1 | Status: SHIPPED | OUTPATIENT
Start: 2020-06-17 | End: 2020-09-03

## 2020-06-17 RX ORDER — SILDENAFIL 100 MG/1
100 TABLET, FILM COATED ORAL DAILY PRN
Qty: 30 TABLET | Refills: 5 | Status: SHIPPED | OUTPATIENT
Start: 2020-06-17 | End: 2022-07-28

## 2020-06-17 RX ORDER — OMEPRAZOLE 40 MG/1
40 CAPSULE, DELAYED RELEASE ORAL DAILY
Qty: 90 CAPSULE | Refills: 3 | Status: SHIPPED | OUTPATIENT
Start: 2020-06-17 | End: 2021-04-30

## 2020-06-17 NOTE — PATIENT INSTRUCTIONS
Discharge instructions  If the cyst does not resolve over the next 4 months or so see orthopedic  Call for referral  Dr. Pacheco    Increased pain redness swelling  Call for referral sooner as well as follow-up    Discontinue meloxicam  Generic Celebrex lowest effective dose shortest time possible push fluids take with water  Skip a day when you can  Liver kidney function every 6 months monitor blood pressure    Increase omeprazole up to 40 mg daily  As discussed    1800 audra a day mostly chicken fish smaller more frequent meals bland to better control your reflux use caloric restriction  For improved weight loss over the next year  This will lower your diaphragm move your reflux  Improve your pain all the life

## 2020-06-17 NOTE — PROGRESS NOTES
Subjective   Austin Orta is a 45 y.o. male.     Pleasant patient complains of a knot on his right knee several weeks  No pain no tenderness no fever no chills no difficulty with his gait  His mother was concerned about it so he thought he would come in to discuss  Nothing makes better or worse    History of GERD with esophagitis just had a colonoscopy fairly recently  He would like me to write a new prescription for omeprazole from 20 mg to 40  Or change Celexa line he still has some periods of breakthrough reflux    He has chronic arthritic pains  Right shoulder pain takes meloxicam chronically  We discussed risk and benefit of all NSAIDs potential risk of gastric bleeding esophagitis esophageal erosions  Kidney failure stroke heart attack   Strategy to mitigate risk as well as conservative alternatives and lowest effective dose         /70   Pulse 69   Temp 98 °F (36.7 °C)   Wt 120 kg (263 lb 14.4 oz)   BMI 35.78 kg/m²       The following portions of the patient's history were reviewed and updated as appropriate: allergies, current medications, past family history, past medical history, past social history, past surgical history and problem list.    Review of Systems   Constitutional: Negative for fatigue and fever.   HENT: Negative.  Negative for trouble swallowing.    Eyes: Negative.    Respiratory: Negative.  Negative for cough and shortness of breath.    Cardiovascular: Negative for chest pain, palpitations and leg swelling.   Gastrointestinal: Negative.  Negative for abdominal pain.   Genitourinary: Negative.    Musculoskeletal: Negative.         Not knee   Skin: Negative.    Neurological: Negative.  Negative for dizziness and confusion.   Psychiatric/Behavioral: Negative.        Objective   Physical Exam   Constitutional: He appears well-developed and well-nourished.   HENT:   Head: Normocephalic and atraumatic.   Eyes: Pupils are equal, round, and reactive to light. Conjunctivae are normal.    Neck: Neck supple.   Pulmonary/Chest: Effort normal.   Musculoskeletal:   1/2 to 1 cm joint line mid right knee compressible  Likely overlying bony articulation is not mobile nor is it firm not tender without redness  Skin is clear     Skin: Skin is warm and dry. No rash noted. No erythema.   Psychiatric: He has a normal mood and affect. His behavior is normal. Judgment and thought content normal.   Vitals reviewed.        Assessment/Plan   Austin was seen today for mass.    Diagnoses and all orders for this visit:    Cyst of skin and subcutaneous tissue  Comments:  Right lower extremity lateral aspect of the knee joint line external    Gastroesophageal reflux disease with esophagitis    Other orders  -     omeprazole (priLOSEC) 40 MG capsule; Take 1 capsule by mouth Daily.  -     celecoxib (CeleBREX) 200 MG capsule; Take 1 capsule by mouth Daily. With food and water as needed lowest effective dose  -     sildenafil (Viagra) 100 MG tablet; Take 1 tablet by mouth Daily As Needed for Erectile Dysfunction.      Celebrex similar risk to meloxicam  Likely easier on the stomach              Patient Instructions   Discharge instructions  If the cyst does not resolve over the next 4 months or so see orthopedic  Call for referral  Dr. Pacheco    Increased pain redness swelling  Call for referral sooner as well as follow-up    Discontinue meloxicam  Generic Celebrex lowest effective dose shortest time possible push fluids take with water  Skip a day when you can  Liver kidney function every 6 months monitor blood pressure    Increase omeprazole up to 40 mg daily  As discussed    1800 audra a day mostly chicken fish smaller more frequent meals bland to better control your reflux use caloric restriction  For improved weight loss over the next year  This will lower your diaphragm move your reflux  Improve your pain all the life

## 2020-06-25 NOTE — PROGRESS NOTES
Follow-up      HPI  45-year-old male presents the office today for follow-up after his EGD and colonoscopy, which were performed on 6/5/2020.  Findings are summarized below.  He has a history of GERD, hiatal hernia cholecystectomy, and diarrhea.    EGD on 6/5/2020 revealed LA grade a esophagitis, fundic gland polyps, and gastritis.  Small bowel biopsy was unremarkable.  Stomach antrum biopsy revealed reactive gastropathy.  Stomach polyp biopsies demonstrated benign fundic gland polyps.  The patient reports that he had been taking omeprazole 20 mg daily for several years.  He had noticed worsening symptoms over time including epigastric burning at night and had also experienced 4 episodes of significant reflux that caused him to choke in the middle of the night.  Since his omeprazole was increased to 40 mg once daily, he denies having any further symptoms.  He denies any dysphasia.    Colonoscopy performed on 6/5/2020 revealed moderate grade 1 nonbleeding internal hemorrhoids and one 5 mm polyp in the descending colon.  Random right and left colon biopsies were unremarkable.  Colon biopsy revealed a mucosal neuroma.  This was likely an incidental finding and rarely may be a manifestation of certain syndromes such as neurofibromatosis,MEN IIb, Cowden's disease, or tuberous sclerosis.  He was recommended to follow-up with his PCP for this finding.  He stated he followed up with PCP last week, but there was no mention of his colonoscopy pathology report.    He has a history of diarrhea, which began shortly after his cholecystectomy in 2015.  He had previously been prescribed cholestyramine powder, but states he was not compliant due to the hassle of mixing the medication.  He has tried dicyclomine which has helped somewhat.  His daily regimen consists of 2 Imodium each morning.  Symptoms have not been as much of a problem as he has been working from home.  However, he has to structure his entire routine around his bowel  habits when he has to leave the house and generally has to avoid eating as this always triggers a bowel movement.  He also reports accompanying nausea with diarrhea, but denies emesis.  He has never tried colestipol.    Review of Systems   Constitutional: Negative for appetite change, chills, diaphoresis, fatigue, fever and unexpected weight change.   HENT: Positive for voice change. Negative for dental problem, ear pain, mouth sores, rhinorrhea and sore throat.    Eyes: Negative for pain, redness and visual disturbance.   Respiratory: Negative for cough, chest tightness and wheezing.    Cardiovascular: Negative for chest pain, palpitations and leg swelling.   Endocrine: Negative for cold intolerance, heat intolerance, polydipsia, polyphagia and polyuria.   Genitourinary: Negative for dysuria, frequency, hematuria and urgency.   Musculoskeletal: Negative for arthralgias, back pain, joint swelling, myalgias and neck pain.   Skin: Negative for rash.   Allergic/Immunologic: Positive for environmental allergies. Negative for food allergies and immunocompromised state.   Neurological: Positive for seizures. Negative for dizziness, weakness, numbness and headaches.   Hematological: Does not bruise/bleed easily.   Psychiatric/Behavioral: Negative for sleep disturbance. The patient is not nervous/anxious.       She is outside of this outdoors and she is got samples for us she texted you and me so we can send somebody other to get another week also  Thanks okay patrons I discussed attacks recently  Problem List:    Patient Active Problem List   Diagnosis   • Atopic rhinitis   • Biliary dyskinesia   • Diarrhea   • Erectile dysfunction of nonorganic origin   • Gastroesophageal reflux disease   • Insomnia   • Prostatitis   • Chronic fatigue   • Seasonal allergies   • Right shoulder pain   • Hyperlipidemia   • Non morbid obesity due to excess calories   • Eustachian tube dysfunction   • Rotator cuff arthropathy   • Viral  syndrome   • Pain of both hip joints   • Abdominal pain   • Acute bilateral low back pain without sciatica   • Strain of Achilles tendon, initial encounter   • Depression   • Seizure (CMS/HCC)   • Atrial fibrillation (CMS/HCC)   • Cavernous malformation   • Anticoagulated   • Venous angioma of brain (CMS/HCC)   • Paroxysmal atrial fibrillation (CMS/HCC)       Medical History:    Past Medical History:   Diagnosis Date   • Allergic     seasonal   • Anxiety    • Anxiety and depression    • Cavernous malformation    • Depression    • GERD (gastroesophageal reflux disease)    • History of prostatitis    • Injury of right heel 07/27/2018   • Insomnia    • Joint pain    • Kidney stones 2017    1997, 2010 also   • Left rotator cuff tear    • Prostatitis    • Right rotator cuff tear    • Venous angioma of brain (CMS/HCC)         Social History:    Social History     Socioeconomic History   • Marital status:      Spouse name: Not on file   • Number of children: Not on file   • Years of education: Not on file   • Highest education level: Not on file   Tobacco Use   • Smoking status: Never Smoker   • Smokeless tobacco: Never Used   Substance and Sexual Activity   • Alcohol use: No   • Drug use: No   • Sexual activity: Defer       Family History:   Family History   Problem Relation Age of Onset   • Cancer Mother    • Hypertension Mother    • Nephrolithiasis Sister    • Heart attack Maternal Grandmother    • Cancer Maternal Grandmother    • Skin cancer Father    • Heart attack Maternal Grandfather    • Heart disease Maternal Grandfather    • Dementia Paternal Grandmother    • Alzheimer's disease Paternal Grandmother    • Skin cancer Paternal Grandfather        Surgical History:   Past Surgical History:   Procedure Laterality Date   • CHOLECYSTECTOMY     • COLONOSCOPY     • EXTRACORPOREAL SHOCK WAVE LITHOTRIPSY (ESWL) Right 3/24/2017    Procedure: RIGHT EXTRACORPOREAL SHOCKWAVE LITHOTRIPSY WITH CYSTOSCOPY;  Surgeon: Maksim  Jason Walker MD;  Location: [facility] OR [facility] [place];  Service:    • INGUINAL HERNIA REPAIR     • KIDNEY STONE SURGERY     • TONSILLECTOMY     • VASECTOMY           Current Outpatient Medications:   •  acetaminophen (TYLENOL) 500 MG tablet, Take 500 mg by mouth Every 6 (Six) Hours As Needed for Mild Pain ., Disp: , Rfl:   •  aspirin (ASPIRIN 81) 81 MG chewable tablet, Chew 81 mg Daily., Disp: , Rfl:   •  bisoprolol (ZEBeta) 5 MG tablet, Take 1 tablet by mouth Daily., Disp: 30 tablet, Rfl: 0  •  celecoxib (CeleBREX) 200 MG capsule, Take 1 capsule by mouth Daily. With food and water as needed lowest effective dose, Disp: 90 capsule, Rfl: 1  •  dicyclomine (BENTYL) 10 MG capsule, Take 1 capsule by mouth 3 (Three) Times a Day As Needed (abdominal pain/diarrhea)., Disp: 90 capsule, Rfl: 5  •  escitalopram (LEXAPRO) 10 MG tablet, Take 1 tablet by mouth once daily, Disp: 90 tablet, Rfl: 0  •  fluticasone (FLONASE SENSIMIST) 27.5 MCG/SPRAY nasal spray, 2 sprays into each nostril Daily., Disp: , Rfl:   •  levETIRAcetam (KEPPRA) 500 MG tablet, Take 1 tablet by mouth 2 (Two) Times a Day., Disp: 180 tablet, Rfl: 3  •  levocetirizine (XYZAL) 5 MG tablet, TAKE 1 TABLET BY MOUTH ONCE DAILY IN THE EVENING, Disp: 90 tablet, Rfl: 0  •  omeprazole (priLOSEC) 40 MG capsule, Take 1 capsule by mouth Daily., Disp: 90 capsule, Rfl: 3  •  colestipol (COLESTID) 1 g tablet, Take 1 tablet twice daily x7 days, then may increase up to 2 tablets twice daily if needed for diarrhea, Disp: 120 tablet, Rfl: 5  •  sildenafil (Viagra) 100 MG tablet, Take 1 tablet by mouth Daily As Needed for Erectile Dysfunction., Disp: 30 tablet, Rfl: 5    Allergies:   Allergies   Allergen Reactions   • Ciprofloxacin Nausea And Vomiting and Myalgia        The following portions of the patient's history were reviewed and updated as appropriate: allergies, current medications, past family history, past medical history, past social history, past surgical history and problem  list.    Vitals:    06/26/20 1156   BP: 110/70   Pulse: 63   Resp: 16   Temp: 97.3 °F (36.3 °C)   SpO2: 97%       Physical Exam   Constitutional: He is oriented to person, place, and time. He appears well-developed and well-nourished.   Pulmonary/Chest: Effort normal. No respiratory distress.   Abdominal: Soft. Bowel sounds are normal. He exhibits no distension and no mass. There is no tenderness. There is no guarding.   Musculoskeletal: Normal range of motion.   Neurological: He is alert and oriented to person, place, and time.   Skin: Skin is warm and dry.   Psychiatric: He has a normal mood and affect. His behavior is normal. Judgment and thought content normal.   Vitals reviewed.      Assessment/ Plan  Austin was seen today for follow-up.    Diagnoses and all orders for this visit:    Gastroesophageal reflux disease with esophagitis    Diarrhea, unspecified type    Other orders  -     colestipol (COLESTID) 1 g tablet; Take 1 tablet twice daily x7 days, then may increase up to 2 tablets twice daily if needed for diarrhea         Return in about 6 months (around 12/26/2020).      Discussion:  EGD and colonoscopy findings and pathology reviewed with patient today during his office visit.  As GERD symptoms seem to have greatly improved on omeprazole 40 mg once daily, we will continue this regimen.    Patient was recommended to follow-up with his PCP for findings of mucosal neuroma colonoscopy pathology report.  For suspected bile salt diarrhea, we have prescribed colestipol.  Patient to start off with 1 tablet twice daily and may increase to as many as 6 tablets daily depending upon how his symptoms respond.  I feel that his nausea is likely secondary to his diarrhea, and will improve once his diarrhea is better managed.  Plan for office follow-up in 6 months for reassessment of symptoms, or sooner should symptoms worsen or fail to improve.  Patient verbalized understanding of above plan of care and is in agreement.   All questions answered and support provided.

## 2020-06-26 ENCOUNTER — OFFICE VISIT (OUTPATIENT)
Dept: GASTROENTEROLOGY | Facility: CLINIC | Age: 45
End: 2020-06-26

## 2020-06-26 VITALS
OXYGEN SATURATION: 97 % | DIASTOLIC BLOOD PRESSURE: 70 MMHG | BODY MASS INDEX: 35.81 KG/M2 | TEMPERATURE: 97.3 F | WEIGHT: 264.4 LBS | HEIGHT: 72 IN | SYSTOLIC BLOOD PRESSURE: 110 MMHG | HEART RATE: 63 BPM | RESPIRATION RATE: 16 BRPM

## 2020-06-26 DIAGNOSIS — K21.00 GASTROESOPHAGEAL REFLUX DISEASE WITH ESOPHAGITIS: Primary | ICD-10-CM

## 2020-06-26 DIAGNOSIS — R19.7 DIARRHEA, UNSPECIFIED TYPE: ICD-10-CM

## 2020-06-26 PROCEDURE — 99214 OFFICE O/P EST MOD 30 MIN: CPT | Performed by: NURSE PRACTITIONER

## 2020-06-26 RX ORDER — MONTELUKAST SODIUM 4 MG/1
TABLET, CHEWABLE ORAL
Qty: 120 TABLET | Refills: 5 | Status: SHIPPED | OUTPATIENT
Start: 2020-06-26 | End: 2021-05-12 | Stop reason: SDUPTHER

## 2020-06-26 NOTE — PATIENT INSTRUCTIONS
1.  For GERD, continue omeprazole 40 mg once daily.    2.  For GERD, we recommend avoiding eating 3-4 hours before bedtime, eating smaller more frequent meals, and avoiding any known food triggers including spicy foods, tomatoes and tomato-based sauces, chocolate, coffee/tea, citrus fruits, carbonated  beverages and alcohol.     3.  For suspected bile salt diarrhea, we will have you start colestipol 1 g tablets.  You will start off taking 1 tablet twice daily x7 days, then can titrate colestipol as discussed.  Would recommend slow titration.  Maximum dose is 6 tablets/day.  This prescription has been sent to your pharmacy.    4.  For findings of a mucosal neuroma on your colonoscopy, we recommend follow-up with your PCP to see if any further work-up would be recommended.    5.  Plan for office follow-up in 6 months for reassessment of symptoms, or sooner should symptoms worsen or fail to improve.

## 2020-06-29 RX ORDER — MELOXICAM 15 MG/1
15 TABLET ORAL DAILY
Qty: 90 TABLET | Refills: 0 | Status: SHIPPED | OUTPATIENT
Start: 2020-06-29 | End: 2020-09-02

## 2020-06-29 RX ORDER — LEVOCETIRIZINE DIHYDROCHLORIDE 5 MG/1
TABLET, FILM COATED ORAL
Qty: 90 TABLET | Refills: 0 | Status: SHIPPED | OUTPATIENT
Start: 2020-06-29 | End: 2020-10-01

## 2020-06-30 RX ORDER — BISOPROLOL FUMARATE 5 MG/1
TABLET, FILM COATED ORAL
Qty: 30 TABLET | Refills: 0 | Status: SHIPPED | OUTPATIENT
Start: 2020-06-30 | End: 2020-08-03

## 2020-07-01 ENCOUNTER — TELEMEDICINE (OUTPATIENT)
Dept: FAMILY MEDICINE CLINIC | Facility: CLINIC | Age: 45
End: 2020-07-01

## 2020-07-01 DIAGNOSIS — J02.9 PHARYNGITIS, UNSPECIFIED ETIOLOGY: Primary | ICD-10-CM

## 2020-07-01 PROCEDURE — 99213 OFFICE O/P EST LOW 20 MIN: CPT | Performed by: NURSE PRACTITIONER

## 2020-07-01 NOTE — PATIENT INSTRUCTIONS
COVID-19 Frequently Asked Questions  COVID-19 (coronavirus disease) is an infection that is caused by a large family of viruses. Some viruses cause illness in people and others cause illness in animals like camels, cats, and bats. In some cases, the viruses that cause illness in animals can spread to humans.  Where did the coronavirus come from?  In December 2019, [place] told the World Health Organization (WHO) of several cases of lung disease (human respiratory illness). These cases were linked to an open seafood and livestock market in the city of [facility]. The link to the seafood and livestock market suggests that the virus may have spread from animals to humans. However, since that first outbreak in December, the virus has also been shown to spread from person to person.  What is the name of the disease and the virus?  Disease name  Early on, this disease was called novel coronavirus. This is because scientists determined that the disease was caused by a new (novel) respiratory virus. The World Health Organization (WHO) has now named the disease COVID-19, or coronavirus disease.  Virus name  The virus that causes the disease is called severe acute respiratory syndrome coronavirus 2 (SARS-CoV-2).  More information on disease and virus naming  World Health Organization (WHO): www.who.int/emergencies/diseases/novel-coronavirus-2019/technical-guidance/naming-the-coronavirus-disease-(covid-2019)-and-the-virus-that-causes-it  Who is at risk for complications from coronavirus disease?  Some people may be at higher risk for complications from coronavirus disease. This includes older adults and people who have chronic diseases, such as heart disease, diabetes, and lung disease.  If you are at higher risk for complications, take these extra precautions:  · Avoid close contact with people who are sick or have a fever or cough. Stay at least 3-6 ft (1-2 m) away from them, if possible.  · Wash your hands often with soap and  water for at least 20 seconds.  · Avoid touching your face, mouth, nose, or eyes.  · Keep supplies on hand at home, such as food, medicine, and cleaning supplies.  · Stay home as much as possible.  · Avoid social gatherings and travel.  How does coronavirus disease spread?  The virus that causes coronavirus disease spreads easily from person to person (is contagious). There are also cases of community-spread disease. This means the disease has spread to:  · People who have no known contact with other infected people.  · People who have not traveled to areas where there are known cases.  It appears to spread from one person to another through droplets from coughing or sneezing.  Can I get the virus from touching surfaces or objects?  There is still a lot that we do not know about the virus that causes coronavirus disease. Scientists are basing a lot of information on what they know about similar viruses, such as:  · Viruses cannot generally survive on surfaces for long. They need a human body (host) to survive.  · It is more likely that the virus is spread by close contact with people who are sick (direct contact), such as through:  ? Shaking hands or hugging.  ? Breathing in respiratory droplets that travel through the air. This can happen when an infected person coughs or sneezes on or near other people.  · It is less likely that the virus is spread when a person touches a surface or object that has the virus on it (indirect contact). The virus may be able to enter the body if the person touches a surface or object and then touches his or her face, eyes, nose, or mouth.  Can a person spread the virus without having symptoms of the disease?  It may be possible for the virus to spread before a person has symptoms of the disease, but this is most likely not the main way the virus is spreading. It is more likely for the virus to spread by being in close contact with people who are sick and breathing in the respiratory  droplets of a sick person's cough or sneeze.  What are the symptoms of coronavirus disease?  Symptoms vary from person to person and can range from mild to severe. Symptoms may include:  · Fever.  · Cough.  · Tiredness, weakness, or fatigue.  · Fast breathing or feeling short of breath.  These symptoms can appear anywhere from 2 to 14 days after you have been exposed to the virus. If you develop symptoms, call your health care provider. People with severe symptoms may need hospital care.  If I am exposed to the virus, how long does it take before symptoms start?  Symptoms of coronavirus disease may appear anywhere from 2 to 14 days after a person has been exposed to the virus. If you develop symptoms, call your health care provider.  Should I be tested for this virus?  Your health care provider will decide whether to test you based on your symptoms, history of exposure, and your risk factors.  How does a health care provider test for this virus?  Health care providers will collect samples to send for testing. Samples may include:  · Taking a swab of fluid from the nose.  · Taking fluid from the lungs by having you cough up mucus (sputum) into a sterile cup.  · Taking a blood sample.  · Taking a stool or urine sample.  Is there a treatment or vaccine for this virus?  Currently, there is no vaccine to prevent coronavirus disease. Also, there are no medicines like antibiotics or antivirals to treat the virus. A person who becomes sick is given supportive care, which means rest and fluids. A person may also relieve his or her symptoms by using over-the-counter medicines that treat sneezing, coughing, and runny nose. These are the same medicines that a person takes for the common cold.  If you develop symptoms, call your health care provider. People with severe symptoms may need hospital care.  What can I do to protect myself and my family from this virus?         You can protect yourself and your family by taking the  same actions that you would take to prevent the spread of other viruses. Take the following actions:  · Wash your hands often with soap and water for at least 20 seconds. If soap and water are not available, use alcohol-based hand .  · Avoid touching your face, mouth, nose, or eyes.  · Cough or sneeze into a tissue, sleeve, or elbow. Do not cough or sneeze into your hand or the air.  ? If you cough or sneeze into a tissue, throw it away immediately and wash your hands.  · Disinfect objects and surfaces that you frequently touch every day.  · Avoid close contact with people who are sick or have a fever or cough. Stay at least 3-6 ft (1-2 m) away from them, if possible.  · Stay home if you are sick, except to get medical care. Call your health care provider before you get medical care.  · Make sure your vaccines are up to date. Ask your health care provider what vaccines you need.  What should I do if I need to travel?  Follow travel recommendations from your local health authority, the CDC, and WHO.  Travel information and advice  · Centers for Disease Control and Prevention (CDC): www.cdc.gov/coronavirus/2019-ncov/travelers/index.html  · World Health Organization (WHO): www.who.int/emergencies/diseases/novel-coronavirus-2019/travel-advice  Know the risks and take action to protect your health  · You are at higher risk of getting coronavirus disease if you are traveling to areas with an outbreak or if you are exposed to travelers from areas with an outbreak.  · Wash your hands often and practice good hygiene to lower the risk of catching or spreading the virus.  What should I do if I am sick?  General instructions to stop the spread of infection  · Wash your hands often with soap and water for at least 20 seconds. If soap and water are not available, use alcohol-based hand .  · Cough or sneeze into a tissue, sleeve, or elbow. Do not cough or sneeze into your hand or the air.  · If you cough or  sneeze into a tissue, throw it away immediately and wash your hands.  · Stay home unless you must get medical care. Call your health care provider or local health authority before you get medical care.  · Avoid public areas. Do not take public transportation, if possible.  · If you can, wear a mask if you must go out of the house or if you are in close contact with someone who is not sick.  Keep your home clean  · Disinfect objects and surfaces that are frequently touched every day. This may include:  ? Counters and tables.  ? Doorknobs and light switches.  ? Sinks and faucets.  ? Electronics such as phones, remote controls, keyboards, computers, and tablets.  · Wash dishes in hot, soapy water or use a . Air-dry your dishes.  · Wash laundry in hot water.  Prevent infecting other household members  · Let healthy household members care for children and pets, if possible. If you have to care for children or pets, wash your hands often and wear a mask.  · Sleep in a different bedroom or bed, if possible.  · Do not share personal items, such as razors, toothbrushes, deodorant, hernandez, brushes, towels, and washcloths.  Where to find more information  Centers for Disease Control and Prevention (CDC)  · Information and news updates: www.cdc.gov/coronavirus/2019-ncov  World Health Organization (WHO)  · Information and news updates: www.who.int/emergencies/diseases/novel-coronavirus-2019  · Coronavirus health topic: www.who.int/health-topics/coronavirus  · Questions and answers on COVID-19: www.who.int/news-room/q-a-detail/b-b-htgeduljrstkj  · Global tracker: who.[employer]  American Academy of Pediatrics (AAP)  · Information for families: www.healthychildren.org/English/health-issues/conditions/chest-lungs/Pages/2019-Novel-Coronavirus.aspx  The coronavirus situation is changing rapidly. Check your local health authority website or the CDC and WHO websites for updates and news.  When should I contact a health care  provider?  · Contact your health care provider if you have symptoms of an infection, such as fever or cough, and you:  ? Have been near anyone who is known to have coronavirus disease.  ? Have come into contact with a person who is suspected to have coronavirus disease.  ? Have traveled outside of the country.  When should I get emergency medical care?  · Get help right away by calling your local emergency services (911 in the [place] if you have:  ? Trouble breathing.  ? Pain or pressure in your chest.  ? Confusion.  ? Blue-tinged lips and fingernails.  ? Difficulty waking from sleep.  ? Symptoms that get worse.  Let the emergency medical personnel know if you think you have coronavirus disease.  Summary  · A new respiratory virus is spreading from person to person and causing COVID-19 (coronavirus disease).  · The virus that causes COVID-19 appears to spread easily. It spreads from one person to another through droplets from coughing or sneezing.  · Older adults and those with chronic diseases are at higher risk of disease. If you are at higher risk for complications, take extra precautions.  · There is currently no vaccine to prevent coronavirus disease. There are no medicines, such as antibiotics or antivirals, to treat the virus.  · You can protect yourself and your family by washing your hands often, avoiding touching your face, and covering your coughs and sneezes.  This information is not intended to replace advice given to you by your health care provider. Make sure you discuss any questions you have with your health care provider.  Document Released: 04/14/2020 Document Revised: 04/14/2020 Document Reviewed: 04/14/2020  Elsevier Patient Education © 2020 [employer] Inc.

## 2020-07-01 NOTE — PROGRESS NOTES
Austin Orta    1975  [id number]        [facility]  New patient to me.  Requested video visit for concerns about COVID.     Symptoms began Monday and are not unusual for him. Woke up with PND and congestion which are normal for him.  But yesterday was fatigued and he developed a sore throat.   PND about gone. Little bit of headache yesterday but this resolved with tylenol.   Sore throat is better, no loss of taste/smell.  Has not had a fever-temp has been running in the 97.0s.  He denies GI sx, cough, dyspnea.     His concern is that he has had 2 co workers who have become ill.  One was COVID+. The other is pending. He does not think he has been around them leading up to their symptoms.  He does admit that at work they do not wear masks but do try to social distance.  No other known exposures.  Lives with spouse who is not symptomatic.  They went to a restaurant about 1 week prior to sx and he has noticed that when he goes to stores that although he is masked others are not.        Had colonoscopy appr 1 month ago and was negative then for COVID.     Review of Systems - General ROS: negative for - chills or fever  ENT ROS: negative for - sneezing or vocal changes  Allergy and Immunology ROS: positive for - nasal congestion, postnasal drip and seasonal allergies  Respiratory ROS: no cough, shortness of breath, or wheezing  Cardiovascular ROS: no chest pain or dyspnea on exertion  Gastrointestinal ROS: negative for - abdominal pain, appetite loss, diarrhea or nausea/vomiting     Physical:  Limited by video visit. He seems to be mildly ill appearing which could be his baseline allergy appearance since I have not seen him before.  He is congested appearing and sounding.  He does not seem to be distressed.  He seems alert and oriented and his mood and affect are normal, good historian of medical history.  No cough or dyspnea appreciated, able to complete sentences without problem.     Plan: Could be normal allergies but  he has also had some risk with COVID+ coworkers.  I think it is best to be COVID tested and he agrees with this.  He will go to  today for test.  We discussed monitoring, when results should be available.  I recommend quarantine until results back for himself and spouse.  We discussed s/s requiring emergent tx.   Contact tracing and role of HD and quarantine if positive results obtained.  Reinforced self monitoring, continued social distance and mask use when out in public or at work.     Diagnoses and all orders for this visit:    Pharyngitis, unspecified etiology  -     COVID-19,[facility] ROUTINE, NP/OP SWAB IN TRANSPORT MEDIA OR ESWAB 72 HR TAT - Swab, Nasopharynx; Future        Any medications prescribed have been sent electronically to   [email or web address] - [place], [place] - [address] - [phone] PH - [phone] FX  [address] [facility] [address]  Phone: [phone] Fax: [phone]    [facility] - [place], [place] - [address] - [phone] PH - [phone] FX  [facility]  [address]  [facility] [address]  Phone: [phone] Fax: [phone]    [facility] [address] [place], [place] - [address] - [phone] PH - [phone] FX  [address]  [facility] [address]  Phone: [phone] Fax: [phone]    [facility] [place], [place] - [address] - [phone] PH - [phone] FX  [address] [facility] [address]  Phone: [phone] Fax: [phone]        Joleen Carrington, RASHID  07/01/20  10:21 AM    Patient gave consent today for a telehealth video visit as following recommendations of our governor and CDC during the COVID-19 pandemic.    11 min was spent in discussion with pt and greater than 50% of that time was spent counseling.    Zoom platform through epic used with good audiovisual quality noted.

## 2020-08-03 RX ORDER — BISOPROLOL FUMARATE 5 MG/1
TABLET, FILM COATED ORAL
Qty: 30 TABLET | Refills: 0 | Status: SHIPPED | OUTPATIENT
Start: 2020-08-03 | End: 2020-08-28

## 2020-08-25 DIAGNOSIS — F32.A DEPRESSION, UNSPECIFIED DEPRESSION TYPE: ICD-10-CM

## 2020-08-25 RX ORDER — ESCITALOPRAM OXALATE 10 MG/1
TABLET ORAL
Qty: 90 TABLET | Refills: 0 | Status: SHIPPED | OUTPATIENT
Start: 2020-08-25 | End: 2020-09-03 | Stop reason: SDUPTHER

## 2020-08-28 RX ORDER — BISOPROLOL FUMARATE 5 MG/1
TABLET, FILM COATED ORAL
Qty: 30 TABLET | Refills: 0 | Status: SHIPPED | OUTPATIENT
Start: 2020-08-28 | End: 2020-10-01

## 2020-09-02 RX ORDER — MELOXICAM 15 MG/1
15 TABLET ORAL DAILY
Qty: 90 TABLET | Refills: 1 | Status: SHIPPED | OUTPATIENT
Start: 2020-09-02 | End: 2020-09-03 | Stop reason: SDUPTHER

## 2020-09-03 ENCOUNTER — HOSPITAL ENCOUNTER (OUTPATIENT)
Dept: GENERAL RADIOLOGY | Facility: HOSPITAL | Age: 45
Discharge: HOME | End: 2020-09-03
Admitting: NURSE PRACTITIONER

## 2020-09-03 ENCOUNTER — OFFICE VISIT (OUTPATIENT)
Dept: FAMILY MEDICINE CLINIC | Facility: CLINIC | Age: 45
End: 2020-09-03

## 2020-09-03 VITALS
BODY MASS INDEX: 36.03 KG/M2 | DIASTOLIC BLOOD PRESSURE: 81 MMHG | OXYGEN SATURATION: 99 % | SYSTOLIC BLOOD PRESSURE: 134 MMHG | HEART RATE: 68 BPM | TEMPERATURE: 98 F | HEIGHT: 72 IN | WEIGHT: 266 LBS

## 2020-09-03 DIAGNOSIS — M25.469 KNEE SWELLING: ICD-10-CM

## 2020-09-03 DIAGNOSIS — E78.2 MIXED HYPERLIPIDEMIA: ICD-10-CM

## 2020-09-03 DIAGNOSIS — F32.A DEPRESSION, UNSPECIFIED DEPRESSION TYPE: Primary | ICD-10-CM

## 2020-09-03 DIAGNOSIS — Z79.899 HIGH RISK MEDICATION USE: ICD-10-CM

## 2020-09-03 DIAGNOSIS — R56.9 SEIZURE (HCC): ICD-10-CM

## 2020-09-03 PROCEDURE — 73560 X-RAY EXAM OF KNEE 1 OR 2: CPT

## 2020-09-03 PROCEDURE — 99213 OFFICE O/P EST LOW 20 MIN: CPT | Performed by: NURSE PRACTITIONER

## 2020-09-03 RX ORDER — ESCITALOPRAM OXALATE 10 MG/1
10 TABLET ORAL DAILY
Qty: 90 TABLET | Refills: 1 | Status: SHIPPED | OUTPATIENT
Start: 2020-09-03 | End: 2021-04-30

## 2020-09-03 RX ORDER — MELOXICAM 15 MG/1
15 TABLET ORAL DAILY
Qty: 90 TABLET | Refills: 1 | Status: SHIPPED | OUTPATIENT
Start: 2020-09-03 | End: 2021-03-04 | Stop reason: SDUPTHER

## 2020-09-03 NOTE — PROGRESS NOTES
Very pleasant gentleman here today for follow-up  Chronic anxiety managed well  Takes Lexapro 10 mg moderate dose he would like to continue no problems with medication    He has arthritis

## 2020-09-09 ENCOUNTER — RESULTS ENCOUNTER (OUTPATIENT)
Dept: FAMILY MEDICINE CLINIC | Facility: CLINIC | Age: 45
End: 2020-09-09

## 2020-09-09 DIAGNOSIS — F32.A DEPRESSION, UNSPECIFIED DEPRESSION TYPE: ICD-10-CM

## 2020-09-09 DIAGNOSIS — R56.9 SEIZURE (HCC): ICD-10-CM

## 2020-09-09 DIAGNOSIS — E78.2 MIXED HYPERLIPIDEMIA: ICD-10-CM

## 2020-09-09 DIAGNOSIS — Z79.899 HIGH RISK MEDICATION USE: ICD-10-CM

## 2020-09-09 NOTE — PROGRESS NOTES
"Subjective   Austin Orta is a 45 y.o. male.     Very pleasant gentleman here today for follow-up  Chronic anxiety managed well  Takes Lexapro 10 mg moderate dose he would like to continue no problems with medication    Patient has of not right knee noticed over the last year no pain no fever chills or night sweats does not bother him but he is had several people asked him about attendance not when away we discussed this previously  No history of injury  No change in 6 months  Mixed hyperlipidemia stable take statin       /81   Pulse 68   Temp 98 °F (36.7 °C) (Temporal)   Ht 182.9 cm (72\")   Wt 121 kg (266 lb)   SpO2 99%   BMI 36.08 kg/m²       The following portions of the patient's history were reviewed and updated as appropriate: allergies, current medications, past family history, past medical history, past social history, past surgical history and problem list.    Review of Systems    Objective   Physical Exam   Constitutional: He is oriented to person, place, and time. He appears well-developed and well-nourished. No distress.   HENT:   Head: Normocephalic and atraumatic.   Nose: Nose normal.   Mouth/Throat: Oropharynx is clear and moist.   Eyes: Pupils are equal, round, and reactive to light. Conjunctivae are normal.   Neck: Neck supple. No JVD present.   Cardiovascular: Normal rate, regular rhythm and normal heart sounds.   No murmur heard.  Pulmonary/Chest: Effort normal and breath sounds normal. No respiratory distress. He has no wheezes.   Abdominal: Soft. Bowel sounds are normal. He exhibits no distension and no mass. There is no tenderness. There is no guarding. No hernia.   Musculoskeletal: He exhibits no edema or tenderness.   Right knee full range of motion no crepitus right knee proximal articulation palpable and somewhat more noticeable on the right than left.  To me know overt abnormality and patient is more impressed with the size than I am  Subtle sponginess over top the " articulation as he may have a small cyst which overlying articulation?  No effusion redness no laxity of the joint otherwise a normal gait     Lymphadenopathy:     He has no cervical adenopathy.   Neurological: He is alert and oriented to person, place, and time.   Skin: Skin is warm and dry. He is not diaphoretic.   Psychiatric: He has a normal mood and affect. His behavior is normal. Judgment and thought content normal.   Vitals reviewed.        Assessment/Plan   Austin was seen today for cyct on left knee.    Diagnoses and all orders for this visit:    Depression, unspecified depression type  -     escitalopram (LEXAPRO) 10 MG tablet; Take 1 tablet by mouth Daily.  -     CBC & Differential; Future  -     Comprehensive Metabolic Panel; Future  -     Lipid Panel With LDL / HDL Ratio; Future  -     TSH Rfx On Abnormal To Free T4; Future  -     Urinalysis With Microscopic If Indicated (No Culture) - Urine, Clean Catch; Future    Seizure (CMS/HCC)  -     CBC & Differential; Future  -     Comprehensive Metabolic Panel; Future  -     Lipid Panel With LDL / HDL Ratio; Future  -     TSH Rfx On Abnormal To Free T4; Future  -     Urinalysis With Microscopic If Indicated (No Culture) - Urine, Clean Catch; Future    High risk medication use  -     CBC & Differential; Future  -     Comprehensive Metabolic Panel; Future  -     Lipid Panel With LDL / HDL Ratio; Future  -     TSH Rfx On Abnormal To Free T4; Future  -     Urinalysis With Microscopic If Indicated (No Culture) - Urine, Clean Catch; Future    Mixed hyperlipidemia  -     CBC & Differential; Future  -     Comprehensive Metabolic Panel; Future  -     Lipid Panel With LDL / HDL Ratio; Future  -     TSH Rfx On Abnormal To Free T4; Future  -     Urinalysis With Microscopic If Indicated (No Culture) - Urine, Clean Catch; Future    Knee swelling  -     XR knee 1 or 2 vw right    Other orders  -     meloxicam (MOBIC) 15 MG tablet; Take 1 tablet by mouth Daily.      For chronic  arthritis he can take meloxicam  As he is requesting this specifically works better than Celebrex for patient  Risk-benefit strategy to mitigate risk lowest effective dose shortest time possible  X-rays precaution regarding his knee although I think this will be normal should he have increased pain swelling in this area he should see orthopedist  Labs today healthy diet regular exercise he will follow-up in 6 months otherwise continue to social distance get COVID vaccine and flu shot when available            There are no Patient Instructions on file for this visit.

## 2020-09-25 NOTE — PROGRESS NOTES
3MO    Subjective:        Austin Orta is a 45 y.o. male who here for follow up    CC  Loop    afib  HPI  45 years old male with known history of paroxysmal atrial fibrillation, hyperlipidemia and anticoagulation, here for the discussion of the loop recorder     Problem List Items Addressed This Visit        Cardiovascular and Mediastinum    Paroxysmal atrial fibrillation (CMS/HCC)    Hyperlipidemia - Primary       Other    Anticoagulated        .    The following portions of the patient's history were reviewed and updated as appropriate: allergies, current medications, past family history, past medical history, past social history, past surgical history and problem list.    Past Medical History:   Diagnosis Date   • Allergic     seasonal   • Anxiety    • Anxiety and depression    • Cavernous malformation    • Depression    • GERD (gastroesophageal reflux disease)    • History of prostatitis    • Injury of right heel 07/27/2018   • Insomnia    • Joint pain    • Kidney stones 2017 1997, 2010 also   • Left rotator cuff tear    • Prostatitis    • Right rotator cuff tear    • Venous angioma of brain (CMS/HCC)      reports that he has never smoked. He has never used smokeless tobacco. He reports that he does not drink alcohol or use drugs.   Family History   Problem Relation Age of Onset   • Cancer Mother    • Hypertension Mother    • Nephrolithiasis Sister    • Heart attack Maternal Grandmother    • Cancer Maternal Grandmother    • Skin cancer Father    • Heart attack Maternal Grandfather    • Heart disease Maternal Grandfather    • Dementia Paternal Grandmother    • Alzheimer's disease Paternal Grandmother    • Skin cancer Paternal Grandfather        Review of Systems  Constitutional: No wt loss, fever, fatigue  Gastrointestinal: No nausea, abdominal pain  Behavioral/Psych: No insomnia or anxiety   Cardiovascular no chest pains or tightness in the chest  Objective:       Physical Exam  /85   Pulse 67   Ht  "182.9 cm (72\")   Wt 120 kg (264 lb)   BMI 35.80 kg/m²   General appearance: No acute changes   Neck: Trachea midline; NECK, supple, no thyromegaly or lymphadenopathy   Lungs: Normal size and shape, normal breath sounds, equal distribution of air, no rales and rhonchi   CV: S1-S2 regular, no murmurs, no rub, no gallop   Abdomen: Soft, non-tender; no masses , no abnormal abdominal sounds   Extremities: No deformity , normal color , no peripheral edema   Skin: Normal temperature, turgor and texture; no rash, ulcers            ECG 12 Lead    Date/Time: 9/28/2020 9:52 AM  Performed by: Bashir Syed MD  Authorized by: Bashir Syed MD   Comparison: compared with previous ECG   Similar to previous ECG  Rhythm: sinus rhythm  ST Flattening: all    Clinical impression: normal ECG              Echocardiogram:        Current Outpatient Medications:   •  acetaminophen (TYLENOL) 500 MG tablet, Take 500 mg by mouth Every 6 (Six) Hours As Needed for Mild Pain ., Disp: , Rfl:   •  aspirin (ASPIRIN 81) 81 MG chewable tablet, Chew 81 mg Daily., Disp: , Rfl:   •  bisoprolol (ZEBeta) 5 MG tablet, TAKE 1 TABLET BY MOUTH ONCE DAILY **NEEDS  APPOINTMENT**, Disp: 30 tablet, Rfl: 0  •  colestipol (COLESTID) 1 g tablet, Take 1 tablet twice daily x7 days, then may increase up to 2 tablets twice daily if needed for diarrhea, Disp: 120 tablet, Rfl: 5  •  escitalopram (LEXAPRO) 10 MG tablet, Take 1 tablet by mouth Daily., Disp: 90 tablet, Rfl: 1  •  fluticasone (FLONASE SENSIMIST) 27.5 MCG/SPRAY nasal spray, 2 sprays into each nostril Daily., Disp: , Rfl:   •  levETIRAcetam (KEPPRA) 500 MG tablet, Take 1 tablet by mouth 2 (Two) Times a Day., Disp: 180 tablet, Rfl: 3  •  levocetirizine (XYZAL) 5 MG tablet, TAKE 1 TABLET BY MOUTH ONCE DAILY IN THE EVENING, Disp: 90 tablet, Rfl: 0  •  meloxicam (MOBIC) 15 MG tablet, Take 1 tablet by mouth Daily., Disp: 90 tablet, Rfl: 1  •  omeprazole (priLOSEC) 40 MG capsule, Take 1 capsule by " mouth Daily., Disp: 90 capsule, Rfl: 3  •  sildenafil (Viagra) 100 MG tablet, Take 1 tablet by mouth Daily As Needed for Erectile Dysfunction., Disp: 30 tablet, Rfl: 5   Assessment:        Patient Active Problem List   Diagnosis   • Atopic rhinitis   • Biliary dyskinesia   • Diarrhea   • Erectile dysfunction of nonorganic origin   • Gastroesophageal reflux disease   • Insomnia   • Prostatitis   • Chronic fatigue   • Seasonal allergies   • Right shoulder pain   • Hyperlipidemia   • Non morbid obesity due to excess calories   • Eustachian tube dysfunction   • Rotator cuff arthropathy   • Viral syndrome   • Pain of both hip joints   • Abdominal pain   • Acute bilateral low back pain without sciatica   • Strain of Achilles tendon, initial encounter   • Depression   • Seizure (CMS/HCC)   • Atrial fibrillation (CMS/HCC)   • Cavernous malformation   • Anticoagulated   • Venous angioma of brain (CMS/HCC)   • Paroxysmal atrial fibrillation (CMS/HCC)               Plan:            ICD-10-CM ICD-9-CM   1. Mixed hyperlipidemia  E78.2 272.2   2. Paroxysmal atrial fibrillation (CMS/HCC)  I48.0 427.31   3. Anticoagulated  Z79.01 V58.61     1. Mixed hyperlipidemia  Continue current treatment    2. Paroxysmal atrial fibrillation (CMS/HCC)  Controlled    3. Anticoagulated  Counseling done    COUNSELING:    Austin Griffiths was given to patient for the following topics: diagnostic results, risk factor reductions, impressions, risks and benefits of treatment options and importance of treatment compliance .       SMOKING COUNSELING:    [unfilled]    Dictated using Dragon dictation

## 2020-09-28 ENCOUNTER — OFFICE VISIT (OUTPATIENT)
Dept: CARDIOLOGY | Facility: CLINIC | Age: 45
End: 2020-09-28

## 2020-09-28 VITALS
HEART RATE: 67 BPM | DIASTOLIC BLOOD PRESSURE: 85 MMHG | BODY MASS INDEX: 35.76 KG/M2 | SYSTOLIC BLOOD PRESSURE: 131 MMHG | HEIGHT: 72 IN | WEIGHT: 264 LBS

## 2020-09-28 DIAGNOSIS — Z79.01 ANTICOAGULATED: ICD-10-CM

## 2020-09-28 DIAGNOSIS — E78.2 MIXED HYPERLIPIDEMIA: Primary | ICD-10-CM

## 2020-09-28 DIAGNOSIS — I48.0 PAROXYSMAL ATRIAL FIBRILLATION (HCC): ICD-10-CM

## 2020-09-28 PROCEDURE — 93000 ELECTROCARDIOGRAM COMPLETE: CPT | Performed by: INTERNAL MEDICINE

## 2020-09-28 PROCEDURE — 99213 OFFICE O/P EST LOW 20 MIN: CPT | Performed by: INTERNAL MEDICINE

## 2020-10-01 ENCOUNTER — OFFICE VISIT (OUTPATIENT)
Dept: NEUROLOGY | Facility: CLINIC | Age: 45
End: 2020-10-01

## 2020-10-01 VITALS
HEART RATE: 66 BPM | WEIGHT: 265 LBS | DIASTOLIC BLOOD PRESSURE: 82 MMHG | BODY MASS INDEX: 35.89 KG/M2 | HEIGHT: 72 IN | SYSTOLIC BLOOD PRESSURE: 120 MMHG | OXYGEN SATURATION: 99 %

## 2020-10-01 DIAGNOSIS — R56.9 SEIZURE (HCC): Primary | ICD-10-CM

## 2020-10-01 PROCEDURE — 99213 OFFICE O/P EST LOW 20 MIN: CPT | Performed by: NURSE PRACTITIONER

## 2020-10-01 RX ORDER — LEVETIRACETAM 500 MG/1
TABLET ORAL
Qty: 180 TABLET | Refills: 0 | Status: SHIPPED | OUTPATIENT
Start: 2020-10-01 | End: 2020-11-19

## 2020-10-01 RX ORDER — LEVOCETIRIZINE DIHYDROCHLORIDE 5 MG/1
TABLET, FILM COATED ORAL
Qty: 90 TABLET | Refills: 3 | Status: SHIPPED | OUTPATIENT
Start: 2020-10-01 | End: 2021-07-01

## 2020-10-01 NOTE — TELEPHONE ENCOUNTER
Pt last seen 9/27/2019. Follow up scheduled today.       Quantity 180 for 90 days.      Please review and approve or advise.     Thank you.

## 2020-10-01 NOTE — PROGRESS NOTES
DOS: 10/1/2020  NAME: Austin Orta   : 1975  PCP: Epley, James, APRN    Chief Complaint   Patient presents with   • Seizures      SUBJECTIVE  Neurological Problem:  45 y.o. RHW male with seizures, cavernous angioma and venous anomaly and Afib s/p cardioversion and h/o kidney stones who presents today for seizure follow-up.  He is unaccompanied.    Interval History:   Mr. Orta presented to [facility] on 19 with new onset seizures. He was lying on the couch with spouse, fell asleep watching a movie but the dog barking woke her up she witnessed him having generalized shaking and AMS for a couple of minutes.  Per EMS, patient was found to be in atrial fibrillation and had a second seizure while in route to the ED.  CT head showed a left frontal mass with follow-up MRI showed a very small cavernous malformation in the medial left subfrontal region.  He was evaluated by neurosurgery and had recent follow-up scans confirming left parasagittal frontal lobe cavernous malformation and small left frontal adjacent venous angioma. He was treated with Keppra and discharged on 500 mg BID. His keppra level was subtherapeutic at 8.6 and dose was later titrated to 750 mg BID; for, due to some mood complaints he was tapered back to 500 mg twice daily.    He presents today and continues on Keppra 500 mg twice daily and denies any seizure-like activity since his last visit.  No adverse effects.  He continues to be followed by cardiology, no evidence of A. fib following his cardioversion, although cardiology recommending ILR placement.  Patient is considering, has reservations due to cost.  He denies any changes in his health since his last visit.  He denies smoking.  No alcohol use.  No falls.    Review of Systems:Review of Systems   Constitutional: Positive for fatigue. Negative for activity change and appetite change.   HENT: Negative for ear pain, tinnitus and trouble swallowing.    Eyes: Negative for photophobia, pain and  visual disturbance.   Musculoskeletal: Positive for back pain. Negative for gait problem and neck pain.   Neurological: Negative for dizziness, tremors, seizures, syncope, facial asymmetry, speech difficulty, weakness, light-headedness, numbness and headaches.   Psychiatric/Behavioral: Positive for agitation and sleep disturbance. Negative for behavioral problems, confusion, decreased concentration, dysphoric mood, hallucinations, self-injury and suicidal ideas. The patient is not nervous/anxious and is not hyperactive.     Above ROS reviewed    The following portions of the patient's history were reviewed and updated as appropriate: allergies, current medications, past family history, past medical history, past social history, past surgical history and problem list.    Current Medications:   Current Outpatient Medications:   •  acetaminophen (TYLENOL) 500 MG tablet, Take 500 mg by mouth Every 6 (Six) Hours As Needed for Mild Pain ., Disp: , Rfl:   •  aspirin (ASPIRIN 81) 81 MG chewable tablet, Chew 81 mg Daily., Disp: , Rfl:   •  bisoprolol (ZEBeta) 5 MG tablet, TAKE 1 TABLET BY MOUTH ONCE DAILY **NEEDS  APPOINTMENT**, Disp: 30 tablet, Rfl: 0  •  colestipol (COLESTID) 1 g tablet, Take 1 tablet twice daily x7 days, then may increase up to 2 tablets twice daily if needed for diarrhea, Disp: 120 tablet, Rfl: 5  •  escitalopram (LEXAPRO) 10 MG tablet, Take 1 tablet by mouth Daily., Disp: 90 tablet, Rfl: 1  •  fluticasone (FLONASE SENSIMIST) 27.5 MCG/SPRAY nasal spray, 2 sprays into each nostril Daily., Disp: , Rfl:   •  levETIRAcetam (KEPPRA) 500 MG tablet, Take 1 tablet by mouth 2 (Two) Times a Day., Disp: 180 tablet, Rfl: 3  •  levocetirizine (XYZAL) 5 MG tablet, TAKE 1 TABLET BY MOUTH ONCE DAILY IN THE EVENING, Disp: 90 tablet, Rfl: 0  •  meloxicam (MOBIC) 15 MG tablet, Take 1 tablet by mouth Daily., Disp: 90 tablet, Rfl: 1  •  omeprazole (priLOSEC) 40 MG capsule, Take 1 capsule by mouth Daily., Disp: 90 capsule,  Rfl: 3  •  sildenafil (Viagra) 100 MG tablet, Take 1 tablet by mouth Daily As Needed for Erectile Dysfunction., Disp: 30 tablet, Rfl: 5  **I did not stop or change the above medications.  Patient's medication list was updated to reflect medications they have reported as currently taking, including medication changes made by other providers.    OBJECTIVE  There were no vitals filed for this visit.  Body mass index is 35.8 kg/m².    Diagnostics:  EKG 9/28/2020: Sinus rhythm    Laboratory Results:         Lab Results   Component Value Date    WBC 9.73 08/29/2019    HGB 15.6 08/29/2019    HCT 50.8 08/29/2019    MCV 91.2 08/29/2019     08/29/2019     Lab Results   Component Value Date    GLUCOSE 120 (H) 01/25/2019    BUN 18 08/29/2019    CREATININE 0.74 (L) 08/29/2019    EGFRIFNONA 114 08/29/2019    EGFRIFAFRI 139 08/29/2019    BCR 24.3 08/29/2019    K 4.7 08/29/2019    CO2 25.7 08/29/2019    CALCIUM 9.5 08/29/2019    PROTENTOTREF 7.1 08/29/2019    ALBUMIN 5.00 08/29/2019    LABIL2 2.4 08/29/2019    AST 17 08/29/2019    ALT 17 08/29/2019     No results found for: HGBA1C  No results found for: CHOL  Lab Results   Component Value Date    HDL 41 08/29/2019    HDL 39 (L) 07/26/2018    HDL 49 06/21/2016     Lab Results   Component Value Date    LDL 96 08/29/2019    LDL 93 07/26/2018     (H) 06/21/2016     Lab Results   Component Value Date    TRIG 97 08/29/2019    TRIG 159 (H) 07/26/2018    TRIG 139 06/21/2016     No results found for: RPR  Lab Results   Component Value Date    TSH 1.690 08/29/2019     No results found for: [id number]    Physical Exam:  GENERAL: NAD, overweight  HEENT: Normocephalic, atraumatic   COR: RRR  Resp: Even and unlabored  Extremities: Equal pulses, no signs of distal embolization. Decreased ROM of right shoulder  Skin: No rashes, lesions or ulcers.  Psychiatric: Normal mood and affect.    Neurological:   MS: AO. Language normal. No neglect. Follows all commands.  CN: II-XII grossly  normal  Motor: Normal strength and tone throughout.  Sensory: Intact to light touch in arms and legs  Station and Gait: Normal gait and station.    Coordination: Normal finger to nose bilaterally    Impression/Plan: Mr. Orta presents for follow-up of new onset seizures he suffered in December 2018 in the setting of new onset A. fib and findings of a left frontal cavernoma.  He has since been maintained on Keppra, currently at 500 mg twice daily with no seizure-like activity and no adverse effects.  We will continue the same.  We will follow-up here in 1 year, sooner if symptoms warrant. Seizure precautions discussed.         Greater than 50% of this 15-minute visit was spent counseling patient regarding diagnosis, review of diagnostics, medication treatment options including purpose, risk, benefits, possible side effects as well as recommended lifestyle modifications and preventative measures.    There are no diagnoses linked to this encounter.    Coding      Dictated using Dragon

## 2020-10-02 RX ORDER — BISOPROLOL FUMARATE 5 MG/1
5 TABLET, FILM COATED ORAL DAILY
Qty: 30 TABLET | Refills: 5 | Status: SHIPPED | OUTPATIENT
Start: 2020-10-02 | End: 2021-03-30

## 2020-11-09 ENCOUNTER — OFFICE VISIT (OUTPATIENT)
Dept: FAMILY MEDICINE CLINIC | Facility: CLINIC | Age: 45
End: 2020-11-09

## 2020-11-09 VITALS
TEMPERATURE: 97.7 F | WEIGHT: 263 LBS | SYSTOLIC BLOOD PRESSURE: 135 MMHG | HEIGHT: 72 IN | HEART RATE: 67 BPM | BODY MASS INDEX: 35.62 KG/M2 | DIASTOLIC BLOOD PRESSURE: 84 MMHG

## 2020-11-09 DIAGNOSIS — R10.9 ABDOMINAL DISCOMFORT: Primary | ICD-10-CM

## 2020-11-09 PROCEDURE — 99213 OFFICE O/P EST LOW 20 MIN: CPT | Performed by: NURSE PRACTITIONER

## 2020-11-09 NOTE — PATIENT INSTRUCTIONS
Discharge instructions    Avoid gassy foods smaller more frequent meals  Smaller portions  Avoid very sugary or high fatty foods    Gas-X simethicone twice daily for couple weeks then discontinue    Increase abdominal pain fever chills nausea vomiting emergency room

## 2020-11-09 NOTE — PROGRESS NOTES
Mild vibration sensation causing more concern than pain with normal exam likely intestinal cramping gas

## 2020-11-19 RX ORDER — LEVETIRACETAM 500 MG/1
TABLET ORAL
Qty: 180 TABLET | Refills: 2 | Status: SHIPPED | OUTPATIENT
Start: 2020-11-19 | End: 2021-08-02

## 2020-11-19 NOTE — TELEPHONE ENCOUNTER
Pt last seen 10/01/2020. Follow up scheduled 10/01/2021.       Quantity 180 for 90 days.      Please review and approve or advise.     Thank you.

## 2020-11-22 NOTE — PROGRESS NOTES
"Subjective   Austin Orta is a 45 y.o. male.     Mild vibration sensation causing more concern than pain with normal exam likely intestinal cramping gas  Over the last couple weeks.  Vague gassy discomfort without fever chills any increased abdominal pain  Sees me mid abdomen somewhat lower  Appetite is good otherwise no diarrhea no bowel or bladder change otherwise no abdominal distention  No dietary changes he just had a colonoscopy normal recently         /84   Pulse 67   Temp 97.7 °F (36.5 °C)   Ht 182.9 cm (72.01\")   Wt 119 kg (263 lb)   BMI 35.66 kg/m²       The following portions of the patient's history were reviewed and updated as appropriate: allergies, current medications, past family history, past medical history, past social history, past surgical history and problem list.    Review of Systems   Constitutional: Negative for fatigue and fever.   HENT: Negative.  Negative for trouble swallowing.    Eyes: Negative.    Respiratory: Negative.  Negative for cough and shortness of breath.    Cardiovascular: Negative for chest pain, palpitations and leg swelling.   Gastrointestinal: Negative.  Negative for abdominal pain.   Genitourinary: Negative.    Musculoskeletal: Negative.    Skin: Negative.    Neurological: Negative.  Negative for dizziness and confusion.   Psychiatric/Behavioral: Negative.        Objective   Physical Exam  Vitals signs reviewed.   Constitutional:       Appearance: He is well-developed.   HENT:      Head: Normocephalic.      Nose: Nose normal.   Eyes:      General: No scleral icterus.     Conjunctiva/sclera: Conjunctivae normal.      Pupils: Pupils are equal, round, and reactive to light.   Neck:      Musculoskeletal: Neck supple.      Thyroid: No thyromegaly.      Vascular: No JVD.   Cardiovascular:      Rate and Rhythm: Normal rate and regular rhythm.      Heart sounds: Normal heart sounds. No murmur. No friction rub. No gallop.    Pulmonary:      Effort: Pulmonary effort " is normal. No respiratory distress.      Breath sounds: Normal breath sounds. No stridor. No wheezing or rales.   Abdominal:      General: Bowel sounds are normal. There is no distension.      Palpations: Abdomen is soft.      Tenderness: There is no abdominal tenderness.      Comments: No hepatosplenomegaly, no ascites,   Musculoskeletal:         General: No tenderness.   Lymphadenopathy:      Cervical: No cervical adenopathy.   Skin:     General: Skin is warm and dry.      Findings: No erythema or rash.   Neurological:      Mental Status: He is alert and oriented to person, place, and time.      Deep Tendon Reflexes: Reflexes are normal and symmetric.   Psychiatric:         Behavior: Behavior normal.         Thought Content: Thought content normal.         Judgment: Judgment normal.           Assessment/Plan   Diagnoses and all orders for this visit:    1. Abdominal discomfort (Primary)  Comments:  Mild vibration sensation causing more concern than pain with normal exam likely intestinal cramping gas          Patient is without any worrisome complaint today he has no pain fever no bowel or bladder change otherwise other than some  Gassy sensation  His colonoscopy is up-to-date no night sweats or unexplained weight loss  No bloody stools    Change his diet up  His symptoms should resolve in a few weeks he can try some Gas-X  Should he have any continued symptoms see gastroenterology any fever abdominal pain other similar complaints emergency room          Patient Instructions   Discharge instructions    Avoid gassy foods smaller more frequent meals  Smaller portions  Avoid very sugary or high fatty foods    Gas-X simethicone twice daily for couple weeks then discontinue    Increase abdominal pain fever chills nausea vomiting emergency room

## 2021-01-29 ENCOUNTER — TELEPHONE (OUTPATIENT)
Dept: NEUROLOGY | Facility: CLINIC | Age: 46
End: 2021-01-29

## 2021-01-29 NOTE — TELEPHONE ENCOUNTER
Provider: RASHID DONALDSON   Caller: NOA WADDELL  Relationship to Patient: SELF    Reason for Call: PT IS ASKING THAT HE HAD 1 DOLORES THAT HAD  IN 2016 FROM SEIZURES AND HE HAD A SEIZURE IN 2018 AND HE HAD ANOTHER DOLORES THAT   OF A SEIZURE TO. HE IS WONDERING DO YOU THING THAT IT MIGHT BE SOMETHING ENVIRONMENTAL THAT IS TRIGGERING THE SEIZURES?     PLEASE CALL HIM BACK [phone]

## 2021-02-02 NOTE — TELEPHONE ENCOUNTER
Pt is referring to his dogs that he lost due to seizures. He wants to make sure something in his environment isn't the cause of him having seizures as well.

## 2021-03-04 ENCOUNTER — OFFICE VISIT (OUTPATIENT)
Dept: FAMILY MEDICINE CLINIC | Facility: CLINIC | Age: 46
End: 2021-03-04

## 2021-03-04 VITALS
SYSTOLIC BLOOD PRESSURE: 121 MMHG | DIASTOLIC BLOOD PRESSURE: 82 MMHG | TEMPERATURE: 97.8 F | WEIGHT: 273 LBS | HEART RATE: 64 BPM | OXYGEN SATURATION: 95 % | HEIGHT: 72 IN | BODY MASS INDEX: 36.98 KG/M2

## 2021-03-04 DIAGNOSIS — E78.2 MIXED HYPERLIPIDEMIA: Primary | ICD-10-CM

## 2021-03-04 DIAGNOSIS — E66.9 OBESITY, CLASS II, BMI 35-39.9, ISOLATED (SEE ACTUAL BMI): ICD-10-CM

## 2021-03-04 DIAGNOSIS — K21.9 GASTROESOPHAGEAL REFLUX DISEASE WITHOUT ESOPHAGITIS: ICD-10-CM

## 2021-03-04 DIAGNOSIS — Z72.820 POOR SLEEP: ICD-10-CM

## 2021-03-04 DIAGNOSIS — R40.0 DAYTIME SOMNOLENCE: ICD-10-CM

## 2021-03-04 DIAGNOSIS — R56.9 SEIZURE (HCC): ICD-10-CM

## 2021-03-04 PROBLEM — I48.0 PAROXYSMAL ATRIAL FIBRILLATION (HCC): Status: RESOLVED | Noted: 2019-04-11 | Resolved: 2021-03-04

## 2021-03-04 PROCEDURE — 99214 OFFICE O/P EST MOD 30 MIN: CPT | Performed by: NURSE PRACTITIONER

## 2021-03-04 RX ORDER — MELOXICAM 15 MG/1
15 TABLET ORAL DAILY
Qty: 90 TABLET | Refills: 2 | Status: SHIPPED | OUTPATIENT
Start: 2021-03-04 | End: 2022-10-01

## 2021-03-04 NOTE — PROGRESS NOTES
"Chief Complaint  Depression    Subjective          Austin Orta presents to [facility]  Pleasant patient here today follow-up essential hypertension blood pressure is nicely controlled 121/82 no chest pain no shortness of breath or other similar complaints cholesterol is good.  Chronic shoulder pain takes meloxicam,  50 mg a day has had no issues with that,  Needs fasting labs  He has been social distancing he has not had Covid     History of proximal A. fib he has been in sinus rhythm since January 2019 after cardioversion has been doing quite well has no chest pain shortness of breath no racing heart  He does take a baby aspirin daily but no need for lifelong anticoagulation.    He does not sleep well has some daytime somnolence does not feel like he gets a good night sleep, has obesity as well BMI greater than 35 has not had a sleep study we will get him set up for this      Depression      Review of Systems   Constitutional: Negative.    HENT: Negative.    Respiratory: Negative.    Cardiovascular: Negative.    Gastrointestinal: Negative.    Musculoskeletal: Negative.    Skin: Negative.    Psychiatric/Behavioral: Negative.      Objective   Vital Signs:   /82   Pulse 64   Temp 97.8 °F (36.6 °C) (Temporal)   Ht 182.9 cm (72.01\")   Wt 124 kg (273 lb)   SpO2 95%   BMI 37.02 kg/m²     Physical Exam  Vitals reviewed.   Constitutional:       Appearance: He is well-developed.   HENT:      Head: Normocephalic.      Nose: Nose normal.   Eyes:      General: No scleral icterus.     Conjunctiva/sclera: Conjunctivae normal.      Pupils: Pupils are equal, round, and reactive to light.   Neck:      Thyroid: No thyromegaly.      Vascular: No JVD.   Cardiovascular:      Rate and Rhythm: Normal rate and regular rhythm.      Heart sounds: Normal heart sounds. No murmur. No friction rub. No gallop.    Pulmonary:      Effort: Pulmonary effort is normal. No respiratory distress.      Breath " sounds: Normal breath sounds. No stridor. No wheezing or rales.   Abdominal:      General: Bowel sounds are normal. There is no distension.      Palpations: Abdomen is soft.      Tenderness: There is no abdominal tenderness.      Comments: No hepatosplenomegaly, no ascites,   Musculoskeletal:         General: No tenderness.      Cervical back: Neck supple.   Lymphadenopathy:      Cervical: No cervical adenopathy.   Skin:     General: Skin is warm and dry.      Findings: No erythema or rash.   Neurological:      Mental Status: He is alert and oriented to person, place, and time.      Deep Tendon Reflexes: Reflexes are normal and symmetric.   Psychiatric:         Behavior: Behavior normal.         Thought Content: Thought content normal.         Judgment: Judgment normal.        Result Review :                 Assessment and Plan    There are no diagnoses linked to this encounter.    Follow Up   No follow-ups on file.  Patient was given instructions and counseling regarding his condition or for health maintenance advice. Please see specific information pulled into the AVS if appropriate.     Patient will continue healthy diet regular exercise he will follow instructions

## 2021-03-23 ENCOUNTER — LAB (OUTPATIENT)
Dept: LAB | Facility: HOSPITAL | Age: 46
End: 2021-03-23

## 2021-03-23 PROCEDURE — 81003 URINALYSIS AUTO W/O SCOPE: CPT | Performed by: NURSE PRACTITIONER

## 2021-03-23 PROCEDURE — 80061 LIPID PANEL: CPT | Performed by: NURSE PRACTITIONER

## 2021-03-23 PROCEDURE — 80053 COMPREHEN METABOLIC PANEL: CPT | Performed by: NURSE PRACTITIONER

## 2021-03-23 PROCEDURE — 84443 ASSAY THYROID STIM HORMONE: CPT | Performed by: NURSE PRACTITIONER

## 2021-03-23 PROCEDURE — 85025 COMPLETE CBC W/AUTO DIFF WBC: CPT | Performed by: NURSE PRACTITIONER

## 2021-03-25 ENCOUNTER — APPOINTMENT (OUTPATIENT)
Dept: SLEEP MEDICINE | Facility: HOSPITAL | Age: 46
End: 2021-03-25

## 2021-03-31 RX ORDER — BISOPROLOL FUMARATE 5 MG/1
TABLET, FILM COATED ORAL
Qty: 30 TABLET | Refills: 3 | Status: SHIPPED | OUTPATIENT
Start: 2021-03-31 | End: 2021-08-03

## 2021-04-02 ENCOUNTER — BULK ORDERING (OUTPATIENT)
Dept: CASE MANAGEMENT | Facility: OTHER | Age: 46
End: 2021-04-02

## 2021-04-02 DIAGNOSIS — Z23 IMMUNIZATION DUE: ICD-10-CM

## 2021-04-30 DIAGNOSIS — F32.A DEPRESSION, UNSPECIFIED DEPRESSION TYPE: ICD-10-CM

## 2021-04-30 RX ORDER — ESCITALOPRAM OXALATE 10 MG/1
TABLET ORAL
Qty: 90 TABLET | Refills: 0 | Status: SHIPPED | OUTPATIENT
Start: 2021-04-30 | End: 2021-08-02

## 2021-04-30 RX ORDER — OMEPRAZOLE 40 MG/1
CAPSULE, DELAYED RELEASE ORAL
Qty: 90 CAPSULE | Refills: 0 | Status: SHIPPED | OUTPATIENT
Start: 2021-04-30 | End: 2021-07-01

## 2021-05-12 RX ORDER — MONTELUKAST SODIUM 4 MG/1
TABLET, CHEWABLE ORAL
Qty: 120 TABLET | Refills: 5 | Status: SHIPPED | OUTPATIENT
Start: 2021-05-12 | End: 2021-11-02

## 2021-07-01 RX ORDER — OMEPRAZOLE 40 MG/1
CAPSULE, DELAYED RELEASE ORAL
Qty: 90 CAPSULE | Refills: 0 | Status: SHIPPED | OUTPATIENT
Start: 2021-07-01 | End: 2021-11-03

## 2021-07-01 RX ORDER — LEVOCETIRIZINE DIHYDROCHLORIDE 5 MG/1
TABLET, FILM COATED ORAL
Qty: 90 TABLET | Refills: 0 | Status: SHIPPED | OUTPATIENT
Start: 2021-07-01 | End: 2021-11-03

## 2021-08-01 DIAGNOSIS — F32.A DEPRESSION, UNSPECIFIED DEPRESSION TYPE: ICD-10-CM

## 2021-08-02 ENCOUNTER — OFFICE VISIT (OUTPATIENT)
Dept: FAMILY MEDICINE CLINIC | Facility: CLINIC | Age: 46
End: 2021-08-02

## 2021-08-02 VITALS
TEMPERATURE: 97.5 F | DIASTOLIC BLOOD PRESSURE: 82 MMHG | OXYGEN SATURATION: 98 % | HEIGHT: 72 IN | HEART RATE: 70 BPM | WEIGHT: 270.2 LBS | BODY MASS INDEX: 36.6 KG/M2 | SYSTOLIC BLOOD PRESSURE: 117 MMHG

## 2021-08-02 DIAGNOSIS — M25.511 ACUTE PAIN OF RIGHT SHOULDER: Primary | ICD-10-CM

## 2021-08-02 PROCEDURE — 99213 OFFICE O/P EST LOW 20 MIN: CPT | Performed by: NURSE PRACTITIONER

## 2021-08-02 RX ORDER — CYCLOBENZAPRINE HCL 10 MG
10 TABLET ORAL NIGHTLY PRN
Qty: 30 TABLET | Refills: 0 | Status: SHIPPED | OUTPATIENT
Start: 2021-08-02

## 2021-08-02 RX ORDER — ESCITALOPRAM OXALATE 10 MG/1
TABLET ORAL
Qty: 90 TABLET | Refills: 0 | Status: SHIPPED | OUTPATIENT
Start: 2021-08-02 | End: 2021-11-02

## 2021-08-02 RX ORDER — LEVETIRACETAM 500 MG/1
TABLET ORAL
Qty: 180 TABLET | Refills: 1 | Status: SHIPPED | OUTPATIENT
Start: 2021-08-02 | End: 2021-10-19

## 2021-08-02 NOTE — PROGRESS NOTES
"Chief Complaint  Shoulder Pain (c/o R shoulder pain, worse this weekend )    Subjective          Austin Orta presents to [facility]  History of Present Illness new patient to me.  He is here for  acute on chronic right shoulder pain that begain Friday and is worsening, took some left over hydrocodone to help him sleep and this was somewhat helpful.  Additionally he is taken some 500 mg Tylenols intermittently which seems a little bit helpful as well.  He is trying to get into sports medicine but they did not have an appointment until next Monday.    He does not think that he can tolerate his symptoms until then.  He is on 15 mg of Mobic chronically for right shoulder pain.  He denies any injuries but does report that he was playing volleyball but was not playing a good team so he was not real active on the court and does not recall anything that cause pain during the game.  Now however he cannot reach his right arm above his head and he is having pain with internal rotation.  Pain is located on superior aspect of his anterior shoulder and has point tenderness at the joint line.    He has no known rotator cuff tears but does have chronic arthritic changes and he thinks some rotator cuff problems.    He has had joint injections in the past and is requesting one to help him resolve his pain today.    Objective   Vital Signs:   /82   Pulse 70   Temp 97.5 °F (36.4 °C)   Ht 182.9 cm (72\")   Wt 123 kg (270 lb 3.2 oz)   SpO2 98%   BMI 36.65 kg/m²     Physical Exam  Vitals and nursing note reviewed.   Constitutional:       General: He is not in acute distress.     Appearance: He is well-developed. He is not ill-appearing or diaphoretic.   HENT:      Head: Normocephalic and atraumatic.   Eyes:      General:         Right eye: No discharge.         Left eye: No discharge.      Conjunctiva/sclera: Conjunctivae normal.   Cardiovascular:      Rate and Rhythm: Normal rate and regular " rhythm.   Pulmonary:      Effort: Pulmonary effort is normal.      Breath sounds: Normal breath sounds.   Abdominal:      General: Bowel sounds are normal.      Palpations: Abdomen is soft.      Tenderness: There is no abdominal tenderness.   Musculoskeletal:         General: No deformity.      Right shoulder: Tenderness present. No swelling or deformity. Decreased range of motion. Normal strength.      Cervical back: Neck supple. No deformity, spasms, tenderness or bony tenderness. No pain with movement. Normal range of motion.      Comments: Gait smooth and steady   Lymphadenopathy:      Cervical: No cervical adenopathy.   Skin:     General: Skin is warm and dry.   Neurological:      General: No focal deficit present.      Mental Status: He is alert and oriented to person, place, and time.   Psychiatric:         Mood and Affect: Mood normal.         Behavior: Behavior normal.        Result Review :                 Assessment and Plan    Diagnoses and all orders for this visit:    1. Acute pain of right shoulder (Primary)    Other orders  -     cyclobenzaprine (FLEXERIL) 10 MG tablet; Take 1 tablet by mouth At Night As Needed (shoulder pain).  Dispense: 30 tablet; Refill: 0    Will have him go ahead and get his appointment scheduled for Monday with sports medicine.  For his more acute pain we discussed use of ice 30 minutes 3 times daily.  He can try topicals such as Salonpas which we discussed.  And we will have him switch from meloxicam to 800 mg of ibuprofen 3 times daily.  Discussed that he can start this tomorrow since he is taking his meloxicam today.  I will give him cyclobenzaprine to see if this helps him to sleep and relieves a little bit of the pain.  Suspect rotator cuff tendinopathy.  We discussed signs and symptoms that would require follow-up prior to sports medicine evaluation.  He should avoid sports or other activities using his shoulder until he is evaluated by sports medicine.        Follow Up    Return if symptoms worsen or fail to improve.  Patient was given instructions and counseling regarding his condition or for health maintenance advice. Please see specific information pulled into the AVS if appropriate.

## 2021-08-03 RX ORDER — BISOPROLOL FUMARATE 5 MG/1
TABLET, FILM COATED ORAL
Qty: 30 TABLET | Refills: 3 | Status: SHIPPED | OUTPATIENT
Start: 2021-08-03 | End: 2021-12-06 | Stop reason: SDUPTHER

## 2021-10-07 ENCOUNTER — OFFICE VISIT (OUTPATIENT)
Dept: NEUROLOGY | Facility: CLINIC | Age: 46
End: 2021-10-07

## 2021-10-07 ENCOUNTER — LAB (OUTPATIENT)
Dept: LAB | Facility: HOSPITAL | Age: 46
End: 2021-10-07

## 2021-10-07 VITALS
HEIGHT: 72 IN | HEART RATE: 71 BPM | DIASTOLIC BLOOD PRESSURE: 76 MMHG | WEIGHT: 267 LBS | BODY MASS INDEX: 36.16 KG/M2 | RESPIRATION RATE: 16 BRPM | SYSTOLIC BLOOD PRESSURE: 130 MMHG | OXYGEN SATURATION: 95 %

## 2021-10-07 DIAGNOSIS — Q28.3 CAVERNOUS MALFORMATION: ICD-10-CM

## 2021-10-07 DIAGNOSIS — R56.9 SEIZURE (HCC): ICD-10-CM

## 2021-10-07 DIAGNOSIS — R56.9 SEIZURE (HCC): Primary | ICD-10-CM

## 2021-10-07 PROCEDURE — 80177 DRUG SCRN QUAN LEVETIRACETAM: CPT

## 2021-10-07 PROCEDURE — 36415 COLL VENOUS BLD VENIPUNCTURE: CPT

## 2021-10-07 PROCEDURE — 99213 OFFICE O/P EST LOW 20 MIN: CPT | Performed by: NURSE PRACTITIONER

## 2021-10-07 RX ORDER — TESTOSTERONE 20.25 MG/1.25G
GEL TOPICAL
COMMUNITY
Start: 2021-08-18

## 2021-10-07 RX ORDER — DICLOFENAC SODIUM 75 MG/1
75 TABLET, DELAYED RELEASE ORAL 2 TIMES DAILY WITH MEALS
COMMUNITY
Start: 2021-08-03 | End: 2022-07-28

## 2021-10-07 RX ORDER — TIZANIDINE 4 MG/1
4 TABLET ORAL EVERY 8 HOURS PRN
COMMUNITY
Start: 2021-08-03 | End: 2022-07-28

## 2021-10-07 NOTE — PROGRESS NOTES
DOS: 10/7/2021  NAME: Austin Orta   : 1975  PCP: Epley, James, APRN    Chief Complaint   Patient presents with   • Seizures      SUBJECTIVE  Neurological Problem:  46 y.o. RHW male with seizures, cavernous angioma and venous anomaly and Afib s/p cardioversion and h/o kidney stones who presents today for seizure follow-up.  He is unaccompanied.    Interval History:   Mr. Orta presented to [facility] on 19 with new onset seizures. He was lying on the couch with spouse, fell asleep watching a movie but the dog barking woke her up she witnessed him having generalized shaking and AMS for a couple of minutes.  Per EMS, patient was found to be in atrial fibrillation and had a second seizure while in route to the ED.  CT head showed a left frontal mass with follow-up MRI showed a very small cavernous malformation in the medial left subfrontal region.  He was evaluated by neurosurgery and had follow-up scans confirming left parasagittal frontal lobe cavernous malformation and small left frontal adjacent venous angioma. He was treated with Keppra and discharged on 500 mg BID. His keppra level was subtherapeutic at 8.6 and dose was later titrated to 750 mg BID; however, due to some mood complaints he was tapered back to 500 mg twice daily. He was last seen by me in Oct 2020 doing well on Keppra 500 mg BID dose without any recurrent events.     He presents  today, continues on Keppra 500 mg twice daily.  He denies any adverse effects.  He does report having an episode about a week ago when he woke up, states both legs were aching, hurt.  He denies any biting of tongue, blood on his pillow, incontinence.  His spouse was unaware of any nighttime activity and apparently his dogs did not notice anything of the ordinary.  He denies any other seizure-like episodes.  He states he is very compliant with his medication, takes religiously twice daily.  He had an injury to his right shoulder, is currently getting this worked up.   He also mentions that he seems to be sweating more easily and more profusely.  Is unsure if this is related to any medications.   Review of labs from March 2021 shows a CMP with elevated glucose at 104, creatinine 0.6, otherwise unremarkable; TSH 2.06; lipid panel with a total of 163, HDL 42, LDL 94, ; WBCs slightly elevated at 12.05.  He denies any other changes in his health since his last visit.  He did not get a loop recorder placed but continues to follow-up with cardiology, no evidence of A. Fib since his cardioversion.  He does not smoke.  No alcohol use.  No changes in gait, no falls.    Review of Systems:Review of Systems   Constitutional: Positive for fatigue. Negative for activity change and appetite change.   HENT: Negative for ear pain, tinnitus and trouble swallowing.    Eyes: Negative for photophobia, pain and visual disturbance.   Musculoskeletal: Positive for back pain. Negative for gait problem and neck pain.   Neurological: Negative for dizziness, tremors, seizures, syncope, facial asymmetry, speech difficulty, weakness, light-headedness, numbness and headaches.   Psychiatric/Behavioral: Positive for agitation. Negative for behavioral problems, confusion, decreased concentration, dysphoric mood, hallucinations, self-injury, sleep disturbance and suicidal ideas. The patient is not nervous/anxious and is not hyperactive.     Above ROS reviewed    The following portions of the patient's history were reviewed and updated as appropriate: allergies, current medications, past family history, past medical history, past social history, past surgical history and problem list.    Current Medications:   Current Outpatient Medications:   •  acetaminophen (TYLENOL) 500 MG tablet, Take 500 mg by mouth Every 6 (Six) Hours As Needed for Mild Pain ., Disp: , Rfl:   •  aspirin (ASPIRIN 81) 81 MG chewable tablet, Chew 81 mg Daily., Disp: , Rfl:   •  bisoprolol (ZEBeta) 5 MG tablet, Take 1 tablet by mouth once  daily, Disp: 30 tablet, Rfl: 3  •  colestipol (COLESTID) 1 g tablet, Take 1 tablet twice daily x7 days, then may increase up to 2 tablets twice daily if needed for diarrhea, Disp: 120 tablet, Rfl: 5  •  cyclobenzaprine (FLEXERIL) 10 MG tablet, Take 1 tablet by mouth At Night As Needed (shoulder pain)., Disp: 30 tablet, Rfl: 0  •  diclofenac (VOLTAREN) 75 MG EC tablet, Take 75 mg by mouth 2 (Two) Times a Day With Meals., Disp: , Rfl:   •  escitalopram (LEXAPRO) 10 MG tablet, Take 1 tablet by mouth once daily, Disp: 90 tablet, Rfl: 0  •  fluticasone (FLONASE SENSIMIST) 27.5 MCG/SPRAY nasal spray, 2 sprays into each nostril Daily., Disp: , Rfl:   •  levETIRAcetam (KEPPRA) 500 MG tablet, Take 1 tablet by mouth twice daily, Disp: 180 tablet, Rfl: 1  •  levocetirizine (XYZAL) 5 MG tablet, TAKE 1 TABLET BY MOUTH ONCE DAILY IN THE EVENING, Disp: 90 tablet, Rfl: 0  •  meloxicam (MOBIC) 15 MG tablet, Take 1 tablet by mouth Daily., Disp: 90 tablet, Rfl: 2  •  omeprazole (priLOSEC) 40 MG capsule, Take 1 capsule by mouth once daily, Disp: 90 capsule, Rfl: 0  •  sildenafil (Viagra) 100 MG tablet, Take 1 tablet by mouth Daily As Needed for Erectile Dysfunction., Disp: 30 tablet, Rfl: 5  •  Testosterone 1.62 % gel, APPLY 3 PUMPS TO THE SKIN AS DIRECTED TOPICALLY ONCE DAILY, Disp: , Rfl:   •  tiZANidine (ZANAFLEX) 4 MG tablet, Take 4 mg by mouth Every 8 (Eight) Hours As Needed., Disp: , Rfl:   **I did not stop or change the above medications.  Patient's medication list was updated to reflect medications they have reported as currently taking, including medication changes made by other providers.    OBJECTIVE  Vitals:    10/07/21 1453   BP: 130/76   Pulse: 71   Resp: 16   SpO2: 95%     Body mass index is 36.21 kg/m².    Diagnostics:    Laboratory Results:         Lab Results   Component Value Date    WBC 12.05 (H) 03/23/2021    HGB 15.0 03/23/2021    HCT 43.0 03/23/2021    MCV 85.8 03/23/2021     03/23/2021     Lab Results    Component Value Date    GLUCOSE 104 (H) 03/23/2021    BUN 15 03/23/2021    CREATININE 0.60 (L) 03/23/2021    EGFRIFNONA 144 03/23/2021    EGFRIFAFRI 139 08/29/2019    BCR 25.0 03/23/2021    K 4.5 03/23/2021    CO2 26.6 03/23/2021    CALCIUM 9.4 03/23/2021    PROTENTOTREF 7.1 08/29/2019    ALBUMIN 4.50 03/23/2021    LABIL2 2.4 08/29/2019    AST 24 03/23/2021    ALT 31 03/23/2021     No results found for: HGBA1C  Lab Results   Component Value Date    CHOL 163 03/23/2021     Lab Results   Component Value Date    HDL 42 03/23/2021    HDL 41 08/29/2019    HDL 39 (L) 07/26/2018     Lab Results   Component Value Date    LDL 94 03/23/2021    LDL 96 08/29/2019    LDL 93 07/26/2018     Lab Results   Component Value Date    TRIG 157 (H) 03/23/2021    TRIG 97 08/29/2019    TRIG 159 (H) 07/26/2018     No results found for: RPR  Lab Results   Component Value Date    TSH 2.060 03/23/2021     No results found for: [id number]      Physical Exam:  GENERAL: NAD, overweight  HEENT: Normocephalic, atraumatic   COR: RRR  Resp: Even and unlabored  Extremities:Decreased ROM of right shoulder  Skin: No rashes, lesions or ulcers.  Psychiatric: Normal mood and affect.    Neurological:   MS: AO. Language normal. No neglect. Follows all commands.  CN: II-XII grossly normal  Motor: Normal strength and tone throughout.  Sensory: Intact to light touch in arms and legs  Station and Gait: Normal gait and station.    Coordination: Normal finger to nose bilaterally    Impression/Plan: Mr. Orta presents today for follow-up of new onset seizures he suffered in December 2018 in the setting of new onset A. fib and findings of a left frontal cavernoma.  Since been maintained on Keppra 500 mg twice daily, question of whether he had a an episode about a week prior when he woke with some sore legs, otherwise no signs/symptoms of seizure-like activity.  We will check a Keppra level today.  He is compliant with medications.  He will follow-up here in 1  year, sooner if symptoms warrant.  Seizure precautions discussed.      Diagnoses and all orders for this visit:    1. Seizure (HCC) (Primary)  -     Levetiracetam Level (Keppra); Future    2. Cavernous malformation        Coding      Dictated using Dragon

## 2021-10-12 LAB — LEVETIRACETAM SERPL-MCNC: 9.5 UG/ML (ref 10–40)

## 2021-10-19 RX ORDER — LEVETIRACETAM 500 MG/1
TABLET ORAL
Qty: 270 TABLET | Refills: 3 | Status: SHIPPED | OUTPATIENT
Start: 2021-10-19 | End: 2022-11-02

## 2021-11-02 DIAGNOSIS — F32.A DEPRESSION, UNSPECIFIED DEPRESSION TYPE: ICD-10-CM

## 2021-11-02 RX ORDER — MONTELUKAST SODIUM 4 MG/1
TABLET, CHEWABLE ORAL
Qty: 120 TABLET | Refills: 0 | Status: SHIPPED | OUTPATIENT
Start: 2021-11-02 | End: 2022-03-01

## 2021-11-02 RX ORDER — ESCITALOPRAM OXALATE 10 MG/1
TABLET ORAL
Qty: 90 TABLET | Refills: 0 | Status: SHIPPED | OUTPATIENT
Start: 2021-11-02 | End: 2021-12-21

## 2021-11-02 NOTE — TELEPHONE ENCOUNTER
Rx Refill Note  Requested Prescriptions     Pending Prescriptions Disp Refills   • escitalopram (LEXAPRO) 10 MG tablet [Pharmacy Med Name: Escitalopram Oxalate 10 MG Oral Tablet] 90 tablet 0     Sig: Take 1 tablet by mouth once daily      Last office visit with prescribing clinician: 3/4/2021      Next office visit with prescribing clinician: Visit date not found            Luis Vargas Rep  11/02/21, 16:37 EDT

## 2021-11-03 RX ORDER — LEVOCETIRIZINE DIHYDROCHLORIDE 5 MG/1
TABLET, FILM COATED ORAL
Qty: 90 TABLET | Refills: 3 | Status: SHIPPED | OUTPATIENT
Start: 2021-11-03 | End: 2022-12-30

## 2021-11-03 RX ORDER — OMEPRAZOLE 40 MG/1
CAPSULE, DELAYED RELEASE ORAL
Qty: 90 CAPSULE | Refills: 3 | Status: SHIPPED | OUTPATIENT
Start: 2021-11-03 | End: 2022-07-28 | Stop reason: SDUPTHER

## 2021-11-30 RX ORDER — BISOPROLOL FUMARATE 5 MG/1
TABLET, FILM COATED ORAL
Qty: 90 TABLET | Refills: 0 | OUTPATIENT
Start: 2021-11-30

## 2021-12-03 RX ORDER — BISOPROLOL FUMARATE 5 MG/1
TABLET, FILM COATED ORAL
Qty: 90 TABLET | Refills: 0 | OUTPATIENT
Start: 2021-12-03

## 2021-12-06 NOTE — TELEPHONE ENCOUNTER
Rx Refill Note  Requested Prescriptions     Pending Prescriptions Disp Refills   • bisoprolol (ZEBeta) 5 MG tablet 30 tablet 0     Sig: Take 1 tablet by mouth Daily.      Last office visit with prescribing clinician: 9/28/2020      Next office visit with prescribing clinician: Visit date not found            Awilda العلي [facility]  12/06/21, 12:24 EST

## 2021-12-07 ENCOUNTER — OFFICE VISIT (OUTPATIENT)
Dept: CARDIOLOGY | Facility: CLINIC | Age: 46
End: 2021-12-07

## 2021-12-07 VITALS
BODY MASS INDEX: 36.03 KG/M2 | DIASTOLIC BLOOD PRESSURE: 97 MMHG | SYSTOLIC BLOOD PRESSURE: 148 MMHG | HEIGHT: 72 IN | HEART RATE: 88 BPM | WEIGHT: 266 LBS

## 2021-12-07 DIAGNOSIS — I10 PRIMARY HYPERTENSION: Primary | ICD-10-CM

## 2021-12-07 DIAGNOSIS — I48.0 PAROXYSMAL ATRIAL FIBRILLATION (HCC): ICD-10-CM

## 2021-12-07 DIAGNOSIS — E78.2 MIXED HYPERLIPIDEMIA: ICD-10-CM

## 2021-12-07 PROCEDURE — 93000 ELECTROCARDIOGRAM COMPLETE: CPT

## 2021-12-07 PROCEDURE — 99213 OFFICE O/P EST LOW 20 MIN: CPT

## 2021-12-07 RX ORDER — BISOPROLOL FUMARATE 5 MG/1
5 TABLET, FILM COATED ORAL DAILY
Qty: 30 TABLET | Refills: 0 | Status: SHIPPED | OUTPATIENT
Start: 2021-12-07 | End: 2021-12-07 | Stop reason: SDUPTHER

## 2021-12-07 RX ORDER — BISOPROLOL FUMARATE 5 MG/1
5 TABLET, FILM COATED ORAL DAILY
Qty: 90 TABLET | Refills: 3 | Status: SHIPPED | OUTPATIENT
Start: 2021-12-07 | End: 2022-10-26

## 2021-12-07 NOTE — PROGRESS NOTES
Subjective:        Austin Orta is a 46 y.o. male who here for follow up    Chief Complaint   Patient presents with   • Hospital Follow Up Visit     1YR    Paroxysmal atrial fibrillation    HPI    This is a 46-year-old male who is new to me.  He follows up in office today for management of atrial fibrillation.  Current medical diagnoses also include hyperlipidemia, GERD.  Echo 12/10/2018 EF 60%, normal LV function.  Stress test 12/11/2018 myocardial perfusion indicated a normal study with no evidence of ischemia.  Holter monitor in 2019 was a normal monitor study. He had a successful cardioversion in 2019.    Today in office he denies any chest pain or shortness of breath.    The following portions of the patient's history were reviewed and updated as appropriate: allergies, current medications, past family history, past medical history, past social history, past surgical history and problem list.    Past Medical History:   Diagnosis Date   • Allergic     seasonal   • Anxiety    • Anxiety and depression    • Cavernous malformation    • Depression    • Frozen shoulder    • GERD (gastroesophageal reflux disease)    • History of prostatitis    • Injury of right heel 07/27/2018   • Insomnia    • Joint pain    • Kidney stones 2017 1997, 2010 also   • Left rotator cuff tear    • Paroxysmal atrial fibrillation (HCC) 4/11/2019    Added automatically from request for surgery [id number]   • Prostatitis    • Right rotator cuff tear    • Venous angioma of brain (HCC)          reports that he has never smoked. He has never used smokeless tobacco. He reports that he does not drink alcohol and does not use drugs.     Family History   Problem Relation Age of Onset   • Cancer Mother    • Hypertension Mother    • Nephrolithiasis Sister    • Heart attack Maternal Grandmother    • Cancer Maternal Grandmother    • Skin cancer Father    • Heart attack Maternal Grandfather    • Heart disease Maternal Grandfather    • Dementia Paternal  Grandmother    • Alzheimer's disease Paternal Grandmother    • Skin cancer Paternal Grandfather        ROS     Review of Systems  Constitutional: No wt loss, fever, fatigue  Gastrointestinal: No nausea, abdominal pain  Behavioral/Psych: No insomnia or anxiety  Cardiovascular no chest pain or shortness of breath      Objective:           Vitals and nursing note reviewed.   Constitutional:       Appearance: Well-developed.   HENT:      Head: Normocephalic.      Right Ear: External ear normal.      Left Ear: External ear normal.   Neck:      Vascular: No JVD.   Pulmonary:      Effort: Pulmonary effort is normal. No respiratory distress.      Breath sounds: Normal breath sounds. No stridor. No rales.   Cardiovascular:      Normal rate. Regular rhythm.      No gallop.   Pulses:     Intact distal pulses.   Abdominal:      General: Bowel sounds are normal. There is no distension.      Palpations: Abdomen is soft.      Tenderness: There is no abdominal tenderness. There is no guarding.   Musculoskeletal: Normal range of motion.         General: No tenderness.      Cervical back: Normal range of motion. Skin:     General: Skin is warm.   Neurological:      Mental Status: Alert and oriented to person, place, and time.      Deep Tendon Reflexes: Reflexes are normal and symmetric.   Psychiatric:         Judgment: Judgment normal.           ECG 12 Lead    Date/Time: 12/7/2021 2:59 PM  Performed by: Awilda Mooney APRN  Authorized by: Awilda Mooney APRN   Comparison: compared with previous ECG from 9/28/2020  Similar to previous ECG  Rhythm: sinus rhythm  Rate: normal  BPM: 83  Other findings: non-specific ST-T wave changes    Clinical impression: non-specific ECG                  Current Outpatient Medications:   •  acetaminophen (TYLENOL) 500 MG tablet, Take 500 mg by mouth Every 6 (Six) Hours As Needed for Mild Pain ., Disp: , Rfl:   •  aspirin (ASPIRIN 81) 81 MG chewable tablet, Chew 81 mg Daily., Disp: , Rfl:   •   bisoprolol (ZEBeta) 5 MG tablet, Take 1 tablet by mouth Daily., Disp: 30 tablet, Rfl: 0  •  colestipol (COLESTID) 1 g tablet, TAKE ONE TABLET BY MOUTH TWICE DAILY X7 DAYS THEN MAY INCREASE UP TO 2 TABLETS TWICE DAILY IF NEEDED FOR DIARRHEA, Disp: 120 tablet, Rfl: 0  •  cyclobenzaprine (FLEXERIL) 10 MG tablet, Take 1 tablet by mouth At Night As Needed (shoulder pain)., Disp: 30 tablet, Rfl: 0  •  diclofenac (VOLTAREN) 75 MG EC tablet, Take 75 mg by mouth 2 (Two) Times a Day With Meals., Disp: , Rfl:   •  escitalopram (LEXAPRO) 10 MG tablet, Take 1 tablet by mouth once daily, Disp: 90 tablet, Rfl: 0  •  fluticasone (FLONASE SENSIMIST) 27.5 MCG/SPRAY nasal spray, 2 sprays into each nostril Daily., Disp: , Rfl:   •  levETIRAcetam (KEPPRA) 500 MG tablet, Take one tablet by mouth in AM; Take two tablets by mouth in PM, Disp: 270 tablet, Rfl: 3  •  levocetirizine (XYZAL) 5 MG tablet, TAKE 1 TABLET BY MOUTH ONCE DAILY IN THE EVENING, Disp: 90 tablet, Rfl: 3  •  omeprazole (priLOSEC) 40 MG capsule, Take 1 capsule by mouth once daily, Disp: 90 capsule, Rfl: 3  •  Testosterone 1.62 % gel, APPLY 3 PUMPS TO THE SKIN AS DIRECTED TOPICALLY ONCE DAILY, Disp: , Rfl:   •  tiZANidine (ZANAFLEX) 4 MG tablet, Take 4 mg by mouth Every 8 (Eight) Hours As Needed., Disp: , Rfl:   •  meloxicam (MOBIC) 15 MG tablet, Take 1 tablet by mouth Daily., Disp: 90 tablet, Rfl: 2  •  sildenafil (Viagra) 100 MG tablet, Take 1 tablet by mouth Daily As Needed for Erectile Dysfunction., Disp: 30 tablet, Rfl: 5     Assessment:        Patient Active Problem List   Diagnosis   • Atopic rhinitis   • Biliary dyskinesia   • Diarrhea   • Erectile dysfunction of nonorganic origin   • Gastroesophageal reflux disease   • Insomnia   • Prostatitis   • Chronic fatigue   • Seasonal allergies   • Right shoulder pain   • Hyperlipidemia   • Non morbid obesity due to excess calories   • Eustachian tube dysfunction   • Rotator cuff arthropathy   • Viral syndrome   • Pain of  both hip joints   • Abdominal pain   • Acute bilateral low back pain without sciatica   • Strain of Achilles tendon, initial encounter   • Depression   • Seizure (HCC)   • Atrial fibrillation (HCC)   • Cavernous malformation   • Anticoagulated   • Venous angioma of brain (HCC)     CHADS-VASc Risk Assessment            0 Total Score        Criteria that do not apply:    CHF    Hypertension    Age >/= 75    DM    PRIOR STROKE/TIA/THROMBO    Vascular Disease    Age 65-74    Sex: Female                  Plan:   1. Hypertension: Today in office his BP is slightly elevated.  He reports he has been out of Bisoprolol for 2 days.  Refill bisoprolol.    2. Paroxysmal Afib: ECG sinus sinus rhythm.  Controlled on beta-blockade.    3. Hyperlipidemia: Lipid panel 3/23/21 , HDL 42, LDL 94, trig 157, AST/ALT WNL.  He reports that his labs and cholesterol are managed by Dr Epley             No diagnosis found.    There are no diagnoses linked to this encounter.    COUNSELING:peter Griffiths was given to patient for the following topics: diagnostic results, risk factor reductions, impressions, risks and benefits of treatment options and importance of treatment compliance .       SMOKING COUNSELING:denies    Follow up in 1 year or sooner if needed.    Sincerely,   RASHID Cummins  [facility]  12/07/21  14:59 EST    EMR Dragon/Transcription disclaimer:   Much of this encounter note is an electronic transcription/translation of spoken language to printed text. The electronic translation of spoken language may permit erroneous, or at times, nonsensical words or phrases to be inadvertently transcribed; Although I have reviewed the note for such errors, some may still exist.

## 2021-12-20 DIAGNOSIS — F32.A DEPRESSION, UNSPECIFIED DEPRESSION TYPE: ICD-10-CM

## 2021-12-21 RX ORDER — ESCITALOPRAM OXALATE 10 MG/1
TABLET ORAL
Qty: 90 TABLET | Refills: 0 | Status: SHIPPED | OUTPATIENT
Start: 2021-12-21 | End: 2022-05-04

## 2021-12-21 NOTE — TELEPHONE ENCOUNTER
DID NOT PASS PROTOCOLS    Rx Refill Note  Requested Prescriptions     Pending Prescriptions Disp Refills   • escitalopram (LEXAPRO) 10 MG tablet [Pharmacy Med Name: Escitalopram Oxalate 10 MG Oral Tablet] 90 tablet 0     Sig: Take 1 tablet by mouth once daily      Last office visit with prescribing clinician: 3/4/2021      Next office visit with prescribing clinician: Visit date not found            Alexandrea Paulino MA  12/21/21, 11:57 EST

## 2022-03-01 RX ORDER — MONTELUKAST SODIUM 4 MG/1
TABLET, CHEWABLE ORAL
Qty: 120 TABLET | Refills: 0 | Status: SHIPPED | OUTPATIENT
Start: 2022-03-01 | End: 2022-09-13 | Stop reason: SDUPTHER

## 2022-03-14 ENCOUNTER — TELEPHONE (OUTPATIENT)
Dept: GASTROENTEROLOGY | Facility: CLINIC | Age: 47
End: 2022-03-14

## 2022-03-14 NOTE — TELEPHONE ENCOUNTER
----- Message from RASHID Camp sent at 3/1/2022  7:12 AM EST -----  I just approved the refill for his colestipol for a 1 to 2-month supply.  This patient has not been seen since June 2020.  We need to see him annually for reassessment of symptoms and medication refills.  Could you please give him a call and schedule him for an appointment?  It can be either virtual, telephone, or in person.  Thank you.  RASHID Lipscomb

## 2022-05-04 DIAGNOSIS — F32.A DEPRESSION, UNSPECIFIED DEPRESSION TYPE: ICD-10-CM

## 2022-05-04 RX ORDER — ESCITALOPRAM OXALATE 10 MG/1
TABLET ORAL
Qty: 30 TABLET | Refills: 1 | Status: SHIPPED | OUTPATIENT
Start: 2022-05-04 | End: 2022-07-13

## 2022-07-13 DIAGNOSIS — F32.A DEPRESSION, UNSPECIFIED DEPRESSION TYPE: ICD-10-CM

## 2022-07-13 RX ORDER — ESCITALOPRAM OXALATE 10 MG/1
TABLET ORAL
Qty: 30 TABLET | Refills: 1 | Status: SHIPPED | OUTPATIENT
Start: 2022-07-13 | End: 2022-07-28 | Stop reason: SDUPTHER

## 2022-07-13 NOTE — TELEPHONE ENCOUNTER
Please call patient and get him on the schedule  With some fasting lab doing well its been over a year since I have seen him thank you

## 2022-07-22 NOTE — TELEPHONE ENCOUNTER
Called pt and left VM w/[employer] assist to schedule appt for med refill  Ok hub to read and schedule.  Please advise

## 2022-07-28 ENCOUNTER — OFFICE VISIT (OUTPATIENT)
Dept: FAMILY MEDICINE CLINIC | Facility: CLINIC | Age: 47
End: 2022-07-28

## 2022-07-28 VITALS
WEIGHT: 260 LBS | RESPIRATION RATE: 17 BRPM | HEIGHT: 72 IN | SYSTOLIC BLOOD PRESSURE: 118 MMHG | TEMPERATURE: 97.5 F | DIASTOLIC BLOOD PRESSURE: 84 MMHG | OXYGEN SATURATION: 98 % | BODY MASS INDEX: 35.21 KG/M2 | HEART RATE: 72 BPM

## 2022-07-28 DIAGNOSIS — R73.9 HYPERGLYCEMIA: ICD-10-CM

## 2022-07-28 DIAGNOSIS — Z00.00 HEALTH MAINTENANCE EXAMINATION: ICD-10-CM

## 2022-07-28 DIAGNOSIS — E78.2 MIXED HYPERLIPIDEMIA: ICD-10-CM

## 2022-07-28 DIAGNOSIS — F41.9 ANXIETY: Primary | ICD-10-CM

## 2022-07-28 DIAGNOSIS — F32.A DEPRESSION, UNSPECIFIED DEPRESSION TYPE: ICD-10-CM

## 2022-07-28 PROCEDURE — 99213 OFFICE O/P EST LOW 20 MIN: CPT | Performed by: NURSE PRACTITIONER

## 2022-07-28 RX ORDER — ESCITALOPRAM OXALATE 10 MG/1
10 TABLET ORAL DAILY
Qty: 90 TABLET | Refills: 1 | Status: SHIPPED | OUTPATIENT
Start: 2022-07-28 | End: 2023-03-23

## 2022-07-28 RX ORDER — OMEPRAZOLE 40 MG/1
40 CAPSULE, DELAYED RELEASE ORAL DAILY
Qty: 90 CAPSULE | Refills: 3 | Status: SHIPPED | OUTPATIENT
Start: 2022-07-28 | End: 2022-09-13 | Stop reason: SDUPTHER

## 2022-08-25 ENCOUNTER — LAB (OUTPATIENT)
Dept: LAB | Facility: HOSPITAL | Age: 47
End: 2022-08-25

## 2022-08-25 PROCEDURE — 83036 HEMOGLOBIN GLYCOSYLATED A1C: CPT | Performed by: NURSE PRACTITIONER

## 2022-08-25 PROCEDURE — 80053 COMPREHEN METABOLIC PANEL: CPT | Performed by: NURSE PRACTITIONER

## 2022-08-25 PROCEDURE — 84443 ASSAY THYROID STIM HORMONE: CPT | Performed by: NURSE PRACTITIONER

## 2022-08-25 PROCEDURE — 85025 COMPLETE CBC W/AUTO DIFF WBC: CPT | Performed by: NURSE PRACTITIONER

## 2022-08-25 PROCEDURE — 81003 URINALYSIS AUTO W/O SCOPE: CPT | Performed by: NURSE PRACTITIONER

## 2022-08-25 PROCEDURE — 80061 LIPID PANEL: CPT | Performed by: NURSE PRACTITIONER

## 2022-09-12 NOTE — PROGRESS NOTES
"Chief Complaint   Patient presents with   • Follow-up     Diarrhea, GERD           History of Present Illness    Patient is a 45 year old male who presents to the office today for follow up evaluation.  His last in office visit was on 6/26/20 with Lubna MIKE.  He has a history of GERD, hiatal Hernia, cholecystectomy, and diarrhea.    Last EGD and colonoscopy were performed on 6/5/20  By Dr. Ulrich and remarkable for LA grade A esophagitis, fundic gland polyps, and gastritis.  Further assess with biopsies were unremarkable for celiac disease and h. Pylori, however notable for reactive gastropathy.    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  Random biopsies of colon were negative for microscopic colitis.  Recommended patient follow up for further evaluation of mucosal neuroma and next colon cancer screening due in 6/2030.    For GERD he continues to take Omeprazole 40 mg PO once daily at bedtime and describes symptoms as well controlled with strict compliance and avoiding known dietary triggers such as white capsules.  Denies nausea, vomiting, or dysphagia.    For diarrhea, he takes colestipol 1 g prior to lunch and dinner and experiences 1 soft formed stool per day without melena or hematochezia with sensation of complete evacuation of stool.    Denies abdominal pain.    Reports regular use of NSAIDS, however has plans to continue to reduce intake secondary to eating of arthralgia.       Result Review :       Office Visit with Lubna León APRN (06/26/2020)  Outside Facility Service with Macario Ulrich MD (06/05/2020)  [facility] - LABS (06/05/2020)      Vital Signs:   /92   Pulse 65   Temp 97.8 °F (36.6 °C)   Ht 182.9 cm (72\")   Wt 117 kg (257 lb 4.8 oz)   SpO2 95%   BMI 34.90 kg/m²     Body mass index is 34.9 kg/m².     Physical Exam  Vitals reviewed.   Constitutional:       General: " He is not in acute distress.     Appearance: He is well-developed.   HENT:      Head: Normocephalic and atraumatic.   Pulmonary:      Effort: Pulmonary effort is normal. No respiratory distress.   Abdominal:      General: Abdomen is flat. Bowel sounds are normal. There is no distension.      Palpations: Abdomen is soft. There is no mass.      Tenderness: There is no abdominal tenderness. There is no guarding or rebound.      Hernia: No hernia is present.   Skin:     General: Skin is dry.      Coloration: Skin is not pale.   Neurological:      Mental Status: He is alert and oriented to person, place, and time.   Psychiatric:         Thought Content: Thought content normal.           Assessment and Plan    Diagnoses and all orders for this visit:    1. Biliary dyskinesia (Primary)    2. Gastroesophageal reflux disease with esophagitis without hemorrhage  -     omeprazole (priLOSEC) 40 MG capsule; Take 1 capsule by mouth Daily.  Dispense: 90 capsule; Refill: 3    3. Diarrhea due to malabsorption  -     colestipol (COLESTID) 1 g tablet; Take twice a day before meals  Dispense: 180 tablet; Refill: 3           Discussion:    Patient is a pleasant 39-year-old male who presents the office today for annual follow-up.  For postcholecystectomy diarrhea he takes colestipol 1 g and describes symptoms as well controlled.  Additionally, for GERD symptoms are well controlled on 40 mg omeprazole daily regimen.    I have encouraged patient to begin taking omeprazole 40 mg approximately 1 to 2 hours prior to consuming dinner especially with known consumption of triggers such as white capsules to prevent breakthrough symptoms.  He is agreeable to trying this regimen and if no noticeable difference or compliance of daily medication is easier at nighttime will revert back to prior regimen.    Overall he is doing well and we will continue with treatment plan as outlined above with 1 year follow-up around September 2023.  He is agreeable  to contacting our office for any new or worsening GI concerns.  All questions answered and support provided.         Patient Instructions   Begin taking omeprazole 40 mg by mouth approximately 60-90 minutes prior to dinner    Continue taking colestipol as prescribed                   EMR Dragon/Transcription Disclaimer:  This document has been Dictated utilizing Dragon dictation.

## 2022-09-13 ENCOUNTER — OFFICE VISIT (OUTPATIENT)
Dept: GASTROENTEROLOGY | Facility: CLINIC | Age: 47
End: 2022-09-13

## 2022-09-13 VITALS
SYSTOLIC BLOOD PRESSURE: 114 MMHG | HEART RATE: 65 BPM | OXYGEN SATURATION: 95 % | WEIGHT: 257.3 LBS | DIASTOLIC BLOOD PRESSURE: 92 MMHG | TEMPERATURE: 97.8 F | BODY MASS INDEX: 34.85 KG/M2 | HEIGHT: 72 IN

## 2022-09-13 DIAGNOSIS — K90.9 DIARRHEA DUE TO MALABSORPTION: ICD-10-CM

## 2022-09-13 DIAGNOSIS — K21.00 GASTROESOPHAGEAL REFLUX DISEASE WITH ESOPHAGITIS WITHOUT HEMORRHAGE: ICD-10-CM

## 2022-09-13 DIAGNOSIS — K82.8 BILIARY DYSKINESIA: Primary | ICD-10-CM

## 2022-09-13 DIAGNOSIS — R19.7 DIARRHEA DUE TO MALABSORPTION: ICD-10-CM

## 2022-09-13 PROCEDURE — 99214 OFFICE O/P EST MOD 30 MIN: CPT

## 2022-09-13 RX ORDER — TAMSULOSIN HYDROCHLORIDE 0.4 MG/1
1 CAPSULE ORAL DAILY
COMMUNITY
Start: 2022-07-27 | End: 2022-10-01

## 2022-09-13 RX ORDER — MONTELUKAST SODIUM 4 MG/1
TABLET, CHEWABLE ORAL
Qty: 180 TABLET | Refills: 3 | Status: SHIPPED | OUTPATIENT
Start: 2022-09-13

## 2022-09-13 RX ORDER — OMEPRAZOLE 40 MG/1
40 CAPSULE, DELAYED RELEASE ORAL DAILY
Qty: 90 CAPSULE | Refills: 3 | Status: SHIPPED | OUTPATIENT
Start: 2022-09-13

## 2022-09-13 NOTE — PATIENT INSTRUCTIONS
Begin taking omeprazole 40 mg by mouth approximately 60-90 minutes prior to dinner    Continue taking colestipol as prescribed

## 2022-09-15 ENCOUNTER — OFFICE VISIT (OUTPATIENT)
Dept: FAMILY MEDICINE CLINIC | Facility: CLINIC | Age: 47
End: 2022-09-15

## 2022-09-15 VITALS
OXYGEN SATURATION: 95 % | HEIGHT: 72 IN | HEART RATE: 68 BPM | SYSTOLIC BLOOD PRESSURE: 124 MMHG | RESPIRATION RATE: 12 BRPM | WEIGHT: 256.4 LBS | DIASTOLIC BLOOD PRESSURE: 68 MMHG | BODY MASS INDEX: 34.73 KG/M2 | TEMPERATURE: 97.7 F

## 2022-09-15 DIAGNOSIS — E11.9 NEW ONSET TYPE 2 DIABETES MELLITUS: Primary | ICD-10-CM

## 2022-09-15 DIAGNOSIS — E78.5 MILD HYPERLIPIDEMIA: ICD-10-CM

## 2022-09-15 PROCEDURE — 99214 OFFICE O/P EST MOD 30 MIN: CPT | Performed by: NURSE PRACTITIONER

## 2022-09-15 RX ORDER — ATORVASTATIN CALCIUM 20 MG/1
20 TABLET, FILM COATED ORAL DAILY
Qty: 90 TABLET | Refills: 3 | Status: SHIPPED | OUTPATIENT
Start: 2022-09-15 | End: 2022-10-05

## 2022-09-15 RX ORDER — BLOOD-GLUCOSE METER
KIT MISCELLANEOUS
Qty: 1 EACH | Refills: 0 | Status: SHIPPED | OUTPATIENT
Start: 2022-09-15

## 2022-09-15 RX ORDER — LANCETS
EACH MISCELLANEOUS
Qty: 200 EACH | Refills: 12 | Status: SHIPPED | OUTPATIENT
Start: 2022-09-15

## 2022-09-15 NOTE — PROGRESS NOTES
"Chief Complaint  Labs Only (Pt here to discuss lab results)    Subjective        Austin Orta presents to [facility]  History of Present Illness    The patient presents today for follow-up for abnormal labs. Based upon review of previous labs, his glucose levels have been gradually increasing. He reports that he is walking 10,000 steps a day. He states that he has also cut back on his processed sugar intake. He reports that he is taking colestipol. He states that he has also had a cholecystectomy.  A1c increased in the diabetes range she has new onset diabetes mellitus  No increasing fatigue frequent urination unexplained weight loss    Objective   Vital Signs:  /68   Pulse 68   Temp 97.7 °F (36.5 °C) (Infrared)   Resp 12   Ht 182.9 cm (72\")   Wt 116 kg (256 lb 6.4 oz)   SpO2 95%   BMI 34.77 kg/m²   Estimated body mass index is 34.77 kg/m² as calculated from the following:    Height as of this encounter: 182.9 cm (72\").    Weight as of this encounter: 116 kg (256 lb 6.4 oz).          Physical Exam  Vitals reviewed.   Constitutional:       Appearance: He is well-developed.   HENT:      Head: Normocephalic.      Nose: Nose normal.   Eyes:      General: No scleral icterus.     Conjunctiva/sclera: Conjunctivae normal.      Pupils: Pupils are equal, round, and reactive to light.   Neck:      Thyroid: No thyromegaly.      Vascular: No JVD.   Cardiovascular:      Rate and Rhythm: Normal rate and regular rhythm.      Heart sounds: Normal heart sounds. No murmur heard.    No friction rub. No gallop.   Pulmonary:      Effort: Pulmonary effort is normal. No respiratory distress.      Breath sounds: Normal breath sounds. No stridor. No wheezing or rales.   Abdominal:      General: Bowel sounds are normal. There is no distension.      Palpations: Abdomen is soft.      Tenderness: There is no abdominal tenderness.      Comments: No hepatosplenomegaly, no ascites,   Musculoskeletal:    "      General: No tenderness.      Cervical back: Neck supple.   Lymphadenopathy:      Cervical: No cervical adenopathy.   Skin:     General: Skin is warm and dry.      Findings: No erythema or rash.   Neurological:      Mental Status: He is alert and oriented to person, place, and time.      Deep Tendon Reflexes: Reflexes are normal and symmetric.   Psychiatric:         Behavior: Behavior normal.         Thought Content: Thought content normal.         Judgment: Judgment normal.          The patient is alert and oriented. Eyes are PERRLA, clear. Psych pleasant, appropriate. Normal affect.    Result Review :     The patient's A1c was 6.5. His sodium levels were decreased. All other labs were stable.                   Assessment and Plan   Diagnoses and all orders for this visit:    1. New onset type 2 diabetes mellitus (HCC) (Primary)  -     Ambulatory Referral to Diabetic Education    2. Mild hyperlipidemia    Other orders  -     atorvastatin (LIPITOR) 20 MG tablet; Take 1 tablet by mouth Daily. To decrease heart attack stroke risk  Dispense: 90 tablet; Refill: 3  -     glucose monitor monitoring kit; Use as directed. Check blood sugar twice daily. Please prescribe to pt what insurance will cover. DX:E11.9  Dispense: 1 each; Refill: 0  -     glucose blood test strip; Use as instructed. please check blood sugar 2 times a day. Please prescribe to pt what insurance will cover. DX:E11.9  Dispense: 200 each; Refill: 12  -     Lancets (onetouch ultrasoft) lancets; Use as directed. Pleas check blood sugar 2 times daily. Please prescribe to pt what insurance will cover DX:E11.9  Dispense: 200 each; Refill: 12      ASSESSMENT  1. New onset type 2 diabetes mellitus.    PLAN  - The patient was advised to follow the American diabetes Association diet. He was advised to follow a 12 to 18-month plan. The patient was advised that we would like to see slow gradual weight loss with caloric restriction and he was advised to really  focus on reducing the processed sweets intake.   - We discussed the need for him to be on a cholesterol medication, to lower his risk of stroke and heart attack. The patient was advised that the goal is to lower his LDL to less than 70.   - The patient was encouraged to stay hydrated. He was encouraged to drink about 64 ounces of water a day.   - We discussed prescribing the medication Ozempic. We discussed in detail the risks and benefits of taking this medication. The patient would like to hold off on medication for a couple of months while he attempts diet and exercise. He was provided educational  literature regarding Ozempic.   - The patient will be prescribed atorvastatin 20 mg.   - He will be prescribed a glucose monitor to check his glucose levels once daily, as needed. The patient was advised that his fasting morning reading should be less than 100 mg/dL, and his post meal levels should be around 140 mg/dL or less.  - Follow up in 3 months.            Follow Up   No follow-ups on file.  Patient was given instructions and counseling regarding his condition or for health maintenance advice. Please see specific information pulled into the AVS if appropriate.     Transcribed from ambient dictation for James Epley, APRN by Frank Hernandez.  09/15/22   15:19 EDT    Patient verbalized consent to the visit recording.

## 2022-10-01 ENCOUNTER — TELEMEDICINE (OUTPATIENT)
Dept: FAMILY MEDICINE CLINIC | Facility: TELEHEALTH | Age: 47
End: 2022-10-01

## 2022-10-01 DIAGNOSIS — H01.001 BLEPHARITIS OF RIGHT UPPER EYELID, UNSPECIFIED TYPE: Primary | ICD-10-CM

## 2022-10-01 PROBLEM — F41.9 ANXIETY: Status: ACTIVE | Noted: 2022-10-01

## 2022-10-01 PROBLEM — N20.0 CALCULUS OF KIDNEY: Status: ACTIVE | Noted: 2022-10-01

## 2022-10-01 PROCEDURE — 99213 OFFICE O/P EST LOW 20 MIN: CPT | Performed by: NURSE PRACTITIONER

## 2022-10-01 RX ORDER — BLOOD-GLUCOSE METER
EACH MISCELLANEOUS
COMMUNITY
Start: 2022-09-15

## 2022-10-01 RX ORDER — ERYTHROMYCIN 5 MG/G
OINTMENT OPHTHALMIC EVERY 6 HOURS
Qty: 3.5 G | Refills: 0 | Status: SHIPPED | OUTPATIENT
Start: 2022-10-01 | End: 2022-10-08

## 2022-10-01 NOTE — PATIENT INSTRUCTIONS
Warm compresses to remove eye discharge and warm compresses several times per day    Wash hands before and after touching eye and instilling eye ointment   If symptoms do not improve on 5 days or if symptoms worsen anytime follow up.       Blepharitis    Blepharitis is swelling of the eyelids. It can cause the eyes to feel dry or gritty. Other symptoms may include:  Reddish, scaly skin around the scalp and eyebrows.  Eyelids that itch or burn.  Fluid that leaks from the eye at night. This causes the eyelashes to stick together in the morning.  Eyelashes that fall out.  Redness of the eyes.  Eyes that are sensitive to light.  Follow these instructions at home:  Watch for any changes in how your eyes look or feel. Tell your doctor about any changes. Follow these instructions to help with your condition.  Keeping clean  Wash your hands often with soap and water for at least 20 seconds.  Clean your eyes. Wash the edges of your eyelids using eyelid wipes or a small amount of baby shampoo that has been mixed with warm water (diluted). Do this 2 or more times a day.  Wash your face and eyebrows at least once a day.  Use a clean towel each time you dry your eyelids.  Do not use the towel to clean or dry other areas of your body.  Do not share your towel with anyone.  General instructions  Avoid wearing makeup until you get better. Do not share makeup with anyone.  Avoid rubbing your eyes.  Use a warm compress on your eyes for 5-10 minutes at a time. Do this 1 or 2 times a day, or as told by your doctor. You can use:  A towel with warm water on it.  A heating pad that can be warmed in the microwave. The pad should be very warm but not hot enough to burn the skin.  If you were given an antibiotic cream or eye drops, use the medicine as told by your doctor. Do not stop using the medicine even if you feel better.  Keep all follow-up visits.  Contact a doctor if:  Your eyelids feel hot.  You have blisters on your eyelids.  You  have a rash on your eyelids.  The swelling does not go away in 2-4 days.  The swelling gets worse.  Get help right away if:  You have pain that gets worse or spreads to other parts of your face.  You have redness that gets worse or spreads to other parts of your face.  You have changes in how you see (vision).  You have pain when you look at lights or things that move.  You have a fever.  Summary  Blepharitis is swelling of the eyelids.  Watch for any changes in how your eyes look or feel. Tell your doctor about any changes.  Follow home care instructions as told by your doctor. Wash your hands often with soap and water for at least 20 seconds. Avoid wearing makeup. Do not rub your eyes.  Use a warm compress, creams, or eye drops as told by your doctor.  Let your doctor know if you have changes in how you see, blisters or a rash on your eyelids, or other problems.  This information is not intended to replace advice given to you by your health care provider. Make sure you discuss any questions you have with your health care provider.  Document Revised: 01/19/2022 Document Reviewed: 01/19/2022  Elsevier Patient Education © 2022 [employer]

## 2022-10-01 NOTE — PROGRESS NOTES
CHIEF COMPLAINT  Chief Complaint   Patient presents with   • Eye Problem         HPI  Austin Orta is a 47 y.o. male  presents with complaint of swollen and red eyelid just above eyelash on right upper lid that started yesterday. He did use warm moist cloth to the eye before the visit.     Review of Systems   Constitutional: Negative for fever.   Eyes: Positive for discharge (this monring in the corner of eye. ) and redness. Negative for photophobia, pain, itching and visual disturbance.       Past Medical History:   Diagnosis Date   • Allergic     seasonal   • Anxiety    • Anxiety and depression    • Cavernous malformation    • Depression    • Frozen shoulder    • GERD (gastroesophageal reflux disease)    • History of prostatitis    • Injury of right heel 07/27/2018   • Insomnia    • Joint pain    • Kidney stones 2017    1997, 2010 also   • Left rotator cuff tear    • Paroxysmal atrial fibrillation (HCC) 4/11/2019    Added automatically from request for surgery [id number]   • Prostatitis    • Right rotator cuff tear    • Venous angioma of brain (HCC)        Family History   Problem Relation Age of Onset   • Cancer Mother    • Hypertension Mother    • Nephrolithiasis Sister    • Heart attack Maternal Grandmother    • Cancer Maternal Grandmother    • Skin cancer Father    • Heart attack Maternal Grandfather    • Heart disease Maternal Grandfather    • Dementia Paternal Grandmother    • Alzheimer's disease Paternal Grandmother    • Skin cancer Paternal Grandfather        Social History     Socioeconomic History   • Marital status:    Tobacco Use   • Smoking status: Never Smoker   • Smokeless tobacco: Never Used   Substance and Sexual Activity   • Alcohol use: No   • Drug use: No   • Sexual activity: Defer       Austin Orta  reports that he has never smoked. He has never used smokeless tobacco.    There were no vitals taken for this visit.    PHYSICAL EXAM  Physical Exam   Constitutional: He is oriented to  person, place, and time. He appears well-developed and well-nourished. He does not have a sickly appearance. He does not appear ill. No distress.   HENT:   Head: Normocephalic and atraumatic.   Eyes: Pupils are equal, round, and reactive to light. EOM are normal. Right eye exhibits edema. Right eye exhibits no discharge, no exudate and no hordeolum. No foreign body present in the right eye. Left eye exhibits no discharge, no exudate, no hordeolum and no edema. No foreign body present in the left eye. Eyelid: no right ptosis or left ptosis. Right conjunctiva is not injected. Right conjunctiva has no hemorrhage. Left conjunctiva is not injected. Left conjunctiva has no hemorrhage. No scleral icterus.       Neck: Neck normal appearance.  Pulmonary/Chest: Effort normal.  No respiratory distress.  Neurological: He is alert and oriented to person, place, and time.   Skin: Skin is dry.   Psychiatric: He has a normal mood and affect.       Diagnoses and all orders for this visit:    1. Blepharitis of right upper eyelid, unspecified type (Primary)    Other orders  -     erythromycin (ROMYCIN) 5 MG/GM ophthalmic ointment; Administer  to the right eye Every 6 (Six) Hours for 7 days. While awake  Dispense: 3.5 g; Refill: 0        The use of a video visit has been reviewed with the patient and verbal informd consent has een obtained. Myself and Austin Orta participated in this visit. The patient is located in [address] [place] [address]. I am located in [place], [place]. Mychart and Zoom were utilized.    Note Disclaimer: At [facility], we believe that sharing information builds trust and better   relationships. You are receiving this note because you recently visited [facility]. It is possible you   will see health information before a provider has talked with you about it. This kind of information can   be easy to misunderstand. To help you fully understand what it means for your health, we urge  you to   discuss this note with your provider.    Rosalba Florian, RASHID  10/01/2022  11:42 EDT

## 2022-10-05 ENCOUNTER — TELEMEDICINE (OUTPATIENT)
Dept: FAMILY MEDICINE CLINIC | Facility: TELEHEALTH | Age: 47
End: 2022-10-05

## 2022-10-05 ENCOUNTER — TELEPHONE (OUTPATIENT)
Dept: FAMILY MEDICINE CLINIC | Facility: CLINIC | Age: 47
End: 2022-10-05

## 2022-10-05 DIAGNOSIS — U07.1 COVID-19: Primary | ICD-10-CM

## 2022-10-05 PROCEDURE — 99213 OFFICE O/P EST LOW 20 MIN: CPT | Performed by: NURSE PRACTITIONER

## 2022-10-05 NOTE — PROGRESS NOTES
Subjective   Chief Complaint   Patient presents with   • URI     COVID 19       Austin Orta is a 47 y.o. male.     History of Present Illness  Patient reports sore throat, body aches, congestion, headache, ears plugged and chills for 2 days.  He tested positive for COVID last night.  He states he reached out to his PCP and they advised him that he should take Paxlovid due to his health history.  He has been vaccinated +1 booster with no prior history of COVID.  BMI 34.77.  Pertinent history of COVID  A. Fib, prediabetes and obesity.  URI   This is a new problem. Episode onset: 2 days. The problem has been unchanged. There has been no fever. Associated symptoms include congestion, headaches, a plugged ear sensation and a sore throat. Pertinent negatives include no chest pain, coughing or wheezing.        Allergies   Allergen Reactions   • Ciprofloxacin Nausea And Vomiting and Myalgia       Past Medical History:   Diagnosis Date   • Allergic     seasonal   • Anxiety    • Anxiety and depression    • Cavernous malformation    • Depression    • Frozen shoulder    • GERD (gastroesophageal reflux disease)    • History of prostatitis    • Injury of right heel 07/27/2018   • Insomnia    • Joint pain    • Kidney stones 2017 1997, 2010 also   • Left rotator cuff tear    • Paroxysmal atrial fibrillation (HCC) 04/11/2019    Added automatically from request for surgery [id number]   • Pre-diabetes    • Prostatitis    • Right rotator cuff tear    • Venous angioma of brain (HCC)        Past Surgical History:   Procedure Laterality Date   • CHOLECYSTECTOMY     • COLONOSCOPY     • EXTRACORPOREAL SHOCK WAVE LITHOTRIPSY (ESWL) Right 3/24/2017    Procedure: RIGHT EXTRACORPOREAL SHOCKWAVE LITHOTRIPSY WITH CYSTOSCOPY;  Surgeon: Maksim Walker MD;  Location: [facility];  Service:    • INGUINAL HERNIA REPAIR     • KIDNEY STONE SURGERY     • TONSILLECTOMY     • VASECTOMY         Social History     Socioeconomic History   •  Marital status:    Tobacco Use   • Smoking status: Never Smoker   • Smokeless tobacco: Never Used   Substance and Sexual Activity   • Alcohol use: No   • Drug use: No   • Sexual activity: Defer       Family History   Problem Relation Age of Onset   • Cancer Mother    • Hypertension Mother    • Nephrolithiasis Sister    • Heart attack Maternal Grandmother    • Cancer Maternal Grandmother    • Skin cancer Father    • Heart attack Maternal Grandfather    • Heart disease Maternal Grandfather    • Dementia Paternal Grandmother    • Alzheimer's disease Paternal Grandmother    • Skin cancer Paternal Grandfather          Current Outpatient Medications:   •  acetaminophen (TYLENOL) 500 MG tablet, Take 500 mg by mouth Every 6 (Six) Hours As Needed for Mild Pain ., Disp: , Rfl:   •  aspirin 81 MG chewable tablet, Chew 81 mg Daily., Disp: , Rfl:   •  bisoprolol (ZEBeta) 5 MG tablet, Take 1 tablet by mouth Daily., Disp: 90 tablet, Rfl: 3  •  Blood Glucose Monitoring Suppl (ONE TOUCH ULTRA 2) w/Device kit, , Disp: , Rfl:   •  colestipol (COLESTID) 1 g tablet, Take twice a day before meals, Disp: 180 tablet, Rfl: 3  •  cyclobenzaprine (FLEXERIL) 10 MG tablet, Take 1 tablet by mouth At Night As Needed (shoulder pain)., Disp: 30 tablet, Rfl: 0  •  erythromycin (ROMYCIN) 5 MG/GM ophthalmic ointment, Administer  to the right eye Every 6 (Six) Hours for 7 days. While awake, Disp: 3.5 g, Rfl: 0  •  escitalopram (LEXAPRO) 10 MG tablet, Take 1 tablet by mouth Daily., Disp: 90 tablet, Rfl: 1  •  fluticasone (VERAMYST) 27.5 MCG/SPRAY nasal spray, 2 sprays into each nostril Daily., Disp: , Rfl:   •  glucose blood test strip, Use as instructed. please check blood sugar 2 times a day. Please prescribe to pt what insurance will cover. DX:E11.9, Disp: 200 each, Rfl: 12  •  glucose monitor monitoring kit, Use as directed. Check blood sugar twice daily. Please prescribe to pt what insurance will cover. DX:E11.9, Disp: 1 each, Rfl: 0  •   Lancets (onetouch ultrasoft) lancets, Use as directed. Pleas check blood sugar 2 times daily. Please prescribe to pt what insurance will cover DX:E11.9, Disp: 200 each, Rfl: 12  •  levETIRAcetam (KEPPRA) 500 MG tablet, Take one tablet by mouth in AM; Take two tablets by mouth in PM, Disp: 270 tablet, Rfl: 3  •  levocetirizine (XYZAL) 5 MG tablet, TAKE 1 TABLET BY MOUTH ONCE DAILY IN THE EVENING, Disp: 90 tablet, Rfl: 3  •  Nirmatrelvir&Ritonavir 300/100 (PAXLOVID) 20 x 150 MG & 10 x 100MG tablet therapy pack tablet, Take 3 tablets by mouth 2 (Two) Times a Day for 5 days., Disp: 30 tablet, Rfl: 0  •  omeprazole (priLOSEC) 40 MG capsule, Take 1 capsule by mouth Daily., Disp: 90 capsule, Rfl: 3  •  Testosterone 1.62 % gel, APPLY 3 PUMPS TO THE SKIN AS DIRECTED TOPICALLY ONCE DAILY, Disp: , Rfl:       Review of Systems   Constitutional: Positive for chills. Negative for diaphoresis, fatigue and fever.   HENT: Positive for congestion, postnasal drip and sore throat.    Respiratory: Negative for cough, chest tightness, shortness of breath and wheezing.    Cardiovascular: Negative for chest pain and palpitations.   Gastrointestinal: Negative.    Musculoskeletal: Negative for myalgias.   Neurological: Positive for headache.        There were no vitals filed for this visit.    Objective   Physical Exam  Constitutional:       General: He is not in acute distress.     Appearance: Normal appearance. He is not ill-appearing, toxic-appearing or diaphoretic.   HENT:      Head: Normocephalic.      Nose: Congestion present.      Comments: Per pt       Mouth/Throat:      Lips: Pink.      Mouth: Mucous membranes are moist.   Pulmonary:      Effort: Pulmonary effort is normal.   Neurological:      Mental Status: He is alert and oriented to person, place, and time.   Psychiatric:         Mood and Affect: Mood normal.         Behavior: Behavior normal.          Procedures     Assessment & Plan   Diagnoses and all orders for this  visit:    1. COVID-19 (Primary)  -     Nirmatrelvir&Ritonavir 300/100 (PAXLOVID) 20 x 150 MG & 10 x 100MG tablet therapy pack tablet; Take 3 tablets by mouth 2 (Two) Times a Day for 5 days.  Dispense: 30 tablet; Refill: 0      Treat symptoms as you will with any cold or viral illness.  Alternate tylenol and motrin for pain and/or fever, stay hydrated and rest.     Warning signs for COVID-19: trouble breathing, increasing shortness of breath, persistent chest pain or pressure, new confusion, inability to stay awake, pale, gray or blue-colored skin, lips or nail beds. If you show any of these signs seek Emergency Care.     If symptoms worsen or do not improve follow up with your PCP or visit your nearest Urgent Care Center or ER.        COVID QUARANTINE GUIDELINES:    You should Quarantine while you are waiting for your results.     Positive COVID result:  Isolate for 10 days from the date your symptoms began.  If symptoms fully resolve, you may end Isolation after 5 days from the onset of symptoms and wear a well-fitted mask for the additional 5 days.   If you can not wear a well-fitted mask AT ALL TIMES, you must remain in isolation for a full 10 days from the onset of symptoms.     If you have a Positive COVID result and NEVER had symptoms:  Isolate for 5 days from the date you had a positive test.   Wear a well-fitted mask for an additional 5 days.   If you can not wear a well fitted mask AT ALL TIMES, you must remain in isolation for the full 10 days from the onset of symptoms.       If you are NOT fully Vaccinated or had a Booster shot if eligible, but have had COVID EXPOSURE:  Quarantine for 10 days from the last day of exposure  Quarantine may be shortened if you have no symptoms and test Negative on day 5 post exposure.   Wear a well-fitted mask for 10 days from the last day of exposure.  If symptoms develop, stay home and get re-tested.     If HAVE been fully Vaccinated and had a Booster shot if eligible,  but have had COVID EXPOSURE and have No Symptoms:  You do NOT need to quarantine  Wear a well-fitted mask for 10 days from the last day of exposure.  Get a COVID test on day 5.  If symptoms develop, stay home and get re-tested.       PLAN: Discussed dosing, side effects, recommended other symptomatic care.  Patient should follow up with primary care provider, Urgent Care or ER if symptoms worsen, fail to resolve or other symptoms need attention. Patient/family agree to the above.         RASHID Almanza     The use of a video visit has been reviewed with the patient and verbal informed consent has been obtained. Myself and Austin Orta participated in this visit. The patient is located at [address] [place] [address]. I am located in [place], [place]. Mychart and Zoom were utilized.        This visit was performed via Telehealth.  This patient has been instructed to follow-up with their primary care provider if their symptoms worsen or the treatment provided does not resolve their illness.

## 2022-10-05 NOTE — TELEPHONE ENCOUNTER
DELETE     Caller: Austin Orta    Relationship: Self    Best call back number: [phone]       What medication are you requesting: PAXLOVID     What are your current symptoms: CONGESTION, SORE THROAT, HEADACHE, CHILLS, EARS ARE FULL       If a prescription is needed, what is your preferred pharmacy and phone number: [id number] [place], [place] - [address] AT [facility] [address]  - [phone] [facility] [phone] FX     Additional notes: PATIENT HAS TESTED POSITIVE FOR COVID AND WOULD LIKE TO GET THIS MEDICATION CALLED IN

## 2022-10-05 NOTE — PATIENT INSTRUCTIONS
Treat symptoms as you will with any cold or viral illness.  Alternate tylenol and motrin for pain and/or fever, stay hydrated and rest.     Warning signs for COVID-19: trouble breathing, increasing shortness of breath, persistent chest pain or pressure, new confusion, inability to stay awake, pale, gray or blue-colored skin, lips or nail beds. If you show any of these signs seek Emergency Care.     If symptoms worsen or do not improve follow up with your PCP or visit your nearest Urgent Care Center or ER.        COVID QUARANTINE GUIDELINES:    You should Quarantine while you are waiting for your results.     Positive COVID result:  Isolate for 10 days from the date your symptoms began.  If symptoms fully resolve, you may end Isolation after 5 days from the onset of symptoms and wear a well-fitted mask for the additional 5 days.   If you can not wear a well-fitted mask AT ALL TIMES, you must remain in isolation for a full 10 days from the onset of symptoms.     If you have a Positive COVID result and NEVER had symptoms:  Isolate for 5 days from the date you had a positive test.   Wear a well-fitted mask for an additional 5 days.   If you can not wear a well fitted mask AT ALL TIMES, you must remain in isolation for the full 10 days from the onset of symptoms.       If you are NOT fully Vaccinated or had a Booster shot if eligible, but have had COVID EXPOSURE:  Quarantine for 10 days from the last day of exposure  Quarantine may be shortened if you have no symptoms and test Negative on day 5 post exposure.   Wear a well-fitted mask for 10 days from the last day of exposure.  If symptoms develop, stay home and get re-tested.     If HAVE been fully Vaccinated and had a Booster shot if eligible, but have had COVID EXPOSURE and have No Symptoms:  You do NOT need to quarantine  Wear a well-fitted mask for 10 days from the last day of exposure.  Get a COVID test on day 5.  If symptoms develop, stay home and get re-tested.

## 2022-10-05 NOTE — TELEPHONE ENCOUNTER
The office currently has not openings. Patient has been advised to go to  or do a video visit with .     Hub please advise if calls back. Phone cut off while spaeking  and I left a voice with details also. I am not sure if he heard me.

## 2022-10-26 RX ORDER — BISOPROLOL FUMARATE 5 MG/1
TABLET, FILM COATED ORAL
Qty: 90 TABLET | Refills: 0 | Status: SHIPPED | OUTPATIENT
Start: 2022-10-26 | End: 2023-04-05

## 2022-10-27 ENCOUNTER — OFFICE VISIT (OUTPATIENT)
Dept: NEUROLOGY | Facility: CLINIC | Age: 47
End: 2022-10-27

## 2022-10-27 VITALS
OXYGEN SATURATION: 98 % | SYSTOLIC BLOOD PRESSURE: 128 MMHG | BODY MASS INDEX: 33.24 KG/M2 | WEIGHT: 245.4 LBS | HEIGHT: 72 IN | DIASTOLIC BLOOD PRESSURE: 76 MMHG | HEART RATE: 70 BPM

## 2022-10-27 DIAGNOSIS — Q28.3 CAVERNOUS MALFORMATION: ICD-10-CM

## 2022-10-27 DIAGNOSIS — R56.9 SEIZURE: Primary | ICD-10-CM

## 2022-10-27 PROCEDURE — 99214 OFFICE O/P EST MOD 30 MIN: CPT | Performed by: NURSE PRACTITIONER

## 2022-10-27 NOTE — PROGRESS NOTES
DOS: 10/27/2022  NAME: Austin Orta   : 1975  PCP: Epley, James, APRN    Chief Complaint   Patient presents with   • Seizures      SUBJECTIVE  Neurological Problem:  47 y.o. RHW male with seizures, cavernous angioma and venous anomaly, borderline DM, Afib s/p cardioversion and h/o kidney stones who presents today for seizure follow-up.  He is unaccompanied.    Interval History:   Mr. Orta has a h/o seizures which started in 2018 in the context of new onset afib and findings of  left parasagittal frontal lobe cavernous malformation and small left frontal adjacent venous angioma (evaluated by neurosurgery and to follow-up with them prn).  He has since been treated with Keppra with fairly good control.  When last seen in 2021, he did report an event that was suspicious for nocturnal seizure, Keppra level revealed a level of 9.5, his dose was increased to 500 mg a.m., 1000 mg p.m.    Patient presents today, denies any recurrent seizure-like episodes.  Reports good compliance with medications.  He denies any adverse medication side effects.  States his mood has been stable.  He has been recently diagnosed with prediabetes and has started walking lost a significant amount of weight.  In general states he overall feels better; however, he had COVID earlier this month has been suffering with some fatigue as a result of that.  He denies any other changes in his health since his last visit.  He denies any issues with headaches, no vision changes, no weakness, no changes in ambulation, no falls.  He denies any palpitations and to his knowledge no recurrence of A. fib.  Review of most recent lab work from 2022 shows a CMP with a glucose of 124, sodium 131, otherwise WNL; A1c 6.5; TSH 1.92; lipid panel with a total of 173, HDL 39, , ; CBC WNL.  He does not smoke.  No alcohol use.    Review of Systems:Review of Systems   Constitutional: Negative for activity change, appetite change, chills,  diaphoresis, fatigue, fever and unexpected weight change.   HENT: Negative for congestion, dental problem, drooling, ear discharge, ear pain, facial swelling, hearing loss, mouth sores, nosebleeds, postnasal drip, rhinorrhea, sinus pressure, sinus pain, sneezing, sore throat, tinnitus, trouble swallowing and voice change.    Eyes: Negative for photophobia, pain, discharge, redness, itching and visual disturbance.   Musculoskeletal: Negative for arthralgias, back pain, gait problem, joint swelling, myalgias, neck pain and neck stiffness.   Neurological: Negative for dizziness, tremors, seizures, syncope, facial asymmetry, speech difficulty, weakness, light-headedness, numbness and headaches.   Psychiatric/Behavioral: Negative for agitation, behavioral problems, confusion, decreased concentration, dysphoric mood, hallucinations, self-injury, sleep disturbance and suicidal ideas. The patient is not nervous/anxious and is not hyperactive.     Above ROS reviewed    The following portions of the patient's history were reviewed and updated as appropriate: allergies, current medications, past family history, past medical history, past social history, past surgical history and problem list.    Current Medications:   Current Outpatient Medications:   •  acetaminophen (TYLENOL) 500 MG tablet, Take 500 mg by mouth Every 6 (Six) Hours As Needed for Mild Pain ., Disp: , Rfl:   •  aspirin 81 MG chewable tablet, Chew 81 mg Daily., Disp: , Rfl:   •  bisoprolol (ZEBeta) 5 MG tablet, Take 1 tablet by mouth once daily, Disp: 90 tablet, Rfl: 0  •  Blood Glucose Monitoring Suppl (ONE TOUCH ULTRA 2) w/Device kit, , Disp: , Rfl:   •  colestipol (COLESTID) 1 g tablet, Take twice a day before meals, Disp: 180 tablet, Rfl: 3  •  escitalopram (LEXAPRO) 10 MG tablet, Take 1 tablet by mouth Daily., Disp: 90 tablet, Rfl: 1  •  fluticasone (VERAMYST) 27.5 MCG/SPRAY nasal spray, 2 sprays into each nostril Daily., Disp: , Rfl:   •  glucose blood  test strip, Use as instructed. please check blood sugar 2 times a day. Please prescribe to pt what insurance will cover. DX:E11.9, Disp: 200 each, Rfl: 12  •  glucose monitor monitoring kit, Use as directed. Check blood sugar twice daily. Please prescribe to pt what insurance will cover. DX:E11.9, Disp: 1 each, Rfl: 0  •  Lancets (onetouch ultrasoft) lancets, Use as directed. Pleas check blood sugar 2 times daily. Please prescribe to pt what insurance will cover DX:E11.9, Disp: 200 each, Rfl: 12  •  levETIRAcetam (KEPPRA) 500 MG tablet, Take one tablet by mouth in AM; Take two tablets by mouth in PM, Disp: 270 tablet, Rfl: 3  •  levocetirizine (XYZAL) 5 MG tablet, TAKE 1 TABLET BY MOUTH ONCE DAILY IN THE EVENING, Disp: 90 tablet, Rfl: 3  •  omeprazole (priLOSEC) 40 MG capsule, Take 1 capsule by mouth Daily., Disp: 90 capsule, Rfl: 3  •  Testosterone 1.62 % gel, APPLY 3 PUMPS TO THE SKIN AS DIRECTED TOPICALLY ONCE DAILY, Disp: , Rfl:   •  cyclobenzaprine (FLEXERIL) 10 MG tablet, Take 1 tablet by mouth At Night As Needed (shoulder pain)., Disp: 30 tablet, Rfl: 0  **I did not stop or change the above medications.  Patient's medication list was updated to reflect medications they have reported as currently taking, including medication changes made by other providers.    OBJECTIVE  Vitals:    10/27/22 0826   BP: 128/76   Pulse: 70   SpO2: 98%     Body mass index is 33.27 kg/m².    Diagnostics:    Laboratory Results:               Lab Results   Component Value Date    WBC 9.48 08/25/2022    HGB 15.9 08/25/2022    HCT 47.1 08/25/2022    MCV 85.0 08/25/2022     08/25/2022     Lab Results   Component Value Date    GLUCOSE 124 (H) 08/25/2022    BUN 13 08/25/2022    CREATININE 0.80 08/25/2022    EGFRIFNONA 144 03/23/2021    EGFRIFAFRI 139 08/29/2019    BCR 16.3 08/25/2022    K 4.7 08/25/2022    CO2 26.9 08/25/2022    CALCIUM 9.3 08/25/2022    PROTENTOTREF 7.1 08/29/2019    ALBUMIN 4.30 08/25/2022    LABIL2 2.4  08/29/2019    AST 19 08/25/2022    ALT 26 08/25/2022     Lab Results   Component Value Date    HGBA1C 6.50 (H) 08/25/2022     Lab Results   Component Value Date    CHOL 173 08/25/2022    CHOL 163 03/23/2021     Lab Results   Component Value Date    HDL 39 (L) 08/25/2022    HDL 42 03/23/2021    HDL 41 08/29/2019     Lab Results   Component Value Date     (H) 08/25/2022    LDL 94 03/23/2021    LDL 96 08/29/2019     Lab Results   Component Value Date    TRIG 163 (H) 08/25/2022    TRIG 157 (H) 03/23/2021    TRIG 97 08/29/2019     No results found for: RPR  Lab Results   Component Value Date    TSH 1.920 08/25/2022     No results found for: [id number]    Physical Exam:  GENERAL: NAD, overweight  HEENT: Normocephalic, atraumatic   COR: RRR  Resp: Even and unlabored  Extremities: No signs of distal embolization.   Skin: No rashes, lesions or ulcers.  Psychiatric: Normal mood and affect.    Neurological:   MS: AO. Language normal. No neglect. Follows all commands.  CN: II-XII grossly normal  Motor: Normal strength and tone throughout.  Sensory: Intact to light touch in arms and legs  Station and Gait: Normal gait and station.    Coordination: Normal finger to nose bilaterally    Impression/Plan: Mr. Orta presents today for follow-up of seizures maintained on Keppra, had a possible breakthrough event last year, Keppra level revealed subtherapeutic level at 9.5 despite good compliance, dose increased to 500 mg AM and 1000 mg PM.  He has done well with this regimen with no recurrent events and no a\dverse medication side effects.  We will continue the same.  In regards to his cavernous malformation, he has been previously evaluated by neurosurgery and is to follow-up with them as needed, no surveillance scans recommended.  He will follow-up here in 1 year, sooner if symptoms warrant.      I spent a total of 30 minutes today in reviewing records, prior diagnostics, examination of patient as well as counseling and  educating patient regarding diagnoses, symptoms, reviewing diagnostics with patient, pharmacologic treatment options including purpose, risk, benefits, possible side-effects, recommendations, lifestyle modifications, coordination of care and documenting plan of care.          Diagnoses and all orders for this visit:    1. Seizure (HCC) (Primary)    2. Cavernous malformation        Coding      Dictated using Dragon

## 2022-11-02 RX ORDER — LEVETIRACETAM 500 MG/1
TABLET ORAL
Qty: 270 TABLET | Refills: 0 | Status: SHIPPED | OUTPATIENT
Start: 2022-11-02 | End: 2023-01-19

## 2022-11-08 ENCOUNTER — OFFICE VISIT (OUTPATIENT)
Dept: FAMILY MEDICINE CLINIC | Facility: CLINIC | Age: 47
End: 2022-11-08

## 2022-11-08 ENCOUNTER — LAB (OUTPATIENT)
Dept: LAB | Facility: HOSPITAL | Age: 47
End: 2022-11-08

## 2022-11-08 VITALS
RESPIRATION RATE: 14 BRPM | HEIGHT: 72 IN | DIASTOLIC BLOOD PRESSURE: 80 MMHG | OXYGEN SATURATION: 98 % | BODY MASS INDEX: 33.58 KG/M2 | SYSTOLIC BLOOD PRESSURE: 123 MMHG | TEMPERATURE: 97.4 F | WEIGHT: 247.9 LBS | HEART RATE: 63 BPM

## 2022-11-08 DIAGNOSIS — J30.9 ALLERGIC RHINITIS, UNSPECIFIED SEASONALITY, UNSPECIFIED TRIGGER: ICD-10-CM

## 2022-11-08 DIAGNOSIS — M79.10 MUSCLE PAIN: ICD-10-CM

## 2022-11-08 DIAGNOSIS — R53.83 OTHER FATIGUE: Primary | ICD-10-CM

## 2022-11-08 LAB
ALBUMIN SERPL-MCNC: 4.2 G/DL (ref 3.5–5.2)
ALBUMIN/GLOB SERPL: 1.6 G/DL
ALP SERPL-CCNC: 87 U/L (ref 39–117)
ALT SERPL W P-5'-P-CCNC: 27 U/L (ref 1–41)
ANION GAP SERPL CALCULATED.3IONS-SCNC: 12 MMOL/L (ref 5–15)
AST SERPL-CCNC: 19 U/L (ref 1–40)
BASOPHILS # BLD AUTO: 0.05 10*3/MM3 (ref 0–0.2)
BASOPHILS NFR BLD AUTO: 0.5 % (ref 0–1.5)
BILIRUB SERPL-MCNC: 0.9 MG/DL (ref 0–1.2)
BUN SERPL-MCNC: 10 MG/DL (ref 6–20)
BUN/CREAT SERPL: 13.7 (ref 7–25)
CALCIUM SPEC-SCNC: 9.4 MG/DL (ref 8.6–10.5)
CHLORIDE SERPL-SCNC: 99 MMOL/L (ref 98–107)
CK SERPL-CCNC: 36 U/L (ref 20–200)
CO2 SERPL-SCNC: 28 MMOL/L (ref 22–29)
CREAT SERPL-MCNC: 0.73 MG/DL (ref 0.76–1.27)
DEPRECATED RDW RBC AUTO: 37.6 FL (ref 37–54)
EGFRCR SERPLBLD CKD-EPI 2021: 112.2 ML/MIN/1.73
EOSINOPHIL # BLD AUTO: 0.15 10*3/MM3 (ref 0–0.4)
EOSINOPHIL NFR BLD AUTO: 1.4 % (ref 0.3–6.2)
ERYTHROCYTE [DISTWIDTH] IN BLOOD BY AUTOMATED COUNT: 12.2 % (ref 12.3–15.4)
GLOBULIN UR ELPH-MCNC: 2.6 GM/DL
GLUCOSE SERPL-MCNC: 100 MG/DL (ref 65–99)
HCT VFR BLD AUTO: 46.2 % (ref 37.5–51)
HGB BLD-MCNC: 15.4 G/DL (ref 13–17.7)
IMM GRANULOCYTES # BLD AUTO: 0.03 10*3/MM3 (ref 0–0.05)
IMM GRANULOCYTES NFR BLD AUTO: 0.3 % (ref 0–0.5)
LYMPHOCYTES # BLD AUTO: 2.99 10*3/MM3 (ref 0.7–3.1)
LYMPHOCYTES NFR BLD AUTO: 28.3 % (ref 19.6–45.3)
MCH RBC QN AUTO: 28.1 PG (ref 26.6–33)
MCHC RBC AUTO-ENTMCNC: 33.3 G/DL (ref 31.5–35.7)
MCV RBC AUTO: 84.3 FL (ref 79–97)
MONOCYTES # BLD AUTO: 0.8 10*3/MM3 (ref 0.1–0.9)
MONOCYTES NFR BLD AUTO: 7.6 % (ref 5–12)
NEUTROPHILS NFR BLD AUTO: 6.54 10*3/MM3 (ref 1.7–7)
NEUTROPHILS NFR BLD AUTO: 61.9 % (ref 42.7–76)
NRBC BLD AUTO-RTO: 0 /100 WBC (ref 0–0.2)
PLATELET # BLD AUTO: 338 10*3/MM3 (ref 140–450)
PMV BLD AUTO: 8.8 FL (ref 6–12)
POTASSIUM SERPL-SCNC: 4.2 MMOL/L (ref 3.5–5.2)
PROT SERPL-MCNC: 6.8 G/DL (ref 6–8.5)
RBC # BLD AUTO: 5.48 10*6/MM3 (ref 4.14–5.8)
SODIUM SERPL-SCNC: 139 MMOL/L (ref 136–145)
WBC NRBC COR # BLD: 10.56 10*3/MM3 (ref 3.4–10.8)

## 2022-11-08 PROCEDURE — 82550 ASSAY OF CK (CPK): CPT | Performed by: FAMILY MEDICINE

## 2022-11-08 PROCEDURE — 36415 COLL VENOUS BLD VENIPUNCTURE: CPT | Performed by: FAMILY MEDICINE

## 2022-11-08 PROCEDURE — 99214 OFFICE O/P EST MOD 30 MIN: CPT | Performed by: FAMILY MEDICINE

## 2022-11-08 PROCEDURE — 85025 COMPLETE CBC W/AUTO DIFF WBC: CPT | Performed by: FAMILY MEDICINE

## 2022-11-08 PROCEDURE — 80053 COMPREHEN METABOLIC PANEL: CPT | Performed by: FAMILY MEDICINE

## 2022-11-08 RX ORDER — AZELASTINE 1 MG/ML
1 SPRAY, METERED NASAL 2 TIMES DAILY
Qty: 30 ML | Refills: 0 | Status: SHIPPED | OUTPATIENT
Start: 2022-11-08

## 2022-11-08 NOTE — PROGRESS NOTES
"Chief Complaint  Muscle Pain    Subjective        Austin Orta presents to [facility] PRIMARY CARE  History of Present Illness  Austin is a 47-year-old male seen today for muscle pain.    He states on 10/4/2022 he tested positive for COVID-19. After 2 weeks he got a stomach virus. Then he began having joint and muscle soreness. He reports his legs, groin, and muscles are sore. He denies any nausea or vomiting, but he did experience diarrhea. He denies any urinary issues. He has some ear fullness. His brain fog has gotten a lot better.     His sodium levels in 8/2022 were 131.     He confirms the use of Keppra for his history if seizures. He denies the use of cholesterol medication. He confirms the use of Flonase and Xyzal. He cannot take Sudafed due to his history of seizures. He states when he took prednisone he experienced a seizure and atrial fibrillation in 12/2018.     Objective   Vital Signs:  /80   Pulse 63   Temp 97.4 °F (36.3 °C) (Infrared)   Resp 14   Ht 182.9 cm (72.01\")   Wt 112 kg (247 lb 14.4 oz)   SpO2 98%   BMI 33.61 kg/m²   Estimated body mass index is 33.61 kg/m² as calculated from the following:    Height as of this encounter: 182.9 cm (72.01\").    Weight as of this encounter: 112 kg (247 lb 14.4 oz).          Physical Exam  Constitutional:       Appearance: Normal appearance. He is well-developed.   HENT:      Head: Normocephalic and atraumatic.      Right Ear: Tympanic membrane, ear canal and external ear normal.      Left Ear: Tympanic membrane, ear canal and external ear normal.      Nose: Nose normal.      Mouth/Throat:      Mouth: Mucous membranes are moist.   Eyes:      General: No scleral icterus.     Extraocular Movements: Extraocular movements intact.      Conjunctiva/sclera: Conjunctivae normal.      Pupils: Pupils are equal, round, and reactive to light.   Neck:      Thyroid: No thyromegaly.   Cardiovascular:      Rate and Rhythm: Normal rate and regular " rhythm.      Pulses: Normal pulses.      Heart sounds: Normal heart sounds.   Pulmonary:      Effort: Pulmonary effort is normal. No respiratory distress.      Breath sounds: Normal breath sounds. No wheezing or rales.   Abdominal:      General: Bowel sounds are normal. There is no distension.      Palpations: Abdomen is soft. There is no mass.      Tenderness: There is no abdominal tenderness.      Hernia: No hernia is present.   Musculoskeletal:         General: No swelling or tenderness. Normal range of motion.      Cervical back: Normal range of motion and neck supple.   Lymphadenopathy:      Cervical: No cervical adenopathy.   Skin:     General: Skin is warm.   Neurological:      General: No focal deficit present.      Mental Status: He is alert and oriented to person, place, and time.      Cranial Nerves: No cranial nerve deficit.      Sensory: No sensory deficit.      Deep Tendon Reflexes: Reflexes normal.   Psychiatric:         Mood and Affect: Mood normal.         Behavior: Behavior normal.         Thought Content: Thought content normal.         Judgment: Judgment normal.        Result Review :                Assessment and Plan   Diagnoses and all orders for this visit:    1. Other fatigue (Primary)  -     Comprehensive Metabolic Panel  -     CBC & Differential    2. Muscle pain  -     Comprehensive Metabolic Panel  -     CK    3. Allergic rhinitis, unspecified seasonality, unspecified trigger  -     azelastine (ASTELIN) 0.1 % nasal spray; 1 spray into the nostril(s) as directed by provider 2 (Two) Times a Day.  Dispense: 30 mL; Refill: 0  Austin Is a 47-year-old male patient seen today for muscle pain. Patient gives a history of having a COVID-19 infection 4 weeks ago. 2 weeks after that, he had episode of gastroenteritis. Since then, he feels muscle pain, fatigue, and joint pain. He admits that he was not drinking enough water at that time. He had  diarrhea. His electrolytes, CMP, CBC, and CK will be  checked. He was advised to increase fluids. he can drink things like Gatorade and Powerade and eat bananas. He will be called with his lab results.     He also was seen for allergic rhinitis. He was advised to continue Flonase nasal spray. He gives a history of atrial fibrillation induced by Medrol dosepak. He also  cannot take Sudafed. He will start on Aselin, local nasal spray.           Follow Up   No follow-ups on file.  Patient was given instructions and counseling regarding his condition or for health maintenance advice. Please see specific information pulled into the AVS if appropriate.   Transcribed from ambient dictation for Gisela Vargas MD by Jolie Serrato.  11/08/22   13:27 EST    Patient or patient representative verbalized consent to the visit recording.

## 2022-12-01 ENCOUNTER — OFFICE VISIT (OUTPATIENT)
Dept: CARDIOLOGY | Facility: CLINIC | Age: 47
End: 2022-12-01

## 2022-12-01 VITALS
WEIGHT: 246 LBS | SYSTOLIC BLOOD PRESSURE: 113 MMHG | DIASTOLIC BLOOD PRESSURE: 75 MMHG | HEART RATE: 53 BPM | HEIGHT: 72 IN | BODY MASS INDEX: 33.32 KG/M2

## 2022-12-01 DIAGNOSIS — I10 PRIMARY HYPERTENSION: Primary | ICD-10-CM

## 2022-12-01 DIAGNOSIS — I48.0 PAROXYSMAL ATRIAL FIBRILLATION: ICD-10-CM

## 2022-12-01 DIAGNOSIS — E78.2 MIXED HYPERLIPIDEMIA: ICD-10-CM

## 2022-12-01 PROCEDURE — 99213 OFFICE O/P EST LOW 20 MIN: CPT | Performed by: INTERNAL MEDICINE

## 2022-12-01 PROCEDURE — 93000 ELECTROCARDIOGRAM COMPLETE: CPT | Performed by: INTERNAL MEDICINE

## 2022-12-01 NOTE — PROGRESS NOTES
1 yr follow up    Subjective:        Austin Orta is a 47 y.o. male who here for follow up    CC  1 year follow-up for hyperlipidemia atrial fibrillation and hypertension  HPI  47-year-old male with mixed hyperlipidemia atrial fibrillation hypertension here for the follow-up with no complaints of chest pains tightness heaviness or the pressure sensation     Problems Addressed this Visit        Cardiac and Vasculature    Mixed hyperlipidemia    Atrial fibrillation (HCC)    Primary hypertension - Primary   Diagnoses       Codes Comments    Primary hypertension    -  Primary ICD-10-CM: I10  ICD-9-CM: 401.9     Mixed hyperlipidemia     ICD-10-CM: E78.2  ICD-9-CM: 272.2     Paroxysmal atrial fibrillation (HCC)     ICD-10-CM: I48.0  ICD-9-CM: 427.31         .    The following portions of the patient's history were reviewed and updated as appropriate: allergies, current medications, past family history, past medical history, past social history, past surgical history and problem list.    Past Medical History:   Diagnosis Date   • Allergic     seasonal   • Anxiety    • Anxiety and depression    • Cavernous malformation    • Depression    • Frozen shoulder    • GERD (gastroesophageal reflux disease)    • History of prostatitis    • Injury of right heel 07/27/2018   • Insomnia    • Joint pain    • Kidney stones 2017 1997, 2010 also   • Left rotator cuff tear    • Paroxysmal atrial fibrillation (HCC) 04/11/2019    Added automatically from request for surgery [id number]   • Pre-diabetes    • Prostatitis    • Right rotator cuff tear    • Venous angioma of brain (HCC)      reports that he has never smoked. He has never used smokeless tobacco. He reports that he does not drink alcohol and does not use drugs.   Family History   Problem Relation Age of Onset   • Cancer Mother    • Hypertension Mother    • Nephrolithiasis Sister    • Heart attack Maternal Grandmother    • Cancer Maternal Grandmother    • Skin cancer Father    •  "Heart attack Maternal Grandfather    • Heart disease Maternal Grandfather    • Dementia Paternal Grandmother    • Alzheimer's disease Paternal Grandmother    • Skin cancer Paternal Grandfather        Review of Systems  Constitutional: No wt loss, fever, fatigue  Gastrointestinal: No nausea, abdominal pain  Behavioral/Psych: No insomnia or anxiety   Cardiovascular no chest pains or tightness in the chest  Objective:       Physical Exam  /75   Pulse 53   Ht 182.9 cm (72.01\")   Wt 112 kg (246 lb)   BMI 33.35 kg/m²   General appearance: No acute changes   Neck: Trachea midline; NECK, supple, no thyromegaly or lymphadenopathy   Lungs: Normal size and shape, normal breath sounds, equal distribution of air, no rales and rhonchi   CV: S1-S2 regular, no murmurs, no rub, no gallop   Abdomen: Soft, nontender; no masses , no abnormal abdominal sounds   Extremities: No deformity , normal color , no peripheral edema   Skin: Normal temperature, turgor and texture; no rash, ulcers            ECG 12 Lead    Date/Time: 12/1/2022 12:48 PM  Performed by: Bashir Syed MD  Authorized by: Bashir Syed MD   Comparison: compared with previous ECG   Similar to previous ECG  Rhythm: sinus rhythm  ST Flattening: all    Clinical impression: non-specific ECG              Echocardiogram:        Current Outpatient Medications:   •  acetaminophen (TYLENOL) 500 MG tablet, Take 500 mg by mouth Every 6 (Six) Hours As Needed for Mild Pain ., Disp: , Rfl:   •  aspirin 81 MG chewable tablet, Chew 81 mg Daily., Disp: , Rfl:   •  azelastine (ASTELIN) 0.1 % nasal spray, 1 spray into the nostril(s) as directed by provider 2 (Two) Times a Day., Disp: 30 mL, Rfl: 0  •  bisoprolol (ZEBeta) 5 MG tablet, Take 1 tablet by mouth once daily, Disp: 90 tablet, Rfl: 0  •  Blood Glucose Monitoring Suppl (ONE TOUCH ULTRA 2) w/Device kit, , Disp: , Rfl:   •  colestipol (COLESTID) 1 g tablet, Take twice a day before meals, Disp: 180 tablet, " Rfl: 3  •  cyclobenzaprine (FLEXERIL) 10 MG tablet, Take 1 tablet by mouth At Night As Needed (shoulder pain)., Disp: 30 tablet, Rfl: 0  •  escitalopram (LEXAPRO) 10 MG tablet, Take 1 tablet by mouth Daily., Disp: 90 tablet, Rfl: 1  •  fluticasone (VERAMYST) 27.5 MCG/SPRAY nasal spray, 2 sprays into each nostril Daily., Disp: , Rfl:   •  glucose blood test strip, Use as instructed. please check blood sugar 2 times a day. Please prescribe to pt what insurance will cover. DX:E11.9, Disp: 200 each, Rfl: 12  •  glucose monitor monitoring kit, Use as directed. Check blood sugar twice daily. Please prescribe to pt what insurance will cover. DX:E11.9, Disp: 1 each, Rfl: 0  •  Lancets (onetouch ultrasoft) lancets, Use as directed. Pleas check blood sugar 2 times daily. Please prescribe to pt what insurance will cover DX:E11.9, Disp: 200 each, Rfl: 12  •  levETIRAcetam (KEPPRA) 500 MG tablet, TAKE ONE TABLET BY MOUTH IN THE AM AND TAKE TWO TABLETS BY MOUTH IN THE PM, Disp: 270 tablet, Rfl: 0  •  levocetirizine (XYZAL) 5 MG tablet, TAKE 1 TABLET BY MOUTH ONCE DAILY IN THE EVENING, Disp: 90 tablet, Rfl: 3  •  omeprazole (priLOSEC) 40 MG capsule, Take 1 capsule by mouth Daily., Disp: 90 capsule, Rfl: 3  •  Testosterone 1.62 % gel, APPLY 3 PUMPS TO THE SKIN AS DIRECTED TOPICALLY ONCE DAILY, Disp: , Rfl:    Assessment:        Patient Active Problem List   Diagnosis   • Atopic rhinitis   • Biliary dyskinesia   • Diarrhea   • Gastroesophageal reflux disease   • Insomnia   • Prostatitis   • Chronic fatigue   • Seasonal allergies   • Right shoulder pain   • Mixed hyperlipidemia   • Non morbid obesity due to excess calories   • Eustachian tube dysfunction   • Rotator cuff arthropathy   • Viral syndrome   • Pain of both hip joints   • Abdominal pain   • Acute bilateral low back pain without sciatica   • Strain of Achilles tendon, initial encounter   • Depression   • Seizure (HCC)   • Atrial fibrillation (HCC)   • Cavernous malformation    • Anticoagulated   • Venous angioma of brain (HCC)   • Anxiety   • Calculus of kidney   • Primary hypertension               Plan:            ICD-10-CM ICD-9-CM   1. Primary hypertension  I10 401.9   2. Mixed hyperlipidemia  E78.2 272.2   3. Paroxysmal atrial fibrillation (HCC)  I48.0 427.31     1. Primary hypertension  Continue current treatment    2. Mixed hyperlipidemia  Continue current treatment    3. Paroxysmal atrial fibrillation (HCC)  Continue current treatment       1 YR  COUNSELING:    Austin Griffiths was given to patient for the following topics: diagnostic results, risk factor reductions, impressions, risks and benefits of treatment options and importance of treatment compliance .       SMOKING COUNSELING:        Dictated using Dragon dictation

## 2022-12-07 PROBLEM — I10 PRIMARY HYPERTENSION: Status: ACTIVE | Noted: 2022-12-07

## 2022-12-29 NOTE — TELEPHONE ENCOUNTER
Rx Refill Note  Requested Prescriptions     Pending Prescriptions Disp Refills   • levocetirizine (XYZAL) 5 MG tablet [Pharmacy Med Name: Levocetirizine Dihydrochloride 5 MG Oral Tablet] 90 tablet 0     Sig: TAKE 1 TABLET BY MOUTH ONCE DAILY IN THE EVENING      Last office visit with prescribing clinician: 9/15/2022   Last telemedicine visit with prescribing clinician: Visit date not found   Next office visit with prescribing clinician: Visit date not found                         Would you like a call back once the refill request has been completed: [] Yes [] No    If the office needs to give you a call back, can they leave a voicemail: [] Yes [] No    Luis Vargas Rep  12/29/22, 16:57 EST

## 2022-12-30 RX ORDER — LEVOCETIRIZINE DIHYDROCHLORIDE 5 MG/1
TABLET, FILM COATED ORAL
Qty: 90 TABLET | Refills: 0 | Status: SHIPPED | OUTPATIENT
Start: 2022-12-30 | End: 2023-04-04

## 2023-01-10 RX ORDER — MELOXICAM 15 MG/1
TABLET ORAL
Qty: 90 TABLET | Refills: 0 | Status: SHIPPED | OUTPATIENT
Start: 2023-01-10 | End: 2023-04-04

## 2023-01-10 NOTE — TELEPHONE ENCOUNTER
Rx Refill Note  Requested Prescriptions     Pending Prescriptions Disp Refills   • meloxicam (MOBIC) 15 MG tablet [Pharmacy Med Name: Meloxicam 15 MG Oral Tablet] 90 tablet 0     Sig: Take 1 tablet by mouth once daily      Last office visit with prescribing clinician: 9/15/2022   Last telemedicine visit with prescribing clinician: Visit date not found   Next office visit with prescribing clinician: Visit date not found                         Would you like a call back once the refill request has been completed: [] Yes [] No    If the office needs to give you a call back, can they leave a voicemail: [] Yes [] No    Luis Vargas Rep  01/10/23, 14:44 EST

## 2023-01-19 RX ORDER — LEVETIRACETAM 500 MG/1
TABLET ORAL
Qty: 270 TABLET | Refills: 0 | Status: SHIPPED | OUTPATIENT
Start: 2023-01-19

## 2023-03-23 DIAGNOSIS — F32.A DEPRESSION, UNSPECIFIED DEPRESSION TYPE: ICD-10-CM

## 2023-03-23 RX ORDER — ESCITALOPRAM OXALATE 10 MG/1
TABLET ORAL
Qty: 90 TABLET | Refills: 0 | Status: SHIPPED | OUTPATIENT
Start: 2023-03-23

## 2023-03-23 NOTE — TELEPHONE ENCOUNTER
Rx Refill Note  Requested Prescriptions     Pending Prescriptions Disp Refills   • escitalopram (LEXAPRO) 10 MG tablet [Pharmacy Med Name: Escitalopram Oxalate 10 MG Oral Tablet] 30 tablet 0     Sig: Take 1 tablet by mouth once daily      Last office visit with prescribing clinician: 9/15/2022   Last telemedicine visit with prescribing clinician: Visit date not found   Next office visit with prescribing clinician: Visit date not found                         Would you like a call back once the refill request has been completed: [] Yes [] No    If the office needs to give you a call back, can they leave a voicemail: [] Yes [] No    Luis Vargas Rep  03/23/23, 16:47 EDT

## 2023-04-04 RX ORDER — LEVOCETIRIZINE DIHYDROCHLORIDE 5 MG/1
TABLET, FILM COATED ORAL
Qty: 90 TABLET | Refills: 0 | Status: SHIPPED | OUTPATIENT
Start: 2023-04-04

## 2023-04-04 RX ORDER — MELOXICAM 15 MG/1
TABLET ORAL
Qty: 30 TABLET | Refills: 0 | Status: SHIPPED | OUTPATIENT
Start: 2023-04-04

## 2023-04-05 RX ORDER — BISOPROLOL FUMARATE 5 MG/1
TABLET, FILM COATED ORAL
Qty: 90 TABLET | Refills: 2 | Status: SHIPPED | OUTPATIENT
Start: 2023-04-05

## 2023-04-19 RX ORDER — LEVETIRACETAM 500 MG/1
TABLET ORAL
Qty: 270 TABLET | Refills: 0 | Status: SHIPPED | OUTPATIENT
Start: 2023-04-19

## 2023-04-26 DIAGNOSIS — K90.9 DIARRHEA DUE TO MALABSORPTION: ICD-10-CM

## 2023-04-26 DIAGNOSIS — R19.7 DIARRHEA DUE TO MALABSORPTION: ICD-10-CM

## 2023-04-26 RX ORDER — MELOXICAM 15 MG/1
15 TABLET ORAL DAILY
Qty: 90 TABLET | Refills: 0 | Status: SHIPPED | OUTPATIENT
Start: 2023-04-26

## 2023-04-26 RX ORDER — MONTELUKAST SODIUM 4 MG/1
TABLET, CHEWABLE ORAL
Qty: 180 TABLET | Refills: 3 | Status: SHIPPED | OUTPATIENT
Start: 2023-04-26

## 2023-04-26 NOTE — TELEPHONE ENCOUNTER
Rx Refill Note  Requested Prescriptions     Pending Prescriptions Disp Refills   • meloxicam (MOBIC) 15 MG tablet [Pharmacy Med Name: Meloxicam 15 MG Oral Tablet] 30 tablet 0     Sig: Take 1 tablet by mouth once daily      Last office visit with prescribing clinician: Visit date not found   Last telemedicine visit with prescribing clinician: Visit date not found   Next office visit with prescribing clinician: Visit date not found                         Would you like a call back once the refill request has been completed: [] Yes [] No    If the office needs to give you a call back, can they leave a voicemail: [] Yes [] No    Edinson Orona  04/26/23, 10:10 EDT

## 2023-05-05 ENCOUNTER — TELEPHONE (OUTPATIENT)
Dept: NEUROLOGY | Facility: CLINIC | Age: 48
End: 2023-05-05
Payer: COMMERCIAL

## 2023-05-05 NOTE — TELEPHONE ENCOUNTER
5/5 Spoke & scheduled w/ pt regarding move from [facility] to different provider. MyChart (if applicable) & mail reminder sent.

## 2023-08-14 DIAGNOSIS — F32.A DEPRESSION, UNSPECIFIED DEPRESSION TYPE: ICD-10-CM

## 2023-08-14 RX ORDER — LEVETIRACETAM 500 MG/1
TABLET ORAL
Qty: 270 TABLET | Refills: 0 | Status: SHIPPED | OUTPATIENT
Start: 2023-08-14

## 2023-08-14 RX ORDER — ESCITALOPRAM OXALATE 10 MG/1
TABLET ORAL
Qty: 90 TABLET | Refills: 0 | Status: SHIPPED | OUTPATIENT
Start: 2023-08-14

## 2023-08-14 RX ORDER — MELOXICAM 15 MG/1
TABLET ORAL
Qty: 90 TABLET | Refills: 0 | Status: SHIPPED | OUTPATIENT
Start: 2023-08-14

## 2023-08-14 NOTE — TELEPHONE ENCOUNTER
Rx Refill Note  Requested Prescriptions     Pending Prescriptions Disp Refills    meloxicam (MOBIC) 15 MG tablet [Pharmacy Med Name: Meloxicam 15 MG Oral Tablet] 90 tablet 0     Sig: TAKE 1 TABLET BY MOUTH ONCE DAILY AS NEEDED TAKE  WITH  FOOD  AND  WATER      Last office visit with prescribing clinician: 9/15/2022   Last telemedicine visit with prescribing clinician: Visit date not found   Next office visit with prescribing clinician: Visit date not found                         Would you like a call back once the refill request has been completed: [] Yes [] No    If the office needs to give you a call back, can they leave a voicemail: [] Yes [] No    Hailee Larson MA  08/14/23, 10:45 EDT

## 2023-08-14 NOTE — TELEPHONE ENCOUNTER
Rx Refill Note  Requested Prescriptions     Pending Prescriptions Disp Refills    escitalopram (LEXAPRO) 10 MG tablet [Pharmacy Med Name: Escitalopram Oxalate 10 MG Oral Tablet] 90 tablet 0     Sig: Take 1 tablet by mouth once daily      Last office visit with prescribing clinician: 11/22  Last telemedicine visit with prescribing clinician: Visit date not found   Next office visit with prescribing clinician: Visit date not found                         Would you like a call back once the refill request has been completed: [] Yes [] No    If the office needs to give you a call back, can they leave a voicemail: [] Yes [] No    Abigail Beltran LPN  08/14/23, 12:12 EDT

## 2023-08-22 ENCOUNTER — OFFICE VISIT (OUTPATIENT)
Dept: FAMILY MEDICINE CLINIC | Facility: CLINIC | Age: 48
End: 2023-08-22
Payer: COMMERCIAL

## 2023-08-22 ENCOUNTER — TELEPHONE (OUTPATIENT)
Dept: NEUROLOGY | Facility: CLINIC | Age: 48
End: 2023-08-22
Payer: COMMERCIAL

## 2023-08-22 VITALS
OXYGEN SATURATION: 96 % | HEART RATE: 67 BPM | DIASTOLIC BLOOD PRESSURE: 68 MMHG | RESPIRATION RATE: 14 BRPM | TEMPERATURE: 97.3 F | HEIGHT: 72 IN | WEIGHT: 251 LBS | BODY MASS INDEX: 34 KG/M2 | SYSTOLIC BLOOD PRESSURE: 118 MMHG

## 2023-08-22 DIAGNOSIS — R73.03 PREDIABETES: ICD-10-CM

## 2023-08-22 DIAGNOSIS — F32.A DEPRESSION, UNSPECIFIED DEPRESSION TYPE: ICD-10-CM

## 2023-08-22 DIAGNOSIS — Z12.5 PROSTATE CANCER SCREENING: ICD-10-CM

## 2023-08-22 DIAGNOSIS — I10 PRIMARY HYPERTENSION: ICD-10-CM

## 2023-08-22 DIAGNOSIS — E78.2 MIXED HYPERLIPIDEMIA: ICD-10-CM

## 2023-08-22 DIAGNOSIS — Z00.00 HEALTH MAINTENANCE EXAMINATION: Primary | ICD-10-CM

## 2023-08-22 DIAGNOSIS — M77.12 LATERAL EPICONDYLITIS OF LEFT ELBOW: ICD-10-CM

## 2023-08-22 PROBLEM — I48.91 ATRIAL FIBRILLATION: Status: RESOLVED | Noted: 2018-12-09 | Resolved: 2023-08-22

## 2023-08-22 PROCEDURE — 99396 PREV VISIT EST AGE 40-64: CPT | Performed by: NURSE PRACTITIONER

## 2023-08-22 RX ORDER — ESCITALOPRAM OXALATE 10 MG/1
10 TABLET ORAL DAILY
Qty: 90 TABLET | Refills: 1 | Status: SHIPPED | OUTPATIENT
Start: 2023-08-22

## 2023-08-22 NOTE — PATIENT INSTRUCTIONS
Discharge instructions continue the changes you made dietary you are doing great you are lowering your risk of developing diabetes as you have insulin resistance now prediabetes, gradual weight loss, focus on decreasing breads pastas and sweets when you sit down to the dinner    Identify the carbohydrates and minimize those more fiber  64 ounces of water daily consider intermittent fasting for some,  Eat mostly vegetables with high fiber content this slows down the absorption of sugars,    Continue walking daily,  Should you have more difficulty maintaining your goal or hip substantial plateaus then we may consider  Potentially Ozempic  Medication you can consider this for prediabetes up-to-date well-respected  Medical organization to evaluate research and guidelines, recommends this to be considered with prediabetes in the presence of obesity or core mobilities.    Another option possibly  Wegovy, same medication just a little higher dose treatment for obesity    These are newer but well-established generation of medication appear to be quite safe and substantially may decrease appetite,    Steady as she goes, follow-up prediabetes and Mediterranean healthy eating tips,  As long as doing well follow-up in 6 months,    Labs soon fasting

## 2023-08-22 NOTE — PROGRESS NOTES
"Chief Complaint  Med Refill and Annual Exam    Subjective        Austin Orta presents to [facility] PRIMARY CARE  History of Present Illness    Objective   Vital Signs:  /68   Pulse 67   Temp 97.3 øF (36.3 øC) (Temporal)   Resp 14   Ht 182.9 cm (72.01\")   Wt 114 kg (251 lb)   SpO2 96%   BMI 34.03 kg/mý   Estimated body mass index is 34.03 kg/mý as calculated from the following:    Height as of this encounter: 182.9 cm (72.01\").    Weight as of this encounter: 114 kg (251 lb).          Physical Exam   Result Review :                Assessment and Plan   Diagnoses and all orders for this visit:    1. Health maintenance examination (Primary)  -     Hemoglobin A1c; Future  -     CBC & Differential; Future  -     Comprehensive Metabolic Panel; Future  -     Lipid Panel With LDL / HDL Ratio; Future  -     Urinalysis With Microscopic - Urine, Clean Catch; Future  -     PSA Screen; Future  -     TSH Rfx On Abnormal To Free T4; Future    2. Depression, unspecified depression type  -     escitalopram (LEXAPRO) 10 MG tablet; Take 1 tablet by mouth Daily.  Dispense: 90 tablet; Refill: 1  -     Hemoglobin A1c; Future  -     CBC & Differential; Future  -     Comprehensive Metabolic Panel; Future  -     Lipid Panel With LDL / HDL Ratio; Future  -     Urinalysis With Microscopic - Urine, Clean Catch; Future  -     PSA Screen; Future  -     TSH Rfx On Abnormal To Free T4; Future    3. Lateral epicondylitis of left elbow  -     Hemoglobin A1c; Future  -     CBC & Differential; Future  -     Comprehensive Metabolic Panel; Future  -     Lipid Panel With LDL / HDL Ratio; Future  -     Urinalysis With Microscopic - Urine, Clean Catch; Future  -     PSA Screen; Future  -     TSH Rfx On Abnormal To Free T4; Future    4. Primary hypertension  -     Hemoglobin A1c; Future  -     CBC & Differential; Future  -     Comprehensive Metabolic Panel; Future  -     Lipid Panel With LDL / HDL Ratio; Future  -     " Urinalysis With Microscopic - Urine, Clean Catch; Future  -     PSA Screen; Future  -     TSH Rfx On Abnormal To Free T4; Future    5. Mixed hyperlipidemia  -     Hemoglobin A1c; Future  -     CBC & Differential; Future  -     Comprehensive Metabolic Panel; Future  -     Lipid Panel With LDL / HDL Ratio; Future  -     Urinalysis With Microscopic - Urine, Clean Catch; Future  -     PSA Screen; Future  -     TSH Rfx On Abnormal To Free T4; Future    6. Prostate cancer screening  -     Hemoglobin A1c; Future  -     CBC & Differential; Future  -     Comprehensive Metabolic Panel; Future  -     Lipid Panel With LDL / HDL Ratio; Future  -     Urinalysis With Microscopic - Urine, Clean Catch; Future  -     PSA Screen; Future  -     TSH Rfx On Abnormal To Free T4; Future    7. Prediabetes  -     Hemoglobin A1c; Future  -     CBC & Differential; Future  -     Comprehensive Metabolic Panel; Future  -     Lipid Panel With LDL / HDL Ratio; Future  -     Urinalysis With Microscopic - Urine, Clean Catch; Future  -     PSA Screen; Future  -     TSH Rfx On Abnormal To Free T4; Future             Follow Up   Return in about 6 months (around 2/22/2024), or labs soon, for Labs before next visit.  Patient was given instructions and counseling regarding his condition or for health maintenance advice. Please see specific information pulled into the AVS if appropriate.     Patient Instructions   Discharge instructions continue the changes you made dietary you are doing great you are lowering your risk of developing diabetes as you have insulin resistance now prediabetes, gradual weight loss, focus on decreasing breads pastas and sweets when you sit down to the dinner    Identify the carbohydrates and minimize those more fiber  64 ounces of water daily consider intermittent fasting for some,  Eat mostly vegetables with high fiber content this slows down the absorption of sugars,    Continue walking daily,  Should you have more difficulty  maintaining your goal or hip substantial plateaus then we may consider  Potentially Ozempic  Medication you can consider this for prediabetes up-to-date well-respected  Medical organization to evaluate research and guidelines, recommends this to be considered with prediabetes in the presence of obesity or core mobilities.    Another option possibly  Wegovy, same medication just a little higher dose treatment for obesity    These are newer but well-established generation of medication appear to be quite safe and substantially may decrease appetite,    Steady as she goes, follow-up prediabetes and Mediterranean healthy eating tips,  As long as doing well follow-up in 6 months,    Labs soon fasting     Debrox  Over-the-counter drops 5 drops right ear for 1 week then  [facility] or here with an appointment  For irrigation

## 2023-09-13 ENCOUNTER — OFFICE VISIT (OUTPATIENT)
Dept: GASTROENTEROLOGY | Facility: CLINIC | Age: 48
End: 2023-09-13
Payer: COMMERCIAL

## 2023-09-13 VITALS
TEMPERATURE: 97.8 F | DIASTOLIC BLOOD PRESSURE: 82 MMHG | BODY MASS INDEX: 33.48 KG/M2 | WEIGHT: 247.2 LBS | OXYGEN SATURATION: 98 % | SYSTOLIC BLOOD PRESSURE: 120 MMHG | HEIGHT: 72 IN | HEART RATE: 96 BPM

## 2023-09-13 DIAGNOSIS — K90.9 DIARRHEA DUE TO MALABSORPTION: ICD-10-CM

## 2023-09-13 DIAGNOSIS — R19.7 DIARRHEA DUE TO MALABSORPTION: ICD-10-CM

## 2023-09-13 DIAGNOSIS — K21.00 GASTROESOPHAGEAL REFLUX DISEASE WITH ESOPHAGITIS WITHOUT HEMORRHAGE: Primary | ICD-10-CM

## 2023-09-13 RX ORDER — DICYCLOMINE HYDROCHLORIDE 10 MG/1
10 CAPSULE ORAL 3 TIMES DAILY PRN
Qty: 90 CAPSULE | Refills: 1 | Status: SHIPPED | OUTPATIENT
Start: 2023-09-13

## 2023-09-13 NOTE — PATIENT INSTRUCTIONS
next colon cancer screening due in 6/2030      For Diarrhea:    Continue Colestipol two times per day   Can take dicyclomine 10 mg three times as needed for urgency and diarrhea  If this is not helpful can start a daily fiber supplementation as below         Recommend starting a soluble fiber regimen that includes psyllium such as Metamucil.  This helps to keep stool soft and formed and easy transit throughout the colon to produce regular and complete bowel movements.  Consume at least 20 to 30 g of dietary fiber per day    When starting fiber regimen recommend starting with a low-dose and gradually increasing by 1 tablespoon every 7 days as tolerated.  This will help your body acclimate to the higher fiber diet and reduce risk of unwanted side effects that include bloating, gas, abdominal fullness, and cramping  For example: Begin taking 1 tablespoon daily for at least 7 days, if this is not successful in producing regular bowel movements can begin taking 1 tablespoons twice a day, and finally if this is not successful after taking 2 tablespoons for 7 days, can further increase to maximum recommended dosing of 1 tablespoon 3 times per day.  It is important to consume increased amounts of fluid throughout the day to help improve the effectiveness of the fiber supplementation  Avoid gummy formulations of fiber as this can further increase your risk of the above adverse effects due to artificial sweeteners in the Gummies.

## 2023-09-13 NOTE — PROGRESS NOTES
"Chief Complaint   Patient presents with    Follow-up     Diarrhea, GERD           History of Present Illness    Patient is a 48 y.o. who presents to the office for follow up evaluation.  Last in office visit was on  9/13/2022 . Patient has a significant past medical history of GERD, hiatal Hernia, cholecystectomy, and diarrhea.     Last EGD and colonoscopy were performed on 6/5/20  By Dr. Ulrich and remarkable for LA grade A esophagitis, fundic gland polyps, and gastritis.  Further assess with biopsies were unremarkable for celiac disease and h. Pylori, however notable for reactive gastropathy.     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  Random biopsies of colon were negative for microscopic colitis.  Recommended patient follow up for further evaluation of mucosal neuroma and next colon cancer screening due in 6/2030.    For GERD, patient continues omeprazole 40 mg once daily approximately 30 to 60 minutes prior to breakfast.  Describes symptoms as overall well controlled without heartburn, nausea, vomiting, or dysphagia.    States that until approximately 2 weeks ago when traveling to [place] his bowel habits were well controlled with twice daily colestipol.  However states that while traveling to [place] he consumed known food triggers to diarrhea and upon returning home experienced multiple episodes of diarrhea with urgency that have since gradually improved.  Denies melena or hematochezia.  Denies no known ill contact fever or chills or recent antibiotic use.    Patient denies known family history of colon polyps, colon cancer, or IBD.       Result Review :         Office Visit with Tracie Mata APRN (09/13/2022)     Vital Signs:   /82   Pulse 96   Temp 97.8 °F (36.6 °C)   Ht 182.9 cm (72\")   Wt 112 kg (247 lb 3.2 oz)   SpO2 98%   BMI 33.53 kg/m²     Body mass index is 33.53 kg/m².   "   Physical Exam  Vitals reviewed.   Constitutional:       General: He is not in acute distress.     Appearance: Normal appearance. He is not ill-appearing.   Eyes:      General: No scleral icterus.  Pulmonary:      Effort: Pulmonary effort is normal. No respiratory distress.   Abdominal:      General: Bowel sounds are normal. There is no distension.      Palpations: Abdomen is soft. Abdomen is not rigid. There is no mass or pulsatile mass.      Tenderness: There is no abdominal tenderness. There is no guarding or rebound.      Hernia: No hernia is present.   Skin:     Coloration: Skin is not jaundiced.   Neurological:      Mental Status: He is alert and oriented to person, place, and time.   Psychiatric:         Thought Content: Thought content normal.         Judgment: Judgment normal.         Assessment and Plan    Diagnoses and all orders for this visit:    1. Gastroesophageal reflux disease with esophagitis without hemorrhage (Primary)    2. Diarrhea due to malabsorption  -     dicyclomine (BENTYL) 10 MG capsule; Take 1 capsule by mouth 3 (Three) Times a Day As Needed (abdominal pain/diarrhea). Take one tablet up to 3 times a day as needed for abdominal pain or diarrhea.  Dispense: 90 capsule; Refill: 1           Discussion:    Patient presents for annual follow-up management of postcholecystectomy diarrhea and GERD.  Overall outside of the past 2 weeks he has been doing well from a GI standpoint.  Patient self-admittedly consumed known triggers to lower GI symptoms and since avoiding upon his return home his GI symptoms are improving.    Prescription for dicyclomine provided to patient for fecal urgency and encouraged him to take as needed up to 3 times a day and continue current colestipol regimen.  If this is unsuccessful in controlling bowel movements recommend starting low-dose fiber supplementation to improve stool consistency and promote complete evacuation of stool.  He will contact us if symptoms  persist despite above outlined regimen otherwise he will follow-up in 12 months for continued management.     Patient is agreeable to the outlined above treatment plan.  Verbalizes understanding and will contact office for any new or worsening concerns.  All questions answered and support provided.        Patient Instructions   next colon cancer screening due in 6/2030      For Diarrhea:    Continue Colestipol two times per day   Can take dicyclomine 10 mg three times as needed for urgency and diarrhea  If this is not helpful can start a daily fiber supplementation as below         Recommend starting a soluble fiber regimen that includes psyllium such as Metamucil.  This helps to keep stool soft and formed and easy transit throughout the colon to produce regular and complete bowel movements.  Consume at least 20 to 30 g of dietary fiber per day    When starting fiber regimen recommend starting with a low-dose and gradually increasing by 1 tablespoon every 7 days as tolerated.  This will help your body acclimate to the higher fiber diet and reduce risk of unwanted side effects that include bloating, gas, abdominal fullness, and cramping  For example: Begin taking 1 tablespoon daily for at least 7 days, if this is not successful in producing regular bowel movements can begin taking 1 tablespoons twice a day, and finally if this is not successful after taking 2 tablespoons for 7 days, can further increase to maximum recommended dosing of 1 tablespoon 3 times per day.  It is important to consume increased amounts of fluid throughout the day to help improve the effectiveness of the fiber supplementation  Avoid gummy formulations of fiber as this can further increase your risk of the above adverse effects due to artificial sweeteners in the Gummies.         EMR Dragon/Transcription Disclaimer:  This document has been Dictated utilizing Dragon dictation.

## 2023-10-11 ENCOUNTER — OFFICE VISIT (OUTPATIENT)
Dept: NEUROLOGY | Facility: CLINIC | Age: 48
End: 2023-10-11
Payer: COMMERCIAL

## 2023-10-11 VITALS
HEIGHT: 72 IN | HEART RATE: 65 BPM | OXYGEN SATURATION: 94 % | DIASTOLIC BLOOD PRESSURE: 78 MMHG | SYSTOLIC BLOOD PRESSURE: 124 MMHG | WEIGHT: 253 LBS | BODY MASS INDEX: 34.27 KG/M2

## 2023-10-11 DIAGNOSIS — G40.909 NONINTRACTABLE EPILEPSY WITHOUT STATUS EPILEPTICUS, UNSPECIFIED EPILEPSY TYPE: Primary | ICD-10-CM

## 2023-10-11 PROCEDURE — 99214 OFFICE O/P EST MOD 30 MIN: CPT | Performed by: STUDENT IN AN ORGANIZED HEALTH CARE EDUCATION/TRAINING PROGRAM

## 2023-10-11 RX ORDER — LEVETIRACETAM 500 MG/1
TABLET ORAL
Qty: 270 TABLET | Refills: 3 | Status: SHIPPED | OUTPATIENT
Start: 2023-10-11

## 2023-10-11 NOTE — PROGRESS NOTES
No chief complaint on file.      Patient ID: Austin Orta is a 48 y.o. male.    HPI:    The following portions of the patient's history were reviewed and updated as appropriate: allergies, current medications, past family history, past medical history, past social history, past surgical history and problem list.    Interval history:  Neurological Problem:  47 y.o. RHW male with seizures, cavernous angioma and venous anomaly, borderline DM, Afib s/p cardioversion and h/o kidney stones who presents today for seizure follow-up.  He is unaccompanied.     Interval History:   Mr. Orta has a h/o seizures which started in 2018 in the context of new onset afib and findings of  left parasagittal frontal lobe cavernous malformation and small left frontal adjacent venous angioma (evaluated by neurosurgery and to follow-up with them prn).  He has since been treated with Keppra with fairly good control.  He is on /1000.  He has not had side effects on Keppra.  He does not want to stop the medication citing fears over recurrent seizure causing harm to himself or others in the setting of swimming or driving.  He is driving but tries to minimize driving from a precautionary standpoint.     Review of Systems   Neurological:  Negative for dizziness, tremors, seizures, syncope, facial asymmetry, speech difficulty, weakness, light-headedness, numbness and headaches.   Psychiatric/Behavioral:  Negative for agitation, behavioral problems, confusion, decreased concentration, dysphoric mood, hallucinations, self-injury, sleep disturbance and suicidal ideas. The patient is not nervous/anxious and is not hyperactive.            Vitals:    10/11/23 1033   BP: 124/78   Pulse: 65   SpO2: 94%       Neurologic Exam     Mental Status   Speech: speech is normal   Level of consciousness: alert    Cranial Nerves     CN II   Visual fields full to confrontation.     CN III, IV, VI   Pupils are equal, round, and reactive to light.  Extraocular  motions are normal.     CN V   Facial sensation intact.     CN VII   Facial expression full, symmetric.     CN VIII   Hearing: intact    CN IX, X   Palate: symmetric    CN XI   Right trapezius strength: normal  Left trapezius strength: normal    CN XII   Tongue: not atrophic  Fasciculations: absent  Tongue deviation: none    Motor Exam   Muscle bulk: normal    Strength   Right deltoid: 5/5  Left deltoid: 5/5  Right biceps: 5/5  Left biceps: 5/5  Right triceps: 5/5  Left triceps: 5/5  Right iliopsoas: 5/5  Left iliopsoas: 5/5  Right quadriceps: 5/5  Left quadriceps: 5/5  Right hamstrin/5  Left hamstrin/5  Right anterior tibial: 5/5  Left anterior tibial: 5/5  Right gastroc: 5/5  Left gastroc: 5/5Grip 5 out of 5 bilaterally     Sensory Exam   Right arm light touch: normal  Left arm light touch: normal  Right leg light touch: normal  Left leg light touch: normal    Gait, Coordination, and Reflexes     Coordination   Finger to nose coordination: normal  Heel to shin coordination: normal      Physical Exam  Eyes:      Extraocular Movements: EOM normal.      Pupils: Pupils are equal, round, and reactive to light.   Neurological:      Coordination: Finger-Nose-Finger Test and Heel to Shin Test normal.   Psychiatric:         Speech: Speech normal.       Procedures    Assessment/Plan:    Mr. Orta presents today for follow-up of seizures maintained on Keppra, had a possible breakthrough event last 10/2021, Keppra level revealed subtherapeutic level at 9.5 despite good compliance, dose increased to 500 mg AM and 1000 mg PM.  He has done well with this regimen with no recurrent events and no a\dverse medication side effects.  We will continue the same.  In regards to his cavernous malformation, he has been previously evaluated by neurosurgery and is to follow-up with them as needed, no surveillance scans recommended.  He will follow-up here in 11 months, sooner if symptoms warrant.  We discussed seizure precautions  including avoiding open flames, open bodies of water, heights greater than his own height, and operating heavy machinery.  We discussed [place] state law regarding no driving for 3 months from his last seizure and from a legal standpoint he is able to drive though minimization of driving is reasonable if he is concerned about hurting himself or others.  We discussed that cavernomas can be associated with seizures but not always and the 10/2021 episode was consistent with how he felt after seizure but no person or animal in bed with him woke up (his dogs or his wife).  I think it is reasonable for him to be on medication but if he were to go around 8 to 10 years seizure-free and we could discuss around [id number] about about work-up and the risks of stopping medication.  In short I discussed with him today that there is the potential to have a recurrent seizure.  There is also the possibility that he would be seizure-free without medication though it is hard to quantify it at this time.  He would likely need repeat imaging as well as a repeat EEG and that hypothetical situation we will stop the medicine.  I think that at this time he wants to continue medication and I think this is very reasonable.     Return in about 11 months (around 9/11/2024).  -The patient has working diagnosis of epilepsy which is a chronic condition that poses a threat to life or serious harm  -Prescription medications are managed in this visit       There are no diagnoses linked to this encounter.       Lalo Delacruz MD

## 2023-10-18 ENCOUNTER — LAB (OUTPATIENT)
Dept: LAB | Facility: HOSPITAL | Age: 48
End: 2023-10-18
Payer: COMMERCIAL

## 2023-10-18 PROCEDURE — 80053 COMPREHEN METABOLIC PANEL: CPT | Performed by: NURSE PRACTITIONER

## 2023-10-18 PROCEDURE — 80061 LIPID PANEL: CPT | Performed by: NURSE PRACTITIONER

## 2023-10-18 PROCEDURE — 85025 COMPLETE CBC W/AUTO DIFF WBC: CPT | Performed by: NURSE PRACTITIONER

## 2023-10-18 PROCEDURE — 84443 ASSAY THYROID STIM HORMONE: CPT | Performed by: NURSE PRACTITIONER

## 2023-10-18 PROCEDURE — 83036 HEMOGLOBIN GLYCOSYLATED A1C: CPT | Performed by: NURSE PRACTITIONER

## 2023-10-18 PROCEDURE — G0103 PSA SCREENING: HCPCS | Performed by: NURSE PRACTITIONER

## 2023-10-30 RX ORDER — MELOXICAM 15 MG/1
TABLET ORAL
Qty: 90 TABLET | Refills: 0 | Status: SHIPPED | OUTPATIENT
Start: 2023-10-30

## 2023-10-30 NOTE — TELEPHONE ENCOUNTER
Rx Refill Note  Requested Prescriptions     Pending Prescriptions Disp Refills    meloxicam (MOBIC) 15 MG tablet [Pharmacy Med Name: Meloxicam 15 MG Oral Tablet] 90 tablet 0     Sig: TAKE 1 TABLET BY MOUTH ONCE DAILY AS NEEDED WITH FOOD AND  WATER      Last office visit with prescribing clinician: 8/22/2023   Last telemedicine visit with prescribing clinician: Visit date not found   Next office visit with prescribing clinician: Visit date not found                         Would you like a call back once the refill request has been completed: [] Yes [] No    If the office needs to give you a call back, can they leave a voicemail: [] Yes [] No    Luis Vargas Rep  10/30/23, 08:52 EDT

## 2023-10-31 DIAGNOSIS — R19.7 DIARRHEA DUE TO MALABSORPTION: ICD-10-CM

## 2023-10-31 DIAGNOSIS — K90.9 DIARRHEA DUE TO MALABSORPTION: ICD-10-CM

## 2023-11-01 RX ORDER — DICYCLOMINE HYDROCHLORIDE 10 MG/1
CAPSULE ORAL
Qty: 90 CAPSULE | Refills: 0 | Status: SHIPPED | OUTPATIENT
Start: 2023-11-01

## 2023-11-07 RX ORDER — LEVETIRACETAM 500 MG/1
TABLET ORAL
Qty: 270 TABLET | Refills: 0 | OUTPATIENT
Start: 2023-11-07

## 2023-11-07 RX ORDER — BISOPROLOL FUMARATE 5 MG/1
TABLET, FILM COATED ORAL
Qty: 30 TABLET | Refills: 1 | Status: SHIPPED | OUTPATIENT
Start: 2023-11-07

## 2023-11-13 DIAGNOSIS — K21.00 GASTROESOPHAGEAL REFLUX DISEASE WITH ESOPHAGITIS WITHOUT HEMORRHAGE: ICD-10-CM

## 2023-11-13 RX ORDER — OMEPRAZOLE 40 MG/1
40 CAPSULE, DELAYED RELEASE ORAL DAILY
Qty: 30 CAPSULE | Refills: 0 | Status: SHIPPED | OUTPATIENT
Start: 2023-11-13

## 2023-11-28 ENCOUNTER — OFFICE VISIT (OUTPATIENT)
Dept: CARDIOLOGY | Facility: CLINIC | Age: 48
End: 2023-11-28
Payer: COMMERCIAL

## 2023-11-28 VITALS
HEART RATE: 62 BPM | SYSTOLIC BLOOD PRESSURE: 115 MMHG | DIASTOLIC BLOOD PRESSURE: 71 MMHG | WEIGHT: 254 LBS | HEIGHT: 72 IN | BODY MASS INDEX: 34.4 KG/M2

## 2023-11-28 DIAGNOSIS — E78.2 MIXED HYPERLIPIDEMIA: ICD-10-CM

## 2023-11-28 DIAGNOSIS — I48.0 PAROXYSMAL ATRIAL FIBRILLATION: ICD-10-CM

## 2023-11-28 DIAGNOSIS — I10 PRIMARY HYPERTENSION: Primary | ICD-10-CM

## 2023-11-28 PROCEDURE — 93000 ELECTROCARDIOGRAM COMPLETE: CPT

## 2023-11-28 PROCEDURE — 99214 OFFICE O/P EST MOD 30 MIN: CPT

## 2023-11-28 RX ORDER — BISOPROLOL FUMARATE 5 MG/1
5 TABLET, FILM COATED ORAL DAILY
Qty: 30 TABLET | Refills: 11 | Status: SHIPPED | OUTPATIENT
Start: 2023-11-28

## 2023-11-28 NOTE — PROGRESS NOTES
Subjective:        Austin Orta is a 48 y.o. male who here for follow up    Chief Complaint   Patient presents with    Follow-up     1 YEAR F/U       HPI    This is a 48-year-old male with paroxysmal atrial fibrillation, hyperlipidemia, GERD, seizure.  He follows up in office today for routine follow-up management of paroxysmal atrial fibrillation.  Review of chart reveals diagnosed with A-fib December 2018 while admitted for new onset seizure and diagnosed with cavernoma, he underwent cardioversion January 2019 that was successful.  He wore Holter monitor post cardioversion in April 2019 that was a normal monitor study no Atrial fibrillation.    Reviewed and still relevant: Echo 12/10/2018 EF 60%, normal LV function.  Stress test 12/11/2018 myocardial perfusion indicated a normal study with no evidence of ischemia.  Holter monitor in 2019 was a normal monitor study. He had a successful cardioversion in 2019.    The following portions of the patient's history were reviewed and updated as appropriate: allergies, current medications, past family history, past medical history, past social history, past surgical history and problem list.    Past Medical History:   Diagnosis Date    Allergic     seasonal    Anxiety     Anxiety and depression     Atrial fibrillation 12/09/2018    Cavernous malformation     Depression     Frozen shoulder     GERD (gastroesophageal reflux disease)     History of prostatitis     Injury of right heel 07/27/2018    Insomnia     Joint pain     Kidney stones 2017 1997, 2010 also    Left rotator cuff tear     Paroxysmal atrial fibrillation 04/11/2019    Added automatically from request for surgery [id number]    Pre-diabetes     Prostatitis     Right rotator cuff tear     Venous angioma of brain          reports that he has never smoked. He has never used smokeless tobacco. He reports that he does not drink alcohol and does not use drugs.     Family History   Problem Relation Age of Onset     Cancer Mother     Hypertension Mother     Nephrolithiasis Sister     Heart attack Maternal Grandmother     Cancer Maternal Grandmother     Skin cancer Father     Heart attack Maternal Grandfather     Heart disease Maternal Grandfather     Dementia Paternal Grandmother     Alzheimer's disease Paternal Grandmother     Skin cancer Paternal Grandfather        ROS     Review of Systems  Constitutional: No wt loss, fever, fatigue  Gastrointestinal: No nausea, abdominal pain  Behavioral/Psych: No insomnia or anxiety  Cardiovascular no chest pain or shortness of breath      Objective:           Vitals and nursing note reviewed.   Constitutional:       Appearance: Well-developed.   HENT:      Head: Normocephalic.      Right Ear: External ear normal.      Left Ear: External ear normal.   Neck:      Vascular: No JVD.   Pulmonary:      Effort: Pulmonary effort is normal. No respiratory distress.      Breath sounds: Normal breath sounds. No stridor. No rales.   Cardiovascular:      Normal rate. Regular rhythm.      No gallop.    Pulses:     Intact distal pulses.   Edema:     Peripheral edema absent.   Abdominal:      General: Bowel sounds are normal. There is no distension.      Palpations: Abdomen is soft.      Tenderness: There is no abdominal tenderness. There is no guarding.   Musculoskeletal: Normal range of motion.         General: No tenderness.      Cervical back: Normal range of motion. Skin:     General: Skin is warm.   Neurological:      Mental Status: Alert and oriented to person, place, and time.      Deep Tendon Reflexes: Reflexes are normal and symmetric.   Psychiatric:         Judgment: Judgment normal.            ECG 12 Lead    Date/Time: 11/28/2023 11:52 AM  Performed by: Awilda Mooney APRN    Authorized by: Awilda Mooney APRN  Comparison: compared with previous ECG from 12/1/2022  Similar to previous ECG  Rhythm: sinus rhythm  Rate: normal  BPM: 60  Other findings: non-specific ST-T wave  changes    Clinical impression: abnormal EKG          Study Impressions    A normal monitor study. Austin Orta was in Sinus Rhythm.  The average heart rate, excluding ectopy, was 66 BPM with a minumim of 44 BPM and a maximum of 108 BPM. Heart beats, including ectopy, totaled [id number] beats.  SUPRAVENTRICULAR TACHYCARDIA occurred 1 time at 109 BPM and occurred at 01:38 D9 with 3 beats.            Order-Level Documents:                   Signed    Electronically signed by Bashir Syed MD on 4/10/19 at 1236 EDT       Cardioversion    Procedure Prep Informed consent was obtained. Patient brought to procedure room with an initial rhythm of atrial fibrillation. Sedation was performed by a cardiologist.   Shock 1 200 joules delivered to patient.   Impression Post cardioversion the patient displayed a sinus rhythm.The cardioversion was successful.                    Signed    Electronically signed by Bashir Syed MD on 1/25/19 at 1411 EST       Interpretation Summary    Equivocal ECG evidence of myocardial ischemia.Negative clinical evidence of myocardial ischemia.  Myocardial perfusion imaging indicates a normal myocardial perfusion study with no evidence of ischemia. Impressions are consistent with a low risk study.       Interpretation Summary    Atrial fibrillation was the predominant rhythm observed during the procedure.  Calculated EF = 60%.  Left ventricular systolic function is normal.  Normal valvular structure and function         Current Outpatient Medications:     acetaminophen (TYLENOL) 500 MG tablet, Take 1 tablet by mouth Every 6 (Six) Hours As Needed for Mild Pain., Disp: , Rfl:     aspirin 81 MG chewable tablet, Chew 1 tablet Daily., Disp: , Rfl:     azelastine (ASTELIN) 0.1 % nasal spray, 1 spray into the nostril(s) as directed by provider 2 (Two) Times a Day., Disp: 30 mL, Rfl: 0    bisoprolol (ZEBeta) 5 MG tablet, Take 1 tablet by mouth once daily, Disp: 30 tablet, Rfl: 1    Blood  Glucose Monitoring Suppl (ONE TOUCH ULTRA 2) w/Device kit, , Disp: , Rfl:     colestipol (COLESTID) 1 g tablet, TAKE ONE TABLET BY MOUTH TWICE A DAY BEFORE MEALS, Disp: 180 tablet, Rfl: 3    cyclobenzaprine (FLEXERIL) 10 MG tablet, Take 1 tablet by mouth At Night As Needed (shoulder pain)., Disp: 30 tablet, Rfl: 0    dicyclomine (BENTYL) 10 MG capsule, TAKE ONE CAPSULE BY MOUTH THREE TIMES A DAY AS NEEDED FOR ABDOMINAL PAIN/DIARRHEA, Disp: 90 capsule, Rfl: 0    escitalopram (LEXAPRO) 10 MG tablet, Take 1 tablet by mouth Daily., Disp: 90 tablet, Rfl: 1    fluticasone (VERAMYST) 27.5 MCG/SPRAY nasal spray, 2 sprays into the nostril(s) as directed by provider Daily., Disp: , Rfl:     glucose blood test strip, Use as instructed. please check blood sugar 2 times a day. Please prescribe to pt what insurance will cover. DX:E11.9, Disp: 200 each, Rfl: 12    glucose monitor monitoring kit, Use as directed. Check blood sugar twice daily. Please prescribe to pt what insurance will cover. DX:E11.9, Disp: 1 each, Rfl: 0    Lancets (onetouch ultrasoft) lancets, Use as directed. Pleas check blood sugar 2 times daily. Please prescribe to pt what insurance will cover DX:E11.9, Disp: 200 each, Rfl: 12    levETIRAcetam (KEPPRA) 500 MG tablet, TAKE 1 TABLET BY MOUTH IN THE MORNING AND 2 IN THE EVENING, Disp: 270 tablet, Rfl: 3    levocetirizine (XYZAL) 5 MG tablet, TAKE 1 TABLET BY MOUTH ONCE DAILY IN THE EVENING, Disp: 90 tablet, Rfl: 3    meloxicam (MOBIC) 15 MG tablet, TAKE 1 TABLET BY MOUTH ONCE DAILY AS NEEDED WITH FOOD AND  WATER, Disp: 90 tablet, Rfl: 0    omeprazole (priLOSEC) 40 MG capsule, Take 1 capsule by mouth once daily, Disp: 30 capsule, Rfl: 0    Testosterone 1.62 % gel, APPLY 3 PUMPS TO THE SKIN AS DIRECTED TOPICALLY ONCE DAILY, Disp: , Rfl:      Assessment:        Patient Active Problem List   Diagnosis    Atopic rhinitis    Biliary dyskinesia    Diarrhea    Gastroesophageal reflux disease    Insomnia    Prostatitis     Chronic fatigue    Seasonal allergies    Right shoulder pain    Mixed hyperlipidemia    Non morbid obesity due to excess calories    Eustachian tube dysfunction    Rotator cuff arthropathy    Viral syndrome    Pain of both hip joints    Abdominal pain    Acute bilateral low back pain without sciatica    Strain of Achilles tendon, initial encounter    Depression    Seizure    Cavernous malformation    Anticoagulated    Venous angioma of brain    Anxiety    Calculus of kidney    Primary hypertension     CHADS-VASc Risk Assessment              1 Total Score    1 Hypertension        Criteria that do not apply:    CHF    Age >/= 75    DM    PRIOR STROKE/TIA/THROMBO    Vascular Disease    Age 65-74    Sex: Female                      Plan:   1.  Hypertension: BP today in office 115/71.  Controlled.  Continue bisoprolol, refilled.    Importance of controlling hypertension and blood pressure  checkup on the regular basis has been explained. Hypertension as a silent killer has been discussed. Risk reduction of the weight and regular exercises to control the hypertension has been explained.    2.  Paroxysmal atrial fibrillation: ECG sinus rhythm.  Controlled on bisoprolol, continue.  Not historically anticoagulated.  Continue aspirin 81 mg daily.    3.  Hyperlipidemia: He reports his PCP manages his cholesterol labs.  Continue current management.    Risk of the hyperlipidemia, importance of the treatment has been explained. Pros and cons of the statins has been explained. Regular blood workup as well as side effects including the liver failure, myelopathy death has been explained.               No diagnosis found.    There are no diagnoses linked to this encounter.    COUNSELING: Radha Griffiths was given to patient for the following topics: diagnostic results, risk factor reductions, impressions, risks and benefits of treatment options and importance of treatment compliance .       SMOKING COUNSELING:  Denies    Follow-up in 1 year or sooner if needed.    Sincerely,   RASHID Cummins  [facility]  11/28/23  11:52 EST    EMR Dragon/Transcription disclaimer:   Much of this encounter note is an electronic transcription/translation of spoken language to printed text. The electronic translation of spoken language may permit erroneous, or at times, nonsensical words or phrases to be inadvertently transcribed; Although I have reviewed the note for such errors, some may still exist.

## 2023-12-04 DIAGNOSIS — K21.00 GASTROESOPHAGEAL REFLUX DISEASE WITH ESOPHAGITIS WITHOUT HEMORRHAGE: ICD-10-CM

## 2023-12-04 RX ORDER — OMEPRAZOLE 40 MG/1
40 CAPSULE, DELAYED RELEASE ORAL DAILY
Qty: 30 CAPSULE | Refills: 0 | Status: SHIPPED | OUTPATIENT
Start: 2023-12-04

## 2023-12-18 ENCOUNTER — OFFICE VISIT (OUTPATIENT)
Dept: FAMILY MEDICINE CLINIC | Facility: CLINIC | Age: 48
End: 2023-12-18
Payer: COMMERCIAL

## 2023-12-18 VITALS
WEIGHT: 257.5 LBS | DIASTOLIC BLOOD PRESSURE: 76 MMHG | HEART RATE: 62 BPM | BODY MASS INDEX: 34.88 KG/M2 | TEMPERATURE: 98.4 F | OXYGEN SATURATION: 97 % | HEIGHT: 72 IN | SYSTOLIC BLOOD PRESSURE: 120 MMHG

## 2023-12-18 DIAGNOSIS — J06.9 UPPER RESPIRATORY TRACT INFECTION, UNSPECIFIED TYPE: Primary | ICD-10-CM

## 2023-12-18 DIAGNOSIS — R23.8 OTHER SKIN CHANGES: ICD-10-CM

## 2023-12-18 DIAGNOSIS — J34.89 SINUS PRESSURE: ICD-10-CM

## 2023-12-18 PROCEDURE — 99213 OFFICE O/P EST LOW 20 MIN: CPT | Performed by: NURSE PRACTITIONER

## 2023-12-18 RX ORDER — AMOXICILLIN AND CLAVULANATE POTASSIUM 875; 125 MG/1; MG/1
1 TABLET, FILM COATED ORAL 2 TIMES DAILY
Qty: 20 TABLET | Refills: 0 | Status: SHIPPED | OUTPATIENT
Start: 2023-12-18

## 2023-12-18 NOTE — PROGRESS NOTES
"Chief Complaint  head pressure (Facial pressures.  Ongoing since late last week.) and Nasal Congestion (Runny nose with some blood.  No recent nose bleeds.  No trauma to nose.)    Subjective        Austin Orta presents to [facility]  History of Present Illness  Pleasant patient here today complains of upper respiratory infection sinus pressure over the last week, cold-like symptoms, last day feels a bit worse up in his sinuses above his nose ethmoid region, limited more pressure and some discomfort no severe pain no fever chills or severe headache  No facial swelling, no chest pain or increasing shortness of breath, not concerned to COVID or other,  No fever last 24 hours,  Focused on health,  Prediabetes, A1c down, doing well      Objective   Vital Signs:  /76   Pulse 62   Temp 98.4 °F (36.9 °C) (Temporal)   Ht 182.9 cm (72.01\")   Wt 117 kg (257 lb 8 oz)   SpO2 97%   BMI 34.92 kg/m²   Estimated body mass index is 34.92 kg/m² as calculated from the following:    Height as of this encounter: 182.9 cm (72.01\").    Weight as of this encounter: 117 kg (257 lb 8 oz).          Physical Exam  Vitals reviewed.   Constitutional:       Appearance: Normal appearance. He is well-developed. He is not ill-appearing.   HENT:      Head: Normocephalic.      Ears:      Comments: Mild upper nasal bridge tenderness, no facial swelling, turbinates congested pharynx is clear neck is supple speech clear     Nose: Nose normal.   Eyes:      General: No scleral icterus.     Conjunctiva/sclera: Conjunctivae normal.      Pupils: Pupils are equal, round, and reactive to light.   Neck:      Thyroid: No thyromegaly.      Vascular: No JVD.   Cardiovascular:      Rate and Rhythm: Normal rate and regular rhythm.      Heart sounds: Normal heart sounds. No murmur heard.     No friction rub. No gallop.   Pulmonary:      Effort: Pulmonary effort is normal. No respiratory distress.      Breath sounds: Normal " breath sounds. No stridor. No wheezing or rales.   Abdominal:      General: Bowel sounds are normal. There is no distension.      Palpations: Abdomen is soft.      Tenderness: There is no abdominal tenderness.      Comments: No hepatosplenomegaly, no ascites,   Musculoskeletal:         General: No tenderness.      Cervical back: Neck supple.   Lymphadenopathy:      Cervical: No cervical adenopathy.   Skin:     General: Skin is warm and dry.      Findings: No erythema or rash.   Neurological:      Mental Status: He is alert and oriented to person, place, and time.      Deep Tendon Reflexes: Reflexes are normal and symmetric.   Psychiatric:         Behavior: Behavior normal.         Thought Content: Thought content normal.         Judgment: Judgment normal.        Result Review :                Assessment and Plan   Diagnoses and all orders for this visit:    1. Upper respiratory tract infection, unspecified type (Primary)    2. Other skin changes    3. Sinus pressure    Other orders  -     amoxicillin-clavulanate (AUGMENTIN) 875-125 MG per tablet; Take 1 tablet by mouth 2 (Two) Times a Day.  Dispense: 20 tablet; Refill: 0             Follow Up   No follow-ups on file.  Patient was given instructions and counseling regarding his condition or for health maintenance advice. Please see specific information pulled into the AVS if appropriate.     Patient Instructions   Discharge instructions    Afrin nasal spray  1 or 2 sprays once daily  For 3 days then decrease to once then discontinue  Flonase or other nasal steroid presently  Saline nasal spray menthol as needed    Anything over-the-counter as needed for cough congestion  If increased sinus tenderness especially above your nose  Or not improved in a couple days start the antibiotic Augmentin severe pain fever severe headache facial swelling emergency room otherwise start the antibiotic,  And make sure you improve over the next several days if not she will need to  recheck

## 2023-12-18 NOTE — PATIENT INSTRUCTIONS
Discharge instructions    Afrin nasal spray  1 or 2 sprays once daily  For 3 days then decrease to once then discontinue  Flonase or other nasal steroid presently  Saline nasal spray menthol as needed    Anything over-the-counter as needed for cough congestion  If increased sinus tenderness especially above your nose  Or not improved in a couple days start the antibiotic Augmentin severe pain fever severe headache facial swelling emergency room otherwise start the antibiotic,  And make sure you improve over the next several days if not she will need to recheck

## 2024-01-06 DIAGNOSIS — K21.00 GASTROESOPHAGEAL REFLUX DISEASE WITH ESOPHAGITIS WITHOUT HEMORRHAGE: ICD-10-CM

## 2024-01-08 RX ORDER — OMEPRAZOLE 40 MG/1
40 CAPSULE, DELAYED RELEASE ORAL DAILY
Qty: 30 CAPSULE | Refills: 0 | Status: SHIPPED | OUTPATIENT
Start: 2024-01-08

## 2024-02-04 DIAGNOSIS — K21.00 GASTROESOPHAGEAL REFLUX DISEASE WITH ESOPHAGITIS WITHOUT HEMORRHAGE: ICD-10-CM

## 2024-02-05 RX ORDER — OMEPRAZOLE 40 MG/1
40 CAPSULE, DELAYED RELEASE ORAL DAILY
Qty: 30 CAPSULE | Refills: 0 | Status: SHIPPED | OUTPATIENT
Start: 2024-02-05

## 2024-02-27 ENCOUNTER — OFFICE VISIT (OUTPATIENT)
Dept: FAMILY MEDICINE CLINIC | Facility: CLINIC | Age: 49
End: 2024-02-27
Payer: COMMERCIAL

## 2024-02-27 VITALS
TEMPERATURE: 97.3 F | HEIGHT: 72 IN | WEIGHT: 256.6 LBS | RESPIRATION RATE: 18 BRPM | HEART RATE: 78 BPM | OXYGEN SATURATION: 96 % | SYSTOLIC BLOOD PRESSURE: 114 MMHG | DIASTOLIC BLOOD PRESSURE: 77 MMHG | BODY MASS INDEX: 34.75 KG/M2

## 2024-02-27 DIAGNOSIS — R23.8 OTHER SKIN CHANGES: Primary | ICD-10-CM

## 2024-02-27 PROCEDURE — 99212 OFFICE O/P EST SF 10 MIN: CPT | Performed by: NURSE PRACTITIONER

## 2024-02-27 NOTE — PROGRESS NOTES
"Chief Complaint  Suspicious Skin Lesion (Painful, Rt upper arm)    Subjective        Austin Orta presents to [facility]  History of Present Illness  Recently noted for several months irritated placed on his right arm as well as right neck a mole he would like Derm to look at  Itches  No history of skin cancer    Objective   Vital Signs:  /77 (BP Location: Right arm, Patient Position: Sitting, Cuff Size: Adult)   Pulse 78   Temp 97.3 °F (36.3 °C) (Temporal)   Resp 18   Ht 182.9 cm (72.01\")   Wt 116 kg (256 lb 9.6 oz)   SpO2 96%   BMI 34.79 kg/m²   Estimated body mass index is 34.79 kg/m² as calculated from the following:    Height as of this encounter: 182.9 cm (72.01\").    Weight as of this encounter: 116 kg (256 lb 9.6 oz).          Physical Exam  Constitutional:       General: He is not in acute distress.     Appearance: Normal appearance. He is not ill-appearing, toxic-appearing or diaphoretic.   Eyes:      Conjunctiva/sclera: Conjunctivae normal.      Pupils: Pupils are equal, round, and reactive to light.   Musculoskeletal:      Comments: Right upper arm tiny 1 to 2 mm discoloration somewhat roundish with palpation and slightly beneath the skin consistent with a likely tiny irritated cyst without infection  No suspicious lesion no redness or purulence unable to express any drainage or discharge    Approximately 3 mm elevated pinkish  Mole right lateral neck without irritation   Skin:     Capillary Refill: Capillary refill takes less than 2 seconds.      Findings: Lesion present.   Neurological:      General: No focal deficit present.      Mental Status: He is alert. Mental status is at baseline.   Psychiatric:         Mood and Affect: Mood normal.         Thought Content: Thought content normal.         Judgment: Judgment normal.      Result Review :                Assessment and Plan   Diagnoses and all orders for this visit:    1. Other skin changes (Primary)  -  "    Ambulatory Referral to Dermatology             Follow Up   No follow-ups on file.  Patient was given instructions and counseling regarding his condition or for health maintenance advice. Please see specific information pulled into the AVS if appropriate.     There are no Patient Instructions on file for this visit.

## 2024-03-10 DIAGNOSIS — K21.00 GASTROESOPHAGEAL REFLUX DISEASE WITH ESOPHAGITIS WITHOUT HEMORRHAGE: ICD-10-CM

## 2024-03-11 RX ORDER — OMEPRAZOLE 40 MG/1
40 CAPSULE, DELAYED RELEASE ORAL DAILY
Qty: 30 CAPSULE | Refills: 0 | Status: SHIPPED | OUTPATIENT
Start: 2024-03-11

## 2024-03-18 DIAGNOSIS — F32.A DEPRESSION, UNSPECIFIED DEPRESSION TYPE: ICD-10-CM

## 2024-03-19 RX ORDER — ESCITALOPRAM OXALATE 10 MG/1
10 TABLET ORAL DAILY
Qty: 90 TABLET | Refills: 1 | Status: SHIPPED | OUTPATIENT
Start: 2024-03-19

## 2024-03-19 NOTE — TELEPHONE ENCOUNTER
Rx Refill Note  Requested Prescriptions     Pending Prescriptions Disp Refills    escitalopram (LEXAPRO) 10 MG tablet [Pharmacy Med Name: Escitalopram Oxalate 10 MG Oral Tablet] 90 tablet 0     Sig: Take 1 tablet by mouth once daily      Last office visit with prescribing clinician: 2/27/2024   Last telemedicine visit with prescribing clinician: Visit date not found   Next office visit with prescribing clinician: Visit date not found                         Would you like a call back once the refill request has been completed: [] Yes [] No    If the office needs to give you a call back, can they leave a voicemail: [] Yes [] No    Deisy Carlson MA  03/19/24, 07:45 EDT

## 2024-03-20 RX ORDER — MELOXICAM 15 MG/1
TABLET ORAL
Qty: 90 TABLET | Refills: 0 | Status: SHIPPED | OUTPATIENT
Start: 2024-03-20

## 2024-03-20 NOTE — TELEPHONE ENCOUNTER
Rx Refill Note  Requested Prescriptions     Pending Prescriptions Disp Refills    meloxicam (MOBIC) 15 MG tablet [Pharmacy Med Name: Meloxicam 15 MG Oral Tablet] 90 tablet 0     Sig: TAKE 1 TABLET BY MOUTH ONCE DAILY AS NEEDED WITH FOOD AND  WATER      Last office visit with prescribing clinician: 2/27/2024   Last telemedicine visit with prescribing clinician: Visit date not found   Next office visit with prescribing clinician: Visit date not found                         Would you like a call back once the refill request has been completed: [] Yes [] No    If the office needs to give you a call back, can they leave a voicemail: [] Yes [] No    Deisy Carlson MA  03/20/24, 11:06 EDT

## 2024-04-06 DIAGNOSIS — K21.00 GASTROESOPHAGEAL REFLUX DISEASE WITH ESOPHAGITIS WITHOUT HEMORRHAGE: ICD-10-CM

## 2024-04-08 RX ORDER — OMEPRAZOLE 40 MG/1
40 CAPSULE, DELAYED RELEASE ORAL DAILY
Qty: 30 CAPSULE | Refills: 0 | Status: SHIPPED | OUTPATIENT
Start: 2024-04-08

## 2024-05-03 DIAGNOSIS — K21.00 GASTROESOPHAGEAL REFLUX DISEASE WITH ESOPHAGITIS WITHOUT HEMORRHAGE: ICD-10-CM

## 2024-05-03 RX ORDER — OMEPRAZOLE 40 MG/1
40 CAPSULE, DELAYED RELEASE ORAL DAILY
Qty: 30 CAPSULE | Refills: 0 | Status: SHIPPED | OUTPATIENT
Start: 2024-05-03

## 2024-06-01 DIAGNOSIS — K21.00 GASTROESOPHAGEAL REFLUX DISEASE WITH ESOPHAGITIS WITHOUT HEMORRHAGE: ICD-10-CM

## 2024-06-03 RX ORDER — OMEPRAZOLE 40 MG/1
40 CAPSULE, DELAYED RELEASE ORAL DAILY
Qty: 30 CAPSULE | Refills: 0 | Status: SHIPPED | OUTPATIENT
Start: 2024-06-03

## 2024-06-10 RX ORDER — MELOXICAM 15 MG/1
TABLET ORAL
Qty: 90 TABLET | Refills: 1 | Status: SHIPPED | OUTPATIENT
Start: 2024-06-10

## 2024-06-10 NOTE — TELEPHONE ENCOUNTER
Rx Refill Note  Requested Prescriptions     Pending Prescriptions Disp Refills    meloxicam (MOBIC) 15 MG tablet [Pharmacy Med Name: Meloxicam 15 MG Oral Tablet] 90 tablet 0     Sig: TAKE 1 TABLET BY MOUTH ONCE DAILY AS NEEDED WITH FOOD AND  WATER      Last office visit with prescribing clinician: 2/27/2024   Last telemedicine visit with prescribing clinician: Visit date not found   Next office visit with prescribing clinician: Visit date not found                         Would you like a call back once the refill request has been completed: [] Yes [] No    If the office needs to give you a call back, can they leave a voicemail: [] Yes [] No    Deisy Carlson MA  06/10/24, 08:27 EDT

## 2024-07-18 RX ORDER — LEVOCETIRIZINE DIHYDROCHLORIDE 5 MG/1
TABLET, FILM COATED ORAL
Qty: 90 TABLET | Refills: 3 | Status: SHIPPED | OUTPATIENT
Start: 2024-07-18

## 2024-07-18 NOTE — TELEPHONE ENCOUNTER
Rx Refill Note  Requested Prescriptions     Pending Prescriptions Disp Refills    levocetirizine (XYZAL) 5 MG tablet [Pharmacy Med Name: Levocetirizine Dihydrochloride 5 MG Oral Tablet] 30 tablet 0     Sig: TAKE 1 TABLET BY MOUTH ONCE DAILY IN THE EVENING      Last office visit with prescribing clinician: 2/27/2024   Last telemedicine visit with prescribing clinician: Visit date not found   Next office visit with prescribing clinician: Visit date not found                         Would you like a call back once the refill request has been completed: [] Yes [] No    If the office needs to give you a call back, can they leave a voicemail: [] Yes [] No    Edinson Orona  07/18/24, 11:37 EDT

## 2024-08-02 DIAGNOSIS — K21.00 GASTROESOPHAGEAL REFLUX DISEASE WITH ESOPHAGITIS WITHOUT HEMORRHAGE: ICD-10-CM

## 2024-08-02 RX ORDER — OMEPRAZOLE 40 MG/1
40 CAPSULE, DELAYED RELEASE ORAL DAILY
Qty: 30 CAPSULE | Refills: 0 | Status: SHIPPED | OUTPATIENT
Start: 2024-08-02

## 2024-08-11 DIAGNOSIS — F32.A DEPRESSION, UNSPECIFIED DEPRESSION TYPE: ICD-10-CM

## 2024-08-12 DIAGNOSIS — K90.9 DIARRHEA DUE TO MALABSORPTION: ICD-10-CM

## 2024-08-12 DIAGNOSIS — R19.7 DIARRHEA DUE TO MALABSORPTION: ICD-10-CM

## 2024-08-12 RX ORDER — ESCITALOPRAM OXALATE 10 MG/1
10 TABLET ORAL DAILY
Qty: 90 TABLET | Refills: 1 | Status: SHIPPED | OUTPATIENT
Start: 2024-08-12

## 2024-08-12 NOTE — TELEPHONE ENCOUNTER
Rx Refill Note  Requested Prescriptions     Pending Prescriptions Disp Refills    escitalopram (LEXAPRO) 10 MG tablet [Pharmacy Med Name: Escitalopram Oxalate 10 MG Oral Tablet] 90 tablet 1     Sig: Take 1 tablet by mouth once daily      Last office visit with prescribing clinician: 2/27/2024   Last telemedicine visit with prescribing clinician: Visit date not found   Next office visit with prescribing clinician: Visit date not found                         Would you like a call back once the refill request has been completed: [] Yes [] No    If the office needs to give you a call back, can they leave a voicemail: [] Yes [] No    Luis Vargas Rep  08/12/24, 09:22 EDT

## 2024-08-14 RX ORDER — MONTELUKAST SODIUM 4 MG/1
TABLET, CHEWABLE ORAL
Qty: 60 TABLET | Refills: 2 | Status: SHIPPED | OUTPATIENT
Start: 2024-08-14

## 2024-08-14 NOTE — TELEPHONE ENCOUNTER
We have provided a 3-month supply of medication to the patient's pharmacy.  Patient will be due for their annual follow-up in September 2024.  Please contact the patient to schedule follow-up appointment.  Thank you.

## 2024-08-15 DIAGNOSIS — K21.00 GASTROESOPHAGEAL REFLUX DISEASE WITH ESOPHAGITIS WITHOUT HEMORRHAGE: ICD-10-CM

## 2024-08-15 RX ORDER — OMEPRAZOLE 40 MG/1
40 CAPSULE, DELAYED RELEASE ORAL DAILY
Qty: 30 CAPSULE | Refills: 0 | OUTPATIENT
Start: 2024-08-15

## 2024-08-27 ENCOUNTER — OFFICE VISIT (OUTPATIENT)
Dept: GASTROENTEROLOGY | Facility: CLINIC | Age: 49
End: 2024-08-27
Payer: COMMERCIAL

## 2024-08-27 VITALS
HEIGHT: 72 IN | DIASTOLIC BLOOD PRESSURE: 78 MMHG | WEIGHT: 250.5 LBS | BODY MASS INDEX: 33.93 KG/M2 | TEMPERATURE: 98.4 F | OXYGEN SATURATION: 96 % | HEART RATE: 65 BPM | SYSTOLIC BLOOD PRESSURE: 118 MMHG

## 2024-08-27 DIAGNOSIS — K52.9 CHRONIC DIARRHEA: ICD-10-CM

## 2024-08-27 DIAGNOSIS — K21.9 GASTROESOPHAGEAL REFLUX DISEASE WITHOUT ESOPHAGITIS: Primary | ICD-10-CM

## 2024-08-27 PROCEDURE — 99213 OFFICE O/P EST LOW 20 MIN: CPT | Performed by: NURSE PRACTITIONER

## 2024-08-27 RX ORDER — TADALAFIL 20 MG/1
TABLET ORAL
COMMUNITY
Start: 2024-05-22

## 2024-08-27 NOTE — PROGRESS NOTES
"Chief Complaint  Heartburn and Diarrhea    Subjective        Austin Orta is a 49 year-old male, new to me, established patient of our practice.  Last seen by RASHID Zafar on 9/13/2023 for GERD and diarrhea due to malabsorption. Past medical history is significant for hiatal hernia, cholecystectomy, chronic diarrhea and GERD. Patient presents here today for annual routine follow-up.    GERD  Patient has been taking Omeprazole 40mg daily for GERD symptoms, reports effectiveness.  Denies abdominal pain.  Denies nausea vomiting, no difficulty swallowing.  6/5/2020, Dr. Ulrich, EGD -grade A esophagitis, fundic gland polyps, and gastritis.  Biopsies were unremarkable for celiac disease and H. pylori.  Reactive gastropathy. Gastric fundus biopsy showed no metaplasia/malignancy.    Chronic diarrhea  Patient has been on colestipol twice daily, stool consistency reestablish at baseline normal.  Denies diarrhea or constipation.  No blood in stool.  Remains on Bentyl 3 times a day as as needed for fecal incontinence/urgency.     No recent change in weight or appetite. Patient denies personal or family history of colorectal or upper GI cancer.    6/5/2020, colonoscopy -a single 5 mm mucosal neuroma polyp in the descending colon.  Nonbleeding internal hemorrhoids.  The rest of colon exam normal.  Next colonoscopy screening due 6/2030.    Objective   Vital Signs:  /78   Pulse 65   Temp 98.4 °F (36.9 °C)   Ht 182.9 cm (72\")   Wt 114 kg (250 lb 8 oz)   SpO2 96%   BMI 33.97 kg/m²   Estimated body mass index is 33.97 kg/m² as calculated from the following:    Height as of this encounter: 182.9 cm (72\").    Weight as of this encounter: 114 kg (250 lb 8 oz).             Physical Exam  Vitals and nursing note reviewed.   Constitutional:       General: He is not in acute distress.     Appearance: Normal appearance. He is normal weight. He is not ill-appearing, toxic-appearing or diaphoretic.   Eyes:      General: No " scleral icterus.     Conjunctiva/sclera: Conjunctivae normal.   Cardiovascular:      Rate and Rhythm: Normal rate and regular rhythm.      Pulses: Normal pulses.      Heart sounds: Normal heart sounds.   Pulmonary:      Effort: Pulmonary effort is normal. No respiratory distress.      Breath sounds: Normal breath sounds. No stridor.   Abdominal:      General: Abdomen is flat. Bowel sounds are normal. There is no distension.      Palpations: Abdomen is soft. There is no mass.      Tenderness: There is no abdominal tenderness. There is no right CVA tenderness, left CVA tenderness, guarding or rebound.      Hernia: No hernia is present.   Skin:     Coloration: Skin is not jaundiced or pale.      Findings: No erythema or rash.   Neurological:      Mental Status: He is alert and oriented to person, place, and time. Mental status is at baseline.   Psychiatric:         Mood and Affect: Mood normal.        Result Review :    The following data was reviewed by: RASHID Reyes on 08/27/2024:  Progress Notes by Tracie Mata APRN (09/13/2023 11:00)        Assessment and Plan     There are no diagnoses linked to this encounter.  Discussion  Patient is a 49-year-old male, new to me, a well-established patient of our practice.  Status post cholecystectomy, chronic diarrhea and GERD    Continue colestipol regimen, 1 g twice daily as symptoms reported under controlled.  Colonoscopy repeat in 10 years (2030).  Continue omeprazole 40 mg daily for acid reflux.  Antiacid reflux precautions discussed with patient.         Follow Up     Return for 1 year or sooner as needed. .  Patient was given instructions and counseling regarding his condition or for health maintenance advice. Please see specific information pulled into the AVS if appropriate.

## 2024-09-11 ENCOUNTER — OFFICE VISIT (OUTPATIENT)
Dept: NEUROLOGY | Facility: CLINIC | Age: 49
End: 2024-09-11
Payer: COMMERCIAL

## 2024-09-11 VITALS
DIASTOLIC BLOOD PRESSURE: 82 MMHG | SYSTOLIC BLOOD PRESSURE: 106 MMHG | BODY MASS INDEX: 34.27 KG/M2 | WEIGHT: 253 LBS | HEIGHT: 72 IN | OXYGEN SATURATION: 98 % | HEART RATE: 74 BPM

## 2024-09-11 DIAGNOSIS — G40.909 NONINTRACTABLE EPILEPSY WITHOUT STATUS EPILEPTICUS, UNSPECIFIED EPILEPSY TYPE: Primary | ICD-10-CM

## 2024-09-11 PROCEDURE — 99214 OFFICE O/P EST MOD 30 MIN: CPT | Performed by: STUDENT IN AN ORGANIZED HEALTH CARE EDUCATION/TRAINING PROGRAM

## 2024-09-11 NOTE — PROGRESS NOTES
"Chief Complaint   Patient presents with    Seizures     No new sz in the last year- keppra Bid - reports new symptoms of being \"quick to anger\" would like to discuss and possible other medication        Patient ID: Austin Orta is a 49 y.o. male.    HPI:    The following portions of the patient's history were reviewed and updated as appropriate: allergies, current medications, past family history, past medical history, past social history, past surgical history and problem list.    Interval history:    Review of Systems   Neurological:  Negative for dizziness, tremors, seizures, syncope, facial asymmetry, speech difficulty, weakness, light-headedness, numbness and headaches.   Psychiatric/Behavioral:  Positive for behavioral problems. Negative for agitation, confusion, decreased concentration, dysphoric mood, hallucinations, self-injury, sleep disturbance and suicidal ideas. The patient is not nervous/anxious and is not hyperactive.       History of Present Illness  The patient presents for follow-up of epilepsy. He has had no seizures in the last year. He is on Keppra 500 mg in the morning, 1000 at night and he is reporting being quick to anger 1 to 2 times a year. He would like to try other medication.    He reports that his last seizure occurred in 2018. However, in 2021, prior to our first consultation, there was an incident that might have been a nocturnal seizure, as he experienced leg soreness upon waking.       Vitals:    09/11/24 1052   BP: 106/82   Pulse:    SpO2:        Neurologic Exam    Physical Exam    Procedures    Assessment/Plan:    Assessment & Plan  1. Epilepsy.  He has had no seizures in the last year. He is currently on Keppra 500 mg in the morning and 1000 mg at night but reports being quick to anger, which he attributes to the medication. We discussed that there are numerous medications available for treating seizures. Keppra is preferred due to its lack of interaction with other medications " and long-term tolerability. However, due to his mood side effects, we discussed switching to Briviact (brivaracetam), which is thought to have fewer mood side effects. The equivalent dosage for Briviact would be 50 mg in the morning and 100 mg at night. The cost of Briviact is higher, but there are co-pay assistance programs available that could reduce the cost significantly. He opted to try Briviact. A prescription for Briviact will be sent to his pharmacy. We discussed seizure precautions and [place] state law regarding no driving for 3 months from the last seizure, which he is out of the window for and can drive.    Follow-up  He will follow up in 6 months.     Patient or patient representative verbalized consent for the use of Ambient Listening during the visit with  Lalo Delacruz MD for chart documentation. 9/11/2024  20:44 EDT    -The patient has epilepsy which is a chronic condition that poses a threat to life or serious harm  -Prescription medications are managed in this visit       Diagnoses and all orders for this visit:    1. Nonintractable epilepsy without status epilepticus, unspecified epilepsy type (Primary)  -     brivaracetam (BRIVIACT) 50 MG tablet; Take 1 tablet in the morning and 2 tablets at night  Dispense: 90 tablet; Refill: 5           Lalo Delacruz MD

## 2024-09-13 DIAGNOSIS — K21.00 GASTROESOPHAGEAL REFLUX DISEASE WITH ESOPHAGITIS WITHOUT HEMORRHAGE: ICD-10-CM

## 2024-09-13 RX ORDER — OMEPRAZOLE 40 MG/1
40 CAPSULE, DELAYED RELEASE ORAL DAILY
Qty: 30 CAPSULE | Refills: 0 | Status: SHIPPED | OUTPATIENT
Start: 2024-09-13

## 2024-10-10 DIAGNOSIS — K21.00 GASTROESOPHAGEAL REFLUX DISEASE WITH ESOPHAGITIS WITHOUT HEMORRHAGE: ICD-10-CM

## 2024-10-11 RX ORDER — OMEPRAZOLE 40 MG/1
40 CAPSULE, DELAYED RELEASE ORAL DAILY
Qty: 90 CAPSULE | Refills: 3 | Status: SHIPPED | OUTPATIENT
Start: 2024-10-11

## 2024-11-04 DIAGNOSIS — K90.9 DIARRHEA DUE TO MALABSORPTION: ICD-10-CM

## 2024-11-04 DIAGNOSIS — R19.7 DIARRHEA DUE TO MALABSORPTION: ICD-10-CM

## 2024-11-04 RX ORDER — MONTELUKAST SODIUM 4 MG/1
TABLET, CHEWABLE ORAL
Qty: 60 TABLET | Refills: 0 | Status: SHIPPED | OUTPATIENT
Start: 2024-11-04

## 2024-11-25 RX ORDER — MELOXICAM 15 MG/1
TABLET ORAL
Qty: 90 TABLET | Refills: 1 | Status: SHIPPED | OUTPATIENT
Start: 2024-11-25

## 2024-11-25 NOTE — TELEPHONE ENCOUNTER
Rx Refill Note  Requested Prescriptions     Pending Prescriptions Disp Refills    meloxicam (MOBIC) 15 MG tablet [Pharmacy Med Name: Meloxicam 15 MG Oral Tablet] 90 tablet 1     Sig: TAKE 1 TABLET BY MOUTH ONCE DAILY AS NEEDED WITH FOOD AND  WATER      Last office visit with prescribing clinician: 2/27/2024   Last telemedicine visit with prescribing clinician: Visit date not found   Next office visit with prescribing clinician: 11/26/2024                         Would you like a call back once the refill request has been completed: [] Yes [] No    If the office needs to give you a call back, can they leave a voicemail: [] Yes [] No    Pato Ivey MA  11/25/24, 08:26 EST

## 2024-11-26 ENCOUNTER — TELEPHONE (OUTPATIENT)
Dept: FAMILY MEDICINE CLINIC | Facility: CLINIC | Age: 49
End: 2024-11-26

## 2024-11-26 ENCOUNTER — HOSPITAL ENCOUNTER (OUTPATIENT)
Dept: GENERAL RADIOLOGY | Facility: HOSPITAL | Age: 49
Discharge: HOME | End: 2024-11-26
Admitting: NURSE PRACTITIONER
Payer: COMMERCIAL

## 2024-11-26 ENCOUNTER — OFFICE VISIT (OUTPATIENT)
Dept: FAMILY MEDICINE CLINIC | Facility: CLINIC | Age: 49
End: 2024-11-26
Payer: COMMERCIAL

## 2024-11-26 VITALS
BODY MASS INDEX: 34.67 KG/M2 | RESPIRATION RATE: 14 BRPM | SYSTOLIC BLOOD PRESSURE: 118 MMHG | HEART RATE: 55 BPM | HEIGHT: 72 IN | WEIGHT: 256 LBS | OXYGEN SATURATION: 97 % | DIASTOLIC BLOOD PRESSURE: 84 MMHG | TEMPERATURE: 98.5 F

## 2024-11-26 DIAGNOSIS — Z12.5 SCREENING FOR PROSTATE CANCER: ICD-10-CM

## 2024-11-26 DIAGNOSIS — M54.16 LUMBAR RADICULOPATHY, ACUTE: ICD-10-CM

## 2024-11-26 DIAGNOSIS — Z13.1 SCREENING FOR DIABETES MELLITUS: ICD-10-CM

## 2024-11-26 DIAGNOSIS — I10 PRIMARY HYPERTENSION: ICD-10-CM

## 2024-11-26 DIAGNOSIS — M54.50 ACUTE BILATERAL LOW BACK PAIN WITHOUT SCIATICA: Primary | ICD-10-CM

## 2024-11-26 DIAGNOSIS — E78.2 MIXED HYPERLIPIDEMIA: ICD-10-CM

## 2024-11-26 DIAGNOSIS — R53.82 CHRONIC FATIGUE: Primary | ICD-10-CM

## 2024-11-26 PROCEDURE — 99214 OFFICE O/P EST MOD 30 MIN: CPT | Performed by: NURSE PRACTITIONER

## 2024-11-26 PROCEDURE — 72100 X-RAY EXAM L-S SPINE 2/3 VWS: CPT

## 2024-11-26 RX ORDER — PREDNISONE 20 MG/1
20 TABLET ORAL DAILY
Qty: 7 TABLET | Refills: 0 | Status: SHIPPED | OUTPATIENT
Start: 2024-11-26 | End: 2024-12-03

## 2024-11-26 RX ORDER — TIZANIDINE HYDROCHLORIDE 2 MG/1
2-4 CAPSULE, GELATIN COATED ORAL 2 TIMES DAILY PRN
Qty: 30 CAPSULE | Refills: 2 | Status: SHIPPED | OUTPATIENT
Start: 2024-11-26

## 2024-11-26 NOTE — PROGRESS NOTES
"Chief Complaint  Back Pain (Pt has been dealing w/ pain, in back, pelvic area, & lower abdomen, Started Saturday night. Pt. Fell in shower after feeling a pull in muscle on right side. )    Subjective        Austin Orta presents to [facility]  History of Present Illness  Pleasant gentleman, was in the shower a week ago, he felt a sudden onset of severe pain in his legs felt a little weak he actually fell because the pain and weakness and landed on his buttock he thinks he most of this was before he fell, but he has increased pain wraps around his low back to his anterior abdomen he has no fever no chills no night sweats unexplained weight loss no bowel or bladder change or urinary complaints  Intermittent low back pain similar this is a third time over the last year, more conservative measures are not helping, taking anti-inflammatory the muscle relaxer did help and he would like  Zanaflex refilled, he thinks something more aggressive medication steroid may help he did have Medrol Dosepak quite a few years ago shortly after he had some A-fib and we had this discussion today risk and benefit,  He prefers to proceed with a steroid.  He has no difficulties A-fib he had a conversion few years ago and he is doing great  Back Pain      Objective   Vital Signs:  /84 (BP Location: Right arm, Patient Position: Sitting, Cuff Size: Adult)   Pulse 55   Temp 98.5 °F (36.9 °C) (Oral)   Resp 14   Ht 182.9 cm (72.01\")   Wt 116 kg (256 lb)   SpO2 97%   BMI 34.71 kg/m²   Estimated body mass index is 34.71 kg/m² as calculated from the following:    Height as of this encounter: 182.9 cm (72.01\").    Weight as of this encounter: 116 kg (256 lb).          Physical Exam  Constitutional:       General: He is not in acute distress.     Appearance: Normal appearance. He is not ill-appearing, toxic-appearing or diaphoretic.   Eyes:      Conjunctiva/sclera: Conjunctivae normal.      Pupils: Pupils " are equal, round, and reactive to light.   Pulmonary:      Effort: Pulmonary effort is normal. No respiratory distress.   Musculoskeletal:         General: Signs of injury present. No swelling, tenderness or deformity.      Comments: Deferred straight leg raise due to pain had intense pain getting on the table, walked guardedly but no focal weakness,   Skin:     General: Skin is warm and dry.   Neurological:      General: No focal deficit present.      Mental Status: Mental status is at baseline.   Psychiatric:         Mood and Affect: Mood normal.         Behavior: Behavior normal.         Thought Content: Thought content normal.         Judgment: Judgment normal.      Result Review :                Assessment and Plan   Diagnoses and all orders for this visit:    1. Acute bilateral low back pain without sciatica (Primary)  -     XR Spine Lumbar 2 or 3 View    Other orders  -     predniSONE (DELTASONE) 20 MG tablet; Take 1 tablet by mouth Daily for 7 days.  Dispense: 7 tablet; Refill: 0  -     TiZANidine (Zanaflex) 2 MG capsule; Take 1-2 capsules by mouth 2 (Two) Times a Day As Needed for Muscle Spasms. Caution may cause sedation lowest effective dose, mostly for bedtime  Dispense: 30 capsule; Refill: 2             Follow Up   No follow-ups on file.  Patient was given instructions and counseling regarding his condition or for health maintenance advice. Please see specific information pulled into the AVS if appropriate.     There are no Patient Instructions on file for this visit.

## 2024-11-26 NOTE — TELEPHONE ENCOUNTER
Patient scheduled 6 month follow up in May. Patient stated he hasn't had lab work done in about a year. Please place orders if appropriate.

## 2024-11-26 NOTE — PATIENT INSTRUCTIONS
Discharge instructions, avoid activities now causing pain simply get in a comfortable position in the next few days or week  Prednisone with caution should you have increased heart rate palpitations discontinue or elevated blood pressure other issues otherwise we adjusted the dose to decrease risk, Tylenol 650    2 twice daily if needed on top of steroid  After steroid completion if still having considerable pain you can take an anti-inflammatory meloxicam 50 mg daily with food and tall glass of water for 2 to 3 weeks only then discontinue    Physical therapy to help strengthen your back and help guide you for chronic exercises to minimize reoccurrence if not significant better or improvement in 6 weeks we should get MRI if weakness bowel or bladder incontinence retention saddlebag paresthesias fever chills back pain saddlebag paresthesias emergency room follow-up in 6 weeks and update me in 1 week

## 2024-11-30 DIAGNOSIS — K90.9 DIARRHEA DUE TO MALABSORPTION: ICD-10-CM

## 2024-11-30 DIAGNOSIS — R19.7 DIARRHEA DUE TO MALABSORPTION: ICD-10-CM

## 2024-12-02 RX ORDER — MONTELUKAST SODIUM 4 MG/1
TABLET, CHEWABLE ORAL
Qty: 60 TABLET | Refills: 0 | Status: SHIPPED | OUTPATIENT
Start: 2024-12-02

## 2024-12-11 ENCOUNTER — OFFICE VISIT (OUTPATIENT)
Dept: CARDIOLOGY | Facility: CLINIC | Age: 49
End: 2024-12-11
Payer: COMMERCIAL

## 2024-12-11 ENCOUNTER — HOSPITAL ENCOUNTER (OUTPATIENT)
Dept: CARDIOLOGY | Facility: HOSPITAL | Age: 49
Discharge: HOME | End: 2024-12-11
Admitting: NURSE PRACTITIONER
Payer: COMMERCIAL

## 2024-12-11 VITALS
HEIGHT: 72 IN | BODY MASS INDEX: 34.4 KG/M2 | WEIGHT: 254 LBS | HEART RATE: 62 BPM | DIASTOLIC BLOOD PRESSURE: 81 MMHG | SYSTOLIC BLOOD PRESSURE: 120 MMHG

## 2024-12-11 DIAGNOSIS — E78.2 MIXED HYPERLIPIDEMIA: Primary | ICD-10-CM

## 2024-12-11 DIAGNOSIS — I48.0 PAROXYSMAL ATRIAL FIBRILLATION: ICD-10-CM

## 2024-12-11 DIAGNOSIS — I10 PRIMARY HYPERTENSION: ICD-10-CM

## 2024-12-11 LAB
ALBUMIN SERPL-MCNC: 4.2 G/DL (ref 3.5–5.2)
ALBUMIN/GLOB SERPL: 1.3 G/DL
ALP SERPL-CCNC: 92 U/L (ref 39–117)
ALT SERPL W P-5'-P-CCNC: 28 U/L (ref 1–41)
ANION GAP SERPL CALCULATED.3IONS-SCNC: 11 MMOL/L (ref 5–15)
AST SERPL-CCNC: 21 U/L (ref 1–40)
BILIRUB SERPL-MCNC: 1.5 MG/DL (ref 0–1.2)
BUN SERPL-MCNC: 15 MG/DL (ref 6–20)
BUN/CREAT SERPL: 18.1 (ref 7–25)
CALCIUM SPEC-SCNC: 9.4 MG/DL (ref 8.6–10.5)
CHLORIDE SERPL-SCNC: 98 MMOL/L (ref 98–107)
CHOLEST SERPL-MCNC: 214 MG/DL (ref 0–200)
CO2 SERPL-SCNC: 28 MMOL/L (ref 22–29)
CREAT SERPL-MCNC: 0.83 MG/DL (ref 0.76–1.27)
EGFRCR SERPLBLD CKD-EPI 2021: 106.6 ML/MIN/1.73
GLOBULIN UR ELPH-MCNC: 3.2 GM/DL
GLUCOSE SERPL-MCNC: 98 MG/DL (ref 65–99)
HDLC SERPL-MCNC: 42 MG/DL (ref 40–60)
LDLC SERPL CALC-MCNC: 138 MG/DL (ref 0–100)
LDLC/HDLC SERPL: 3.19 {RATIO}
POTASSIUM SERPL-SCNC: 4.4 MMOL/L (ref 3.5–5.2)
PROT SERPL-MCNC: 7.4 G/DL (ref 6–8.5)
SODIUM SERPL-SCNC: 137 MMOL/L (ref 136–145)
TRIGL SERPL-MCNC: 190 MG/DL (ref 0–150)
VLDLC SERPL-MCNC: 34 MG/DL (ref 5–40)

## 2024-12-11 PROCEDURE — 80053 COMPREHEN METABOLIC PANEL: CPT | Performed by: NURSE PRACTITIONER

## 2024-12-11 PROCEDURE — 36415 COLL VENOUS BLD VENIPUNCTURE: CPT | Performed by: NURSE PRACTITIONER

## 2024-12-11 PROCEDURE — 93000 ELECTROCARDIOGRAM COMPLETE: CPT | Performed by: NURSE PRACTITIONER

## 2024-12-11 PROCEDURE — 99214 OFFICE O/P EST MOD 30 MIN: CPT | Performed by: NURSE PRACTITIONER

## 2024-12-11 PROCEDURE — 80061 LIPID PANEL: CPT | Performed by: NURSE PRACTITIONER

## 2024-12-11 NOTE — PROGRESS NOTES
Subjective:        Austin Orta is a 49 y.o. male who here for follow up    No chief complaint on file.      HPI      Austin Orta is a 49-year-old male who is here today for a 1 year follow-up  for hypertension and atrial fibrillation management.  He also has a history of hyperlipidemia and GERD.  He denies any chest pain, shortness of breath, or palpitations. Recently on medrol Dosepak due to back injury after falling in the shower.     12/10/2018 echo: EF 60%, normal LV function.  12/11/2018 myocardial perfusion indicating a normal study with no evidence of ischemia.  2019 Holter monitor: Normal monitor study.  2019: Successful cardioversion      The following portions of the patient's history were reviewed and updated as appropriate: allergies, current medications, past family history, past medical history, past social history, past surgical history and problem list.    Past Medical History:   Diagnosis Date    Allergic     seasonal    Anxiety     Anxiety and depression     Atrial fibrillation 12/09/2018    Cavernous malformation     Depression     Frozen shoulder     GERD (gastroesophageal reflux disease)     History of prostatitis     Injury of right heel 07/27/2018    Insomnia     Joint pain     Kidney stones 2017 1997, 2010 also    Left rotator cuff tear     Paroxysmal atrial fibrillation 04/11/2019    Added automatically from request for surgery [id number]    Pre-diabetes     Prostatitis     Right rotator cuff tear     Venous angioma of brain          reports that he has never smoked. He has never used smokeless tobacco. He reports that he does not drink alcohol and does not use drugs.     Family History   Problem Relation Age of Onset    Cancer Mother     Hypertension Mother     Skin cancer Father     Nephrolithiasis Sister     Heart attack Maternal Grandmother     Cancer Maternal Grandmother     Heart attack Maternal Grandfather     Heart disease Maternal Grandfather     Dementia Paternal Grandmother      Alzheimer's disease Paternal Grandmother     Skin cancer Paternal Grandfather     Colon cancer Neg Hx     Colon polyps Neg Hx     Crohn's disease Neg Hx     Irritable bowel syndrome Neg Hx     Ulcerative colitis Neg Hx        ROS     Review of Systems  Constitutional: No wt loss, fever, fatigue  Gastrointestinal: No nausea, abdominal pain  Behavioral/Psych: No insomnia or anxiety  Cardiovascular: no cp or shob      Objective:           Vitals and nursing note reviewed.   Constitutional:       Appearance: Well-developed.   HENT:      Head: Normocephalic.      Right Ear: External ear normal.      Left Ear: External ear normal.   Neck:      Vascular: No JVD.   Pulmonary:      Effort: Pulmonary effort is normal. No respiratory distress.      Breath sounds: Normal breath sounds. No stridor. No rales.   Cardiovascular:      Normal rate. Regular rhythm.      No gallop.    Pulses:     Intact distal pulses.   Edema:     Peripheral edema absent.   Abdominal:      General: Bowel sounds are normal. There is no distension.      Palpations: Abdomen is soft.      Tenderness: There is no abdominal tenderness. There is no guarding.   Musculoskeletal: Normal range of motion.         General: No tenderness.      Cervical back: Normal range of motion. Skin:     General: Skin is warm.   Neurological:      Mental Status: Alert and oriented to person, place, and time.      Deep Tendon Reflexes: Reflexes are normal and symmetric.   Psychiatric:         Judgment: Judgment normal.           ECG 12 Lead    Date/Time: 12/11/2024 2:49 PM  Performed by: Cordelia Lugo APRN    Authorized by: Cordelia Lugo APRN  Comparison: compared with previous ECG from 11/28/2023  Rhythm: sinus rhythm  Rate: normal    Clinical impression: non-specific ECG        2018  Interpretation Summary    Atrial fibrillation was the predominant rhythm observed during the procedure.  Calculated EF = 60%.  Left ventricular systolic function is normal.  Normal  valvular structure and function    2018      Interpretation Summary    Equivocal ECG evidence of myocardial ischemia.Negative clinical evidence of myocardial ischemia.  Myocardial perfusion imaging indicates a normal myocardial perfusion study with no evidence of ischemia. Impressions are consistent with a low risk study.      2019    Interpretation Summary    A normal monitor study.     Current Outpatient Medications:     acetaminophen (TYLENOL) 500 MG tablet, Take 1 tablet by mouth Every 6 (Six) Hours As Needed for Mild Pain., Disp: , Rfl:     aspirin 81 MG chewable tablet, Chew 1 tablet Daily., Disp: , Rfl:     bisoprolol (ZEBeta) 5 MG tablet, Take 1 tablet by mouth Daily., Disp: 30 tablet, Rfl: 11    Blood Glucose Monitoring Suppl (ONE TOUCH ULTRA 2) w/Device kit, , Disp: , Rfl:     brivaracetam (BRIVIACT) 50 MG tablet, Take 1 tablet in the morning and 2 tablets at night, Disp: 90 tablet, Rfl: 5    colestipol (COLESTID) 1 g tablet, TAKE 1 TABLET BY MOUTH TWICE DAILY BEFORE MEAL(S), Disp: 60 tablet, Rfl: 0    cyclobenzaprine (FLEXERIL) 10 MG tablet, Take 1 tablet by mouth At Night As Needed (shoulder pain)., Disp: 30 tablet, Rfl: 0    dicyclomine (BENTYL) 10 MG capsule, TAKE ONE CAPSULE BY MOUTH THREE TIMES A DAY AS NEEDED FOR ABDOMINAL PAIN/DIARRHEA, Disp: 90 capsule, Rfl: 0    escitalopram (LEXAPRO) 10 MG tablet, Take 1 tablet by mouth once daily, Disp: 90 tablet, Rfl: 1    fluticasone (VERAMYST) 27.5 MCG/SPRAY nasal spray, Administer 2 sprays into the nostril(s) as directed by provider Daily., Disp: , Rfl:     glucose blood test strip, Use as instructed. please check blood sugar 2 times a day. Please prescribe to pt what insurance will cover. DX:E11.9, Disp: 200 each, Rfl: 12    glucose monitor monitoring kit, Use as directed. Check blood sugar twice daily. Please prescribe to pt what insurance will cover. DX:E11.9, Disp: 1 each, Rfl: 0    Lancets (onetouch ultrasoft) lancets, Use as directed. Pleas check  blood sugar 2 times daily. Please prescribe to pt what insurance will cover DX:E11.9, Disp: 200 each, Rfl: 12    levocetirizine (XYZAL) 5 MG tablet, TAKE 1 TABLET BY MOUTH ONCE DAILY IN THE EVENING, Disp: 90 tablet, Rfl: 3    meloxicam (MOBIC) 15 MG tablet, TAKE 1 TABLET BY MOUTH ONCE DAILY AS NEEDED WITH FOOD AND  WATER, Disp: 90 tablet, Rfl: 1    omeprazole (priLOSEC) 40 MG capsule, Take 1 capsule by mouth once daily, Disp: 90 capsule, Rfl: 3    tadalafil (CIALIS) 20 MG tablet, TAKE 1 TABLET BY MOUTH AS NEEDED FOR ERECTILE DYSFUNCTION. DO NOT EXCEED 1 TABLET IN 36 HOURS. FOR ERECTIONS LASTING LONGER THAN 4 HOURS, PROCEED TO ER., Disp: , Rfl:     Testosterone 1.62 % gel, APPLY 3 PUMPS TO THE SKIN AS DIRECTED TOPICALLY ONCE DAILY, Disp: , Rfl:     TiZANidine (Zanaflex) 2 MG capsule, Take 1-2 capsules by mouth 2 (Two) Times a Day As Needed for Muscle Spasms. Caution may cause sedation lowest effective dose, mostly for bedtime, Disp: 30 capsule, Rfl: 2     Assessment:        Patient Active Problem List   Diagnosis    Atopic rhinitis    Biliary dyskinesia    Diarrhea    Gastroesophageal reflux disease    Insomnia    Prostatitis    Chronic fatigue    Seasonal allergies    Right shoulder pain    Mixed hyperlipidemia    Non morbid obesity due to excess calories    Eustachian tube dysfunction    Rotator cuff arthropathy    Viral syndrome    Pain of both hip joints    Abdominal pain    Acute bilateral low back pain without sciatica    Strain of Achilles tendon, initial encounter    Depression    Seizure    Cavernous malformation    Anticoagulated    Venous angioma of brain    Anxiety    Calculus of kidney    Primary hypertension               Plan:   1.  Hypertension: Today in the office blood pressure is stable.  Continue bisoprolol .    Educated patient on exercising for at least 30 minutes a day for 2 to 3 days a week. Importance of controlling hypertension and blood pressure checkup on the regular basis has been  explained. Hypertension as a silent killer has been discussed. Risk reduction of the weight and regular exercises to control the hypertension has been explained.    2.  PAF: EKG today shows SR.  No a/c due to afib happening once. Continue beta-blockade.     3.  Hyperlipidemia: His labs and cholesterol are done by James Epley, APRN.    Risk of the hyperlipidemia, importance of the treatment has been explained. Pros and cons of the statins has been explained. Regular blood workup as well as side effects including the liver failure, myelopathy death has been explained.               No diagnosis found.    There are no diagnoses linked to this encounter.    COUNSELING: peter Griffiths was given to patient for the following topics: diagnostic results, risk factor reductions, impressions, risks and benefits of treatment options and importance of treatment compliance .       SMOKING COUNSELING: denies    -Cmp and lipid panel. Call with results.  -F/u up in one year unless you need to be seen sooner.    Sincerely,   RASHID Lindsey  [facility]  12/11/24  14:29 EST    EMR Dragon/Transcription disclaimer: Dictated utilizing Dragon Dictation

## 2024-12-13 ENCOUNTER — TELEPHONE (OUTPATIENT)
Dept: CARDIOLOGY | Facility: CLINIC | Age: 49
End: 2024-12-13
Payer: COMMERCIAL

## 2024-12-13 DIAGNOSIS — E78.2 MIXED HYPERLIPIDEMIA: Primary | ICD-10-CM

## 2024-12-13 NOTE — TELEPHONE ENCOUNTER
----- Message from Cordelia Lugo sent at 12/13/2024  2:20 PM EST -----  Let him know his labs revealed bilrirbun is slightly elevated and he should follow up with his pcp for him to monitor. . , HDL 42, , and triglycerides is 190. Lipid panel was elevated however he can monitor his diet and exercise. I recommend a mediterranean type diet. He will need to repeat cmp and lipid panel in 6 months.       Thanks, Angel      SPOKE TO PATIENT HE UNDERSTOOD AND AGREED I PLACD LAB ORDERS TO BE DONE IN 6MO

## 2024-12-26 DIAGNOSIS — K90.9 DIARRHEA DUE TO MALABSORPTION: ICD-10-CM

## 2024-12-26 DIAGNOSIS — R19.7 DIARRHEA DUE TO MALABSORPTION: ICD-10-CM

## 2024-12-26 RX ORDER — COLESTIPOL HYDROCHLORIDE 1 G/1
TABLET ORAL
Qty: 60 TABLET | Refills: 0 | Status: SHIPPED | OUTPATIENT
Start: 2024-12-26

## 2024-12-26 RX ORDER — BISOPROLOL FUMARATE 5 MG/1
5 TABLET, FILM COATED ORAL DAILY
Qty: 30 TABLET | Refills: 3 | Status: SHIPPED | OUTPATIENT
Start: 2024-12-26

## 2025-01-08 RX ORDER — METHYLPREDNISOLONE 4 MG/1
TABLET ORAL
Qty: 21 TABLET | Refills: 0 | Status: SHIPPED | OUTPATIENT
Start: 2025-01-08

## 2025-01-08 RX ORDER — CYCLOBENZAPRINE HCL 10 MG
10 TABLET ORAL NIGHTLY PRN
Qty: 30 TABLET | Refills: 0 | Status: SHIPPED | OUTPATIENT
Start: 2025-01-08

## 2025-01-13 ENCOUNTER — TREATMENT (OUTPATIENT)
Dept: PHYSICAL THERAPY | Facility: CLINIC | Age: 50
End: 2025-01-13
Payer: COMMERCIAL

## 2025-01-13 DIAGNOSIS — M54.50 ACUTE MIDLINE LOW BACK PAIN WITHOUT SCIATICA: Primary | ICD-10-CM

## 2025-01-13 DIAGNOSIS — Z74.09 IMPAIRED FUNCTIONAL MOBILITY AND ACTIVITY TOLERANCE: ICD-10-CM

## 2025-01-13 PROCEDURE — 97530 THERAPEUTIC ACTIVITIES: CPT | Performed by: PHYSICAL THERAPIST

## 2025-01-13 PROCEDURE — 97161 PT EVAL LOW COMPLEX 20 MIN: CPT | Performed by: PHYSICAL THERAPIST

## 2025-01-13 PROCEDURE — 97110 THERAPEUTIC EXERCISES: CPT | Performed by: PHYSICAL THERAPIST

## 2025-01-13 NOTE — PROGRESS NOTES
Physical Therapy Initial Evaluation and Plan of Care  Clinic Location [address], [place] [id number]    Patient: Austin Orta   : 1975  Diagnosis/ICD-10 Code:  Acute midline low back pain without sciatica [M54.50]  Referring practitioner: James Epley, APRN  Date of Initial Visit: 2025  Today's Date: 2025  Patient seen for 1 session         Visit Diagnoses:    ICD-10-CM ICD-9-CM   1. Acute midline low back pain without sciatica  M54.50 724.2   2. Impaired functional mobility and activity tolerance  Z74.09 V49.89         Subjective Questionnaire: Oswestry: 52/100      Subjective Evaluation    History of Present Illness  Mechanism of injury: 2 instances of low back/B hip pain Summer 2024. 1st episode was simply reaching for watch on . 2024 bent over in shower, back went out, legs gave out, could not stand w/o assistance from his wife. Rx steroids.   25, lifted large suitcase felt a pop in lower back, no pain, quads felt weak x2 days  25 filled generator with gas, severe pain that evening  25 went to [facility] normal, Rx diclofenac 2x/day and muscle relaxer. Intermittent L groin pain. Much better today.    Chronic R shoulder pain, Dx tendonitis, cannot throw or play volleyball    No current exercise routine      Patient Occupation: Desk work Pain  Current pain rating: 3  At worst pain rating: 10  Location: Lower back  Quality: dull ache  Relieving factors: medications and heat (Laying)  Exacerbated by: Sitting.  Progression: improved    Social Support  Lives in: one-story house  Lives with: spouse    Diagnostic Tests  X-ray: normal    Treatments  Previous treatment: medication  Current treatment: physical therapy  Patient Goals  Patient goals for therapy: decreased pain and independence with ADLs/IADLs           Objective        Special Questions      Additional Special Questions  Denies change in bladder/bowel      Tenderness     Lumbar  Spine  Tenderness in the spinous process (L5/S1).     Active Range of Motion     Lumbar   Flexion: WFL  Extension: Active lumbar extension: 50% of normal, improved w/ reps. with pain  Left lateral flexion: WFL  Right lateral flexion: Active right lumbar lateral flexion: Pain L/midline. WFL  Left rotation: Active left lumbar rotation: 50% of normal.   Right rotation: Active right lumbar rotation: 50% of normal, pain midline.     Strength/Myotome Testing     Left Hip   Planes of Motion   Flexion: 5    Right Hip   Planes of Motion   Flexion: 5    Left Knee   Flexion: 4+  Extension: 4+    Right Knee   Flexion: 4+  Extension: 4+    Left Ankle/Foot   Dorsiflexion: 5    Right Ankle/Foot   Dorsiflexion: 5    Tests       Thoracic   Positive slump (B posterior thigh tightness).     Lumbar     Left   Negative passive SLR.     Right   Negative passive SLR.     Additional Tests Details  + ANTWAN B          Assessment & Plan       Assessment  Impairments: abnormal or restricted ROM, activity intolerance, lacks appropriate home exercise program and pain with function   Functional limitations: carrying objects, lifting, walking, uncomfortable because of pain and standing   Assessment details: Pt presents with acute on chronic low back pain, tenderness, painful/limited lumbar AROM, and nerve tension. Will benefit from skilled PT services in order to address listed impairments and increase tolerance to normal daily activities including ADLs and recreational activities.    Prognosis: good    Goals  Plan Goals: Short Term Goals: 4 weeks. Patient will:  1. Be independent with initial HEP  2. Report >/=50% decrease in pain w/ ADLs    Long Term Goals: 12 weeks. Patient will:  1. Demonstrate lower extremity flexibility and lumbar ROM WFL to allow for return to household & recreational activities w/o increased symptoms  2. Perceived disability </= 10% as measured by Oswestry Questionnaire.  3. Be able to lift 30lb from floor to counter  safely, pain free  4. Be independent with long term HEP    Plan  Therapy options: will be seen for skilled therapy services  Planned modality interventions: cryotherapy, electrical stimulation/Russian stimulation and thermotherapy (hydrocollator packs)  Planned therapy interventions: abdominal trunk stabilization, ADL retraining, body mechanics training, flexibility, functional ROM exercises, home exercise program, manual therapy, neuromuscular re-education, soft tissue mobilization, spinal/joint mobilization, strengthening, stretching and therapeutic activities  Frequency: 2x week  Duration in weeks: 12  Treatment plan discussed with: patient        History # of Personal Factors and/or Comorbidities: LOW (0)  Examination of Body System(s): # of elements: LOW (1-2)  Clinical Presentation: STABLE   Clinical Decision Making: LOW       Timed:         Manual Therapy:    0     mins  20367;     Therapeutic Exercise:    15     mins  33581;     Neuromuscular Amrik:    0    mins  14314;    Therapeutic Activity:     8     mins  43777;     Gait Trainin     mins  13669;     Ultrasound:     0     mins  10204;    Ionto                               0    mins   82240  Self Care                       0     mins   34293  Canalith Repos    0     mins 99439      Un-Timed:  Electrical Stimulation:    0     mins  53387 ( );  Dry Needling     0     mins self-pay  Traction     0     mins 04063  Low Eval     25     Mins  98113  Mod Eval     0     Mins  99206  High Eval                       0     Mins  52536        Timed Treatment:   23   mins   Total Treatment:     48   mins          PT: Heather Hernadez PT     License Number: [id number]  Electronically signed by Heather Hernadez PT, 25, 12:35 PM EST    Certification Period: 2025 thru 2025  I certify that the therapy services are furnished while this patient is under my care.  The services outlined above are required by this patient, and will be reviewed every   days.         Physician Signature:__________________________________________________    PHYSICIAN: Epley, James, APRN  NPI: [id number]                                      DATE:      Please sign and return via fax to [email or web address] . Thank you, [facility].

## 2025-01-13 NOTE — PATIENT INSTRUCTIONS
Access Code: [email or web address]  URL: [email or web address]  Date: 01/13/2025  Prepared by: Heather Hernadez    Exercises  - Supine Lower Trunk Rotation  - 1 x daily - 7 x weekly - 1 sets - 10 reps  - Supine Posterior Pelvic Tilt  - 1 x daily - 7 x weekly - 1 sets - 10 reps - 5s hold  - Prone Press Up On Elbows  - 1 x daily - 7 x weekly - 1 sets - 10 reps  - Supine Hamstring Stretch  - 1 x daily - 7 x weekly - 1 sets - 6 reps - 10s hold  - Seated Figure 4 Piriformis Stretch  - 1 x daily - 7 x weekly - 1 sets - 3 reps - 20s hold

## 2025-01-16 ENCOUNTER — TELEPHONE (OUTPATIENT)
Dept: NEUROLOGY | Facility: CLINIC | Age: 50
End: 2025-01-16
Payer: COMMERCIAL

## 2025-01-16 NOTE — TELEPHONE ENCOUNTER
"  Caller: Austin Orta \"KASHMIR\"    Relationship to patient: Self      Best call back number: [phone] (home)     Provider: DR DE PAZ    Medication PA needed: BRIVIACT    Reason for call/Prior Auth: PT HAS NEW INS AND THE PHARMACY IS SENDING OVER THE PA DOC.   "

## 2025-01-20 ENCOUNTER — TELEPHONE (OUTPATIENT)
Dept: ORTHOPEDICS | Facility: OTHER | Age: 50
End: 2025-01-20
Payer: COMMERCIAL

## 2025-01-27 ENCOUNTER — TREATMENT (OUTPATIENT)
Dept: PHYSICAL THERAPY | Facility: CLINIC | Age: 50
End: 2025-01-27
Payer: COMMERCIAL

## 2025-01-27 DIAGNOSIS — Z74.09 IMPAIRED FUNCTIONAL MOBILITY AND ACTIVITY TOLERANCE: ICD-10-CM

## 2025-01-27 DIAGNOSIS — M54.50 ACUTE MIDLINE LOW BACK PAIN WITHOUT SCIATICA: Primary | ICD-10-CM

## 2025-01-27 PROCEDURE — 97110 THERAPEUTIC EXERCISES: CPT | Performed by: PHYSICAL THERAPIST

## 2025-01-27 NOTE — PROGRESS NOTES
Physical Therapy Daily Treatment Note      Patient: Austin Orta   : 1975  Referring practitioner: James Epley, APRN  Date of Initial Visit: Type: THERAPY  Noted: 2025  Today's Date: 2025  Patient seen for 2 sessions       Visit Diagnoses:    ICD-10-CM ICD-9-CM   1. Acute midline low back pain without sciatica  M54.50 724.2   2. Impaired functional mobility and activity tolerance  Z74.09 V49.89       Subjective   Low back pain >1 week ago sitting on hard seat at dinner >1hr. Cleaned house yesterday w/o pain. R heel pain acting up.    Objective   See Exercise, Manual, and Modality Logs for complete treatment.       Assessment/Plan  Tolerated new core and lumbar strengthening well. Reported discomfort w/ hip hinge at counter. Heel pain relieved with towel stretch. Progress per POC.    Timed:         Manual Therapy:    0     mins  90232;     Therapeutic Exercise:    30     mins  00738;     Neuromuscular Amrik:    0    mins  54238;    Therapeutic Activity:     0     mins  62147;     Gait Trainin     mins  68807;     Ultrasound:     0     mins  50007;    Ionto                               0    mins   04708  Self Care                       0     mins   50821      Un-Timed:  Electrical Stimulation:    0     mins  69811 ( );  Dry Needling     0     mins self-pay  Traction     0     mins 31407  Canalith Repos    0     mins 01088    Timed Treatment:   30   mins   Total Treatment:     30   mins    Heather Hernadez, PT  [place] License: [id number]

## 2025-02-03 ENCOUNTER — TREATMENT (OUTPATIENT)
Dept: PHYSICAL THERAPY | Facility: CLINIC | Age: 50
End: 2025-02-03
Payer: COMMERCIAL

## 2025-02-03 ENCOUNTER — LAB (OUTPATIENT)
Dept: LAB | Facility: HOSPITAL | Age: 50
End: 2025-02-03
Payer: COMMERCIAL

## 2025-02-03 ENCOUNTER — TRANSCRIBE ORDERS (OUTPATIENT)
Dept: ADMINISTRATIVE | Facility: HOSPITAL | Age: 50
End: 2025-02-03
Payer: COMMERCIAL

## 2025-02-03 DIAGNOSIS — E29.1 3-OXO-5 ALPHA-STEROID DELTA 4-DEHYDROGENASE DEFICIENCY: Primary | ICD-10-CM

## 2025-02-03 DIAGNOSIS — M54.50 ACUTE MIDLINE LOW BACK PAIN WITHOUT SCIATICA: Primary | ICD-10-CM

## 2025-02-03 DIAGNOSIS — E29.1 3-OXO-5 ALPHA-STEROID DELTA 4-DEHYDROGENASE DEFICIENCY: ICD-10-CM

## 2025-02-03 DIAGNOSIS — Z74.09 IMPAIRED FUNCTIONAL MOBILITY AND ACTIVITY TOLERANCE: ICD-10-CM

## 2025-02-03 LAB
BASOPHILS # BLD AUTO: 0.05 10*3/MM3 (ref 0–0.2)
BASOPHILS NFR BLD AUTO: 0.5 % (ref 0–1.5)
DEPRECATED RDW RBC AUTO: 36.5 FL (ref 37–54)
EOSINOPHIL # BLD AUTO: 0.12 10*3/MM3 (ref 0–0.4)
EOSINOPHIL NFR BLD AUTO: 1.2 % (ref 0.3–6.2)
ERYTHROCYTE [DISTWIDTH] IN BLOOD BY AUTOMATED COUNT: 12.1 % (ref 12.3–15.4)
HCT VFR BLD AUTO: 43.7 % (ref 37.5–51)
HGB BLD-MCNC: 15.2 G/DL (ref 13–17.7)
IMM GRANULOCYTES # BLD AUTO: 0.05 10*3/MM3 (ref 0–0.05)
IMM GRANULOCYTES NFR BLD AUTO: 0.5 % (ref 0–0.5)
LYMPHOCYTES # BLD AUTO: 3.11 10*3/MM3 (ref 0.7–3.1)
LYMPHOCYTES NFR BLD AUTO: 30.2 % (ref 19.6–45.3)
MCH RBC QN AUTO: 29.3 PG (ref 26.6–33)
MCHC RBC AUTO-ENTMCNC: 34.8 G/DL (ref 31.5–35.7)
MCV RBC AUTO: 84.4 FL (ref 79–97)
MONOCYTES # BLD AUTO: 0.86 10*3/MM3 (ref 0.1–0.9)
MONOCYTES NFR BLD AUTO: 8.3 % (ref 5–12)
NEUTROPHILS NFR BLD AUTO: 59.3 % (ref 42.7–76)
NEUTROPHILS NFR BLD AUTO: 6.11 10*3/MM3 (ref 1.7–7)
NRBC BLD AUTO-RTO: 0 /100 WBC (ref 0–0.2)
PLATELET # BLD AUTO: 296 10*3/MM3 (ref 140–450)
PMV BLD AUTO: 8.7 FL (ref 6–12)
PSA SERPL-MCNC: 1.18 NG/ML (ref 0–4)
RBC # BLD AUTO: 5.18 10*6/MM3 (ref 4.14–5.8)
WBC NRBC COR # BLD AUTO: 10.3 10*3/MM3 (ref 3.4–10.8)

## 2025-02-03 PROCEDURE — 85025 COMPLETE CBC W/AUTO DIFF WBC: CPT

## 2025-02-03 PROCEDURE — 84402 ASSAY OF FREE TESTOSTERONE: CPT

## 2025-02-03 PROCEDURE — 36415 COLL VENOUS BLD VENIPUNCTURE: CPT

## 2025-02-03 PROCEDURE — 84403 ASSAY OF TOTAL TESTOSTERONE: CPT

## 2025-02-03 PROCEDURE — 84153 ASSAY OF PSA TOTAL: CPT

## 2025-02-03 NOTE — PROGRESS NOTES
Physical Therapy Daily Treatment Note      Patient: Austin Orta   : 1975  Referring practitioner: James Epley, APRN  Date of Initial Visit: Type: THERAPY  Noted: 2025  Today's Date: 2/3/2025  Patient seen for 3 sessions       Visit Diagnoses:    ICD-10-CM ICD-9-CM   1. Acute midline low back pain without sciatica  M54.50 724.2   2. Impaired functional mobility and activity tolerance  Z74.09 V49.89       Subjective   Low back pain after walking around [facility] center x1 hr 45 min. Relief sitting. No pain currently.    Objective   See Exercise, Manual, and Modality Logs for complete treatment.       Assessment/Plan  L low back pain w/ exercise progression. Tender at L QL insertion on innominate. Improved with massage and heat. Progress per POC.    Timed:         Manual Therapy:    8     mins  65031;     Therapeutic Exercise:    25     mins  42571;     Neuromuscular Amrik:    0    mins  72359;    Therapeutic Activity:     0     mins  83212;     Gait Trainin     mins  60351;     Ultrasound:     0     mins  11910;    Ionto                               0    mins   00528  Self Care                       0     mins   28528      Un-Timed:  Electrical Stimulation:    0     mins  24262 ( );  Dry Needling     0     mins self-pay  Traction     0     mins 53847  Canalith Repos    0     mins 71723    Timed Treatment:   25   mins   Total Treatment:     33   mins    MADISON Dumont License: [id number]

## 2025-02-06 LAB
TESTOST FREE MFR SERPL: 3.13 % (ref 1.5–4.2)
TESTOST FREE SERPL-MCNC: 6.57 NG/DL (ref 5–21)
TESTOST SERPL-MCNC: 210 NG/DL (ref 264–916)

## 2025-02-10 ENCOUNTER — TREATMENT (OUTPATIENT)
Dept: PHYSICAL THERAPY | Facility: CLINIC | Age: 50
End: 2025-02-10
Payer: COMMERCIAL

## 2025-02-10 ENCOUNTER — HOSPITAL ENCOUNTER (OUTPATIENT)
Dept: GENERAL RADIOLOGY | Facility: HOSPITAL | Age: 50
Discharge: HOME | End: 2025-02-10
Admitting: UROLOGY
Payer: COMMERCIAL

## 2025-02-10 ENCOUNTER — TRANSCRIBE ORDERS (OUTPATIENT)
Dept: ADMINISTRATIVE | Facility: HOSPITAL | Age: 50
End: 2025-02-10
Payer: COMMERCIAL

## 2025-02-10 DIAGNOSIS — Z74.09 IMPAIRED FUNCTIONAL MOBILITY AND ACTIVITY TOLERANCE: ICD-10-CM

## 2025-02-10 DIAGNOSIS — M54.50 ACUTE MIDLINE LOW BACK PAIN WITHOUT SCIATICA: Primary | ICD-10-CM

## 2025-02-10 DIAGNOSIS — Z87.442 HISTORY OF RENAL CALCULI: ICD-10-CM

## 2025-02-10 DIAGNOSIS — Z87.442 PERSONAL HISTORY OF URINARY CALCULI: Primary | ICD-10-CM

## 2025-02-10 PROCEDURE — 74018 RADEX ABDOMEN 1 VIEW: CPT

## 2025-02-10 PROCEDURE — 97110 THERAPEUTIC EXERCISES: CPT | Performed by: PHYSICAL THERAPIST

## 2025-02-10 NOTE — PROGRESS NOTES
Physical Therapy Daily Treatment Note      Patient: Austin Orta   : 1975  Referring practitioner: James Epley, APRN  Date of Initial Visit: Type: THERAPY  Noted: 2025  Today's Date: 2/10/2025  Patient seen for 4 sessions       Visit Diagnoses:    ICD-10-CM ICD-9-CM   1. Acute midline low back pain without sciatica  M54.50 724.2   2. Impaired functional mobility and activity tolerance  Z74.09 V49.89       Subjective   Low back is tight. I've been afraid to exercise. Last time my back felt tight like this, my back went out a few days later.    Objective   See Exercise, Manual, and Modality Logs for complete treatment.       Assessment/Plan  No c/o pain w/ flexion exercises. Encouraged pt to continue HEP to decrease tightness and improve stability. Progress per POC.      Timed:         Manual Therapy:    0     mins  56374;     Therapeutic Exercise:    45     mins  89272;     Neuromuscular Amrik:    0    mins  74580;    Therapeutic Activity:     0     mins  32123;     Gait Trainin     mins  26215;     Ultrasound:     0     mins  23380;    Ionto                               0    mins   98849  Self Care                       0     mins   74251      Un-Timed:  Electrical Stimulation:    0     mins  77479 ( );  Dry Needling     0     mins self-pay  Traction     0     mins 62742  Canalith Repos    0     mins 04301    Timed Treatment:   45   mins   Total Treatment:     45   mins    Heather Hernadez PT  [place] License: [id number]

## 2025-02-13 DIAGNOSIS — F32.A DEPRESSION, UNSPECIFIED DEPRESSION TYPE: ICD-10-CM

## 2025-02-13 DIAGNOSIS — K90.9 DIARRHEA DUE TO MALABSORPTION: ICD-10-CM

## 2025-02-13 DIAGNOSIS — R19.7 DIARRHEA DUE TO MALABSORPTION: ICD-10-CM

## 2025-02-13 RX ORDER — COLESTIPOL HYDROCHLORIDE 1 G/1
TABLET ORAL
Qty: 60 TABLET | Refills: 3 | Status: SHIPPED | OUTPATIENT
Start: 2025-02-13

## 2025-02-14 RX ORDER — ESCITALOPRAM OXALATE 10 MG/1
10 TABLET ORAL DAILY
Qty: 90 TABLET | Refills: 0 | Status: SHIPPED | OUTPATIENT
Start: 2025-02-14

## 2025-02-14 NOTE — TELEPHONE ENCOUNTER
Rx Refill Note  Requested Prescriptions     Pending Prescriptions Disp Refills    escitalopram (LEXAPRO) 10 MG tablet [Pharmacy Med Name: Escitalopram Oxalate 10 MG Oral Tablet] 90 tablet 0     Sig: Take 1 tablet by mouth once daily      Last office visit with prescribing clinician: 11/26/2024   Last telemedicine visit with prescribing clinician: Visit date not found   Next office visit with prescribing clinician: 5/27/2025                         Would you like a call back once the refill request has been completed: [] Yes [] No    If the office needs to give you a call back, can they leave a voicemail: [] Yes [] No    Cordelia Rivers MA  02/14/25, 10:43 EST

## 2025-02-17 ENCOUNTER — TELEPHONE (OUTPATIENT)
Dept: PHYSICAL THERAPY | Facility: OTHER | Age: 50
End: 2025-02-17
Payer: COMMERCIAL

## 2025-02-17 NOTE — TELEPHONE ENCOUNTER
"  Caller: Austin Orta \"KASHMIR\"    Relationship: Self    What was the call regarding: PATIENT CANCELLED APPT TODAY DUE TO NOT FEELING WELL      "

## 2025-02-24 ENCOUNTER — TELEPHONE (OUTPATIENT)
Dept: PHYSICAL THERAPY | Facility: OTHER | Age: 50
End: 2025-02-24
Payer: COMMERCIAL

## 2025-03-12 ENCOUNTER — OFFICE VISIT (OUTPATIENT)
Dept: NEUROLOGY | Facility: CLINIC | Age: 50
End: 2025-03-12
Payer: COMMERCIAL

## 2025-03-12 VITALS
BODY MASS INDEX: 34.13 KG/M2 | SYSTOLIC BLOOD PRESSURE: 124 MMHG | WEIGHT: 252 LBS | HEIGHT: 72 IN | OXYGEN SATURATION: 95 % | DIASTOLIC BLOOD PRESSURE: 76 MMHG

## 2025-03-12 DIAGNOSIS — G40.909 NONINTRACTABLE EPILEPSY WITHOUT STATUS EPILEPTICUS, UNSPECIFIED EPILEPSY TYPE: ICD-10-CM

## 2025-03-12 PROCEDURE — 99214 OFFICE O/P EST MOD 30 MIN: CPT | Performed by: STUDENT IN AN ORGANIZED HEALTH CARE EDUCATION/TRAINING PROGRAM

## 2025-03-12 NOTE — PROGRESS NOTES
Chief Complaint   Patient presents with    Seizures       Patient ID: Austin Orta is a 50 y.o. male.    HPI:    The following portions of the patient's history were reviewed and updated as appropriate: allergies, current medications, past family history, past medical history, past social history, past surgical history and problem list.       History of Present Illness  Interval history.  The patient presents to the neurology clinic for a follow-up of epilepsy.    He was previously on Keppra 500 mg in the morning and 1000 mg at night but reported being quick to anger on that medication, so a switch to Briviact was made. He has successfully transitioned from Keppra to Briviact without any complications. He reports a significant improvement in his mood, with a notable decrease in episodes of explosive anger. He thinks he may not have had any since switching. He expresses a desire to maintain his current dosage of Briviact, which is currently covered well by his insurance.    Supplemental Information  He has a history of kidney stones.    MEDICATIONS  Current: brivaracetam  Discontinued: levetiracetam      Review of Systems   Neurological:  Negative for dizziness, tremors, seizures, syncope, facial asymmetry, speech difficulty, weakness, light-headedness, numbness and headaches.         There were no vitals filed for this visit.    Neurological Exam    Physical Exam    Procedures    Assessment/Plan:    Assessment & Plan  1. Epilepsy.  He has been seizure-free for the past 3 to 6 months, indicating a positive response to the current treatment regimen. He transitioned from Keppra to Briviact and reports fewer mood side effects, specifically less explosive anger. His liver and kidney function tests conducted in December 2024 were within normal limits. He will continue taking Briviact as prescribed, one pill in the morning and two pills at night. A prescription for a 90-day supply with several refills will be sent to  [facility] [address]. If the medication becomes unaffordable, he is advised to inform the clinic immediately so that alternative options like lacosamide or lamotrigine can be considered. No additional lab work is required at this time. He has been counseled on seizure precautions, including avoiding open flames, bodies of water, heights greater than his own, and heavy machinery. He is also aware of the [place] state law prohibiting driving for three months post-seizure, although he is currently beyond this period.    -The patient has epilepsy which is a chronic condition that poses a threat to life or serious harm  -Prescription medications are managed in this visit      Follow-up  The patient will follow up in 11 months.     Patient or patient representative verbalized consent for the use of Ambient Listening during the visit with  Lalo Delacruz MD for chart documentation. 3/12/2025  11:03 EDT     There are no diagnoses linked to this encounter.       Leanne Lynne MA

## 2025-03-19 ENCOUNTER — TELEPHONE (OUTPATIENT)
Dept: NEUROLOGY | Facility: CLINIC | Age: 50
End: 2025-03-19
Payer: COMMERCIAL

## 2025-03-19 NOTE — TELEPHONE ENCOUNTER
"Provider: DR DE PAZ    Caller: Austin Orta \"KASHMIR\"    Relationship to Patient: Self    Phone Number: [phone]    Reason for Call: PATIENT IS NEEDING PRIOR AUTH FOR BRIVIACT. STATES HE ONLY HAS A COUPLE DAYS LEFT NOW & IS LEAVING FOR [place] MONDAY. PLEASE REVIEW & ADVISE, THANK YOU.  " impairments found/rehab potential/therapy frequency/anticipated equipment needs at discharge/anticipated discharge recommendation

## 2025-03-19 NOTE — TELEPHONE ENCOUNTER
I did PA in January it was approved until 1/5/2026. I sent patient a My Chart with a copy of approval

## 2025-03-20 ENCOUNTER — TELEPHONE (OUTPATIENT)
Dept: NEUROLOGY | Facility: CLINIC | Age: 50
End: 2025-03-20
Payer: COMMERCIAL

## 2025-03-20 RX ORDER — MELOXICAM 15 MG/1
TABLET ORAL
Qty: 90 TABLET | Refills: 1 | Status: SHIPPED | OUTPATIENT
Start: 2025-03-20

## 2025-03-20 NOTE — TELEPHONE ENCOUNTER
I have approval 3 times to [facility]. I called today and left a voice message with approval information. I spoke with patient letting him know.

## 2025-03-20 NOTE — TELEPHONE ENCOUNTER
"Provider: BALDEV    Caller: Austin Orta \"KASHMIR\"    Relationship to Patient: Self    Pharm[facility]: [facility]     Phone Number: [phone]    Reason for Call: PT IS COMPLETELY OUT OF BRIVIACT AND PHARMACY STATES THEY NEED A PA.  HE IS GOING TO TRY TO SEND THE APPROVAL LETTER TO [facility] TO SEE IF THAT WILL WORK. HE JUST SPOKE WITH [facility] PHARMACY ABOUT HALF AN HOUR AGO AND WAS TOLD HE NEEDS A PA.  HE WILL BE GOING OUT OF TOWN MONDAY.      PLEASE CALL PT TO ADVISE     THANK YOU     "

## 2025-03-20 NOTE — TELEPHONE ENCOUNTER
Rx Refill Note  Requested Prescriptions     Pending Prescriptions Disp Refills    meloxicam (MOBIC) 15 MG tablet [Pharmacy Med Name: Meloxicam 15 MG Oral Tablet] 90 tablet 0     Sig: TAKE 1 TABLET BY MOUTH ONCE DAILY AS NEEDED WITH  FOOD  AND  WATER      Last office visit with prescribing clinician: 11/26/2024   Last telemedicine visit with prescribing clinician: Visit date not found   Next office visit with prescribing clinician: 5/27/2025                         Would you like a call back once the refill request has been completed: [] Yes [] No    If the office needs to give you a call back, can they leave a voicemail: [] Yes [] No    Cordelia Rivers MA  03/20/25, 09:42 EDT

## 2025-04-08 RX ORDER — BISOPROLOL FUMARATE 5 MG/1
5 TABLET, FILM COATED ORAL DAILY
Qty: 30 TABLET | Refills: 8 | Status: SHIPPED | OUTPATIENT
Start: 2025-04-08

## 2025-05-03 DIAGNOSIS — F32.A DEPRESSION, UNSPECIFIED DEPRESSION TYPE: ICD-10-CM

## 2025-05-05 RX ORDER — ESCITALOPRAM OXALATE 10 MG/1
10 TABLET ORAL DAILY
Qty: 90 TABLET | Refills: 0 | Status: SHIPPED | OUTPATIENT
Start: 2025-05-05

## 2025-05-05 NOTE — TELEPHONE ENCOUNTER
Rx Refill Note  Requested Prescriptions     Pending Prescriptions Disp Refills    escitalopram (LEXAPRO) 10 MG tablet [Pharmacy Med Name: Escitalopram Oxalate 10 MG Oral Tablet] 90 tablet 0     Sig: Take 1 tablet by mouth once daily      Last office visit with prescribing clinician: 11/26/2024   Last telemedicine visit with prescribing clinician: Visit date not found   Next office visit with prescribing clinician: 5/27/2025                         Would you like a call back once the refill request has been completed: [] Yes [] No    If the office needs to give you a call back, can they leave a voicemail: [] Yes [] No    Cordelia Rivers MA  05/05/25, 07:42 EDT

## 2025-05-31 DIAGNOSIS — R19.7 DIARRHEA DUE TO MALABSORPTION: ICD-10-CM

## 2025-05-31 DIAGNOSIS — K90.9 DIARRHEA DUE TO MALABSORPTION: ICD-10-CM

## 2025-06-02 RX ORDER — COLESTIPOL HYDROCHLORIDE 1 G/1
1 TABLET ORAL
Qty: 60 TABLET | Refills: 0 | Status: SHIPPED | OUTPATIENT
Start: 2025-06-02

## 2025-06-04 ENCOUNTER — OFFICE VISIT (OUTPATIENT)
Dept: FAMILY MEDICINE CLINIC | Facility: CLINIC | Age: 50
End: 2025-06-04
Payer: COMMERCIAL

## 2025-06-04 VITALS
SYSTOLIC BLOOD PRESSURE: 126 MMHG | WEIGHT: 259.1 LBS | HEIGHT: 72 IN | OXYGEN SATURATION: 98 % | HEART RATE: 76 BPM | BODY MASS INDEX: 35.1 KG/M2 | DIASTOLIC BLOOD PRESSURE: 80 MMHG

## 2025-06-04 DIAGNOSIS — F41.9 ANXIETY: ICD-10-CM

## 2025-06-04 DIAGNOSIS — E78.2 MIXED HYPERLIPIDEMIA: ICD-10-CM

## 2025-06-04 DIAGNOSIS — F32.A DEPRESSION, UNSPECIFIED DEPRESSION TYPE: ICD-10-CM

## 2025-06-04 DIAGNOSIS — Q28.3 CAVERNOUS MALFORMATION: ICD-10-CM

## 2025-06-04 DIAGNOSIS — I10 PRIMARY HYPERTENSION: Primary | ICD-10-CM

## 2025-06-04 DIAGNOSIS — R56.9 SEIZURE: ICD-10-CM

## 2025-06-04 DIAGNOSIS — K21.00 GASTROESOPHAGEAL REFLUX DISEASE WITH ESOPHAGITIS WITHOUT HEMORRHAGE: ICD-10-CM

## 2025-06-04 PROCEDURE — 99213 OFFICE O/P EST LOW 20 MIN: CPT | Performed by: NURSE PRACTITIONER

## 2025-06-04 RX ORDER — TAMSULOSIN HYDROCHLORIDE 0.4 MG/1
CAPSULE ORAL
COMMUNITY
Start: 2025-03-13

## 2025-06-04 RX ORDER — ESCITALOPRAM OXALATE 10 MG/1
10 TABLET ORAL DAILY
Qty: 90 TABLET | Refills: 3 | Status: SHIPPED | OUTPATIENT
Start: 2025-06-04

## 2025-06-04 RX ORDER — OMEPRAZOLE 40 MG/1
40 CAPSULE, DELAYED RELEASE ORAL DAILY
Qty: 90 CAPSULE | Refills: 3 | Status: SHIPPED | OUTPATIENT
Start: 2025-06-04

## 2025-06-04 NOTE — PATIENT INSTRUCTIONS
Discharge instructions    Continue present care you are doing spectacular you are on top of everything you are doing well  Clarify with your neurologist whether or not you need  You need additional brain scanning visit your condition require follow-up monitoring  So get a MRI of the brain,    Otherwise looks like you are doing great, continue present medications keep meloxicam lowest effective dose shortest time possible always with tall glass of water extra daily    After you control your pain consider changing it to every other day      Follow-up annual physical,    Read about Zepbound  Wegovy  See if your insurance covers these for obesity  If so and affordable follow-up here if you are interested these are wonderful medications and Lifesaver    Read about these nonetheless  Read about gastric sleeve    Or potential other option

## 2025-06-04 NOTE — PROGRESS NOTES
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, he said no headaches or dizziness or vision changes anything to suspect any change  He is up-to-date with his doctor, but he has not had a scan,  Having some tennis elbow some aches and pains and recently resume meloxicam but generally tries to avoid it to protect his kidneys and liver and always drinks lots of water if he is taking,  Up-to-date colonoscopy      Objective   Vital Signs:  /80   Pulse 76   Ht 182.9 cm (72.01\")   Wt 118 kg (259 lb 1.6 oz)   SpO2 98%   BMI 35.13 kg/m²   Estimated body mass index is 35.13 kg/m² as calculated from the following:    Height as of this encounter: 182.9 cm (72.01\").    Weight as of this encounter: 118 kg (259 lb 1.6 oz).            Physical Exam   Result Review :                Assessment and Plan   Diagnoses and all orders for this visit:    1. Primary hypertension (Primary)    2. Mixed hyperlipidemia    3. Cavernous malformation    4. Seizure    5. Anxiety    6. Depression, unspecified depression type  -     escitalopram (LEXAPRO) 10 MG tablet; Take 1 tablet by mouth Daily.  Dispense: 90 tablet; Refill: 3    7. Gastroesophageal reflux disease with esophagitis without hemorrhage  -     omeprazole (priLOSEC) 40 MG capsule; Take 1 capsule by mouth Daily.  Dispense: 90 capsule; Refill: 3             Follow Up   Return for Print discharge instructions/educational handouts, Annual physical.  Patient was given instructions and counseling regarding his " condition or for health maintenance advice. Please see specific information pulled into the AVS if appropriate.     Patient Instructions   Discharge instructions    Continue present care you are doing spectacular you are on top of everything you are doing well  Clarify with your neurologist whether or not you need  You need additional brain scanning visit your condition require follow-up monitoring  So get a MRI of the brain,    Otherwise looks like you are doing great, continue present medications keep meloxicam lowest effective dose shortest time possible always with tall glass of water extra daily    After you control your pain consider changing it to every other day      Follow-up annual physical,    Read about Zepbound  Wegovy  See if your insurance covers these for obesity  If so and affordable follow-up here if you are interested these are wonderful medications and Lifesaver    Read about these nonetheless  Read about gastric sleeve    Or potential other option

## 2025-06-09 DIAGNOSIS — E78.2 MIXED HYPERLIPIDEMIA: ICD-10-CM

## 2025-06-10 ENCOUNTER — LAB (OUTPATIENT)
Dept: LAB | Facility: HOSPITAL | Age: 50
End: 2025-06-10
Payer: COMMERCIAL

## 2025-06-10 LAB
ALBUMIN SERPL-MCNC: 4.2 G/DL (ref 3.5–5.2)
ALBUMIN/GLOB SERPL: 1.6 G/DL
ALP SERPL-CCNC: 86 U/L (ref 39–117)
ALT SERPL W P-5'-P-CCNC: 17 U/L (ref 1–41)
ANION GAP SERPL CALCULATED.3IONS-SCNC: 13.9 MMOL/L (ref 5–15)
AST SERPL-CCNC: 18 U/L (ref 1–40)
BASOPHILS # BLD AUTO: 0.06 10*3/MM3 (ref 0–0.2)
BASOPHILS NFR BLD AUTO: 0.6 % (ref 0–1.5)
BILIRUB SERPL-MCNC: 1 MG/DL (ref 0–1.2)
BUN SERPL-MCNC: 15 MG/DL (ref 6–20)
BUN/CREAT SERPL: 18.8 (ref 7–25)
CALCIUM SPEC-SCNC: 9.3 MG/DL (ref 8.6–10.5)
CHLORIDE SERPL-SCNC: 97 MMOL/L (ref 98–107)
CHOLEST SERPL-MCNC: 187 MG/DL (ref 0–200)
CO2 SERPL-SCNC: 26.1 MMOL/L (ref 22–29)
CREAT SERPL-MCNC: 0.8 MG/DL (ref 0.76–1.27)
DEPRECATED RDW RBC AUTO: 38.2 FL (ref 37–54)
EGFRCR SERPLBLD CKD-EPI 2021: 107.1 ML/MIN/1.73
EOSINOPHIL # BLD AUTO: 0.11 10*3/MM3 (ref 0–0.4)
EOSINOPHIL NFR BLD AUTO: 1.2 % (ref 0.3–6.2)
ERYTHROCYTE [DISTWIDTH] IN BLOOD BY AUTOMATED COUNT: 12.1 % (ref 12.3–15.4)
GLOBULIN UR ELPH-MCNC: 2.7 GM/DL
GLUCOSE SERPL-MCNC: 105 MG/DL (ref 65–99)
HBA1C MFR BLD: 6 % (ref 4.8–5.6)
HCT VFR BLD AUTO: 49.8 % (ref 37.5–51)
HDLC SERPL-MCNC: 39 MG/DL (ref 40–60)
HGB BLD-MCNC: 16.5 G/DL (ref 13–17.7)
IMM GRANULOCYTES # BLD AUTO: 0.04 10*3/MM3 (ref 0–0.05)
IMM GRANULOCYTES NFR BLD AUTO: 0.4 % (ref 0–0.5)
LDLC SERPL CALC-MCNC: 122 MG/DL (ref 0–100)
LDLC/HDLC SERPL: 3.05 {RATIO}
LYMPHOCYTES # BLD AUTO: 2.71 10*3/MM3 (ref 0.7–3.1)
LYMPHOCYTES NFR BLD AUTO: 28.6 % (ref 19.6–45.3)
MCH RBC QN AUTO: 28.7 PG (ref 26.6–33)
MCHC RBC AUTO-ENTMCNC: 33.1 G/DL (ref 31.5–35.7)
MCV RBC AUTO: 86.6 FL (ref 79–97)
MONOCYTES # BLD AUTO: 0.88 10*3/MM3 (ref 0.1–0.9)
MONOCYTES NFR BLD AUTO: 9.3 % (ref 5–12)
NEUTROPHILS NFR BLD AUTO: 5.67 10*3/MM3 (ref 1.7–7)
NEUTROPHILS NFR BLD AUTO: 59.9 % (ref 42.7–76)
NRBC BLD AUTO-RTO: 0 /100 WBC (ref 0–0.2)
PLATELET # BLD AUTO: 315 10*3/MM3 (ref 140–450)
PMV BLD AUTO: 9.1 FL (ref 6–12)
POTASSIUM SERPL-SCNC: 4.7 MMOL/L (ref 3.5–5.2)
PROT SERPL-MCNC: 6.9 G/DL (ref 6–8.5)
RBC # BLD AUTO: 5.75 10*6/MM3 (ref 4.14–5.8)
SODIUM SERPL-SCNC: 137 MMOL/L (ref 136–145)
TRIGL SERPL-MCNC: 146 MG/DL (ref 0–150)
TSH SERPL DL<=0.05 MIU/L-ACNC: 2.59 UIU/ML (ref 0.27–4.2)
VLDLC SERPL-MCNC: 26 MG/DL (ref 5–40)
WBC NRBC COR # BLD AUTO: 9.47 10*3/MM3 (ref 3.4–10.8)

## 2025-06-10 PROCEDURE — 83036 HEMOGLOBIN GLYCOSYLATED A1C: CPT | Performed by: NURSE PRACTITIONER

## 2025-06-10 PROCEDURE — 80053 COMPREHEN METABOLIC PANEL: CPT | Performed by: NURSE PRACTITIONER

## 2025-06-10 PROCEDURE — 85025 COMPLETE CBC W/AUTO DIFF WBC: CPT | Performed by: NURSE PRACTITIONER

## 2025-06-10 PROCEDURE — G0103 PSA SCREENING: HCPCS | Performed by: NURSE PRACTITIONER

## 2025-06-10 PROCEDURE — 80061 LIPID PANEL: CPT | Performed by: NURSE PRACTITIONER

## 2025-06-10 PROCEDURE — 84443 ASSAY THYROID STIM HORMONE: CPT | Performed by: NURSE PRACTITIONER

## 2025-06-29 DIAGNOSIS — R19.7 DIARRHEA DUE TO MALABSORPTION: ICD-10-CM

## 2025-06-29 DIAGNOSIS — K90.9 DIARRHEA DUE TO MALABSORPTION: ICD-10-CM

## 2025-06-30 RX ORDER — COLESTIPOL HYDROCHLORIDE 1 G/1
1 TABLET ORAL
Qty: 60 TABLET | Refills: 0 | Status: SHIPPED | OUTPATIENT
Start: 2025-06-30

## 2025-07-25 DIAGNOSIS — K90.9 DIARRHEA DUE TO MALABSORPTION: ICD-10-CM

## 2025-07-25 DIAGNOSIS — R19.7 DIARRHEA DUE TO MALABSORPTION: ICD-10-CM

## 2025-07-25 RX ORDER — COLESTIPOL HYDROCHLORIDE 1 G/1
1 TABLET ORAL
Qty: 60 TABLET | Refills: 0 | Status: SHIPPED | OUTPATIENT
Start: 2025-07-25

## 2025-08-24 DIAGNOSIS — R19.7 DIARRHEA DUE TO MALABSORPTION: ICD-10-CM

## 2025-08-24 DIAGNOSIS — K90.9 DIARRHEA DUE TO MALABSORPTION: ICD-10-CM

## 2025-08-25 RX ORDER — COLESTIPOL HYDROCHLORIDE 1 G/1
1 TABLET ORAL
Qty: 60 TABLET | Refills: 0 | Status: SHIPPED | OUTPATIENT
Start: 2025-08-25

## 2025-08-28 ENCOUNTER — OFFICE VISIT (OUTPATIENT)
Dept: GASTROENTEROLOGY | Facility: CLINIC | Age: 50
End: 2025-08-28
Payer: COMMERCIAL

## 2025-08-28 VITALS
HEIGHT: 72 IN | TEMPERATURE: 98 F | WEIGHT: 253.6 LBS | OXYGEN SATURATION: 97 % | SYSTOLIC BLOOD PRESSURE: 112 MMHG | DIASTOLIC BLOOD PRESSURE: 68 MMHG | BODY MASS INDEX: 34.35 KG/M2 | HEART RATE: 67 BPM

## 2025-08-28 DIAGNOSIS — K91.89 POSTCHOLECYSTECTOMY DIARRHEA: ICD-10-CM

## 2025-08-28 DIAGNOSIS — K21.00 GASTROESOPHAGEAL REFLUX DISEASE WITH ESOPHAGITIS WITHOUT HEMORRHAGE: Primary | ICD-10-CM

## 2025-08-28 DIAGNOSIS — R19.7 POSTCHOLECYSTECTOMY DIARRHEA: ICD-10-CM

## 2025-08-28 PROCEDURE — 99214 OFFICE O/P EST MOD 30 MIN: CPT | Performed by: NURSE PRACTITIONER

## (undated) DEVICE — GLV SURG BIOGEL LTX PF 8

## (undated) DEVICE — SOL H2O INJ PL/BG 1000ML STRL

## (undated) DEVICE — LOU CYSTO: Brand: MEDLINE INDUSTRIES, INC.